# Patient Record
Sex: FEMALE | Race: WHITE | Employment: OTHER | URBAN - METROPOLITAN AREA
[De-identification: names, ages, dates, MRNs, and addresses within clinical notes are randomized per-mention and may not be internally consistent; named-entity substitution may affect disease eponyms.]

---

## 2017-01-11 ENCOUNTER — HOSPITAL ENCOUNTER (OUTPATIENT)
Dept: LAB | Age: 72
Discharge: HOME | End: 2017-01-11
Payer: MEDICARE

## 2017-01-11 ENCOUNTER — OFFICE VISIT (OUTPATIENT)
Dept: FAMILY MEDICINE CLINIC | Age: 72
End: 2017-01-11

## 2017-01-11 VITALS
HEIGHT: 62 IN | DIASTOLIC BLOOD PRESSURE: 75 MMHG | RESPIRATION RATE: 16 BRPM | BODY MASS INDEX: 37.36 KG/M2 | SYSTOLIC BLOOD PRESSURE: 131 MMHG | HEART RATE: 58 BPM | WEIGHT: 203 LBS | OXYGEN SATURATION: 95 % | TEMPERATURE: 97.8 F

## 2017-01-11 DIAGNOSIS — E78.5 HYPERLIPIDEMIA, UNSPECIFIED HYPERLIPIDEMIA TYPE: ICD-10-CM

## 2017-01-11 DIAGNOSIS — Z12.11 SCREEN FOR COLON CANCER: ICD-10-CM

## 2017-01-11 DIAGNOSIS — I10 ESSENTIAL HYPERTENSION: Primary | ICD-10-CM

## 2017-01-11 DIAGNOSIS — Z13.39 SCREENING FOR ALCOHOLISM: ICD-10-CM

## 2017-01-11 DIAGNOSIS — Z13.31 SCREENING FOR DEPRESSION: ICD-10-CM

## 2017-01-11 DIAGNOSIS — Z11.59 NEED FOR HEPATITIS C SCREENING TEST: ICD-10-CM

## 2017-01-11 DIAGNOSIS — R73.03 PRE-DIABETES: ICD-10-CM

## 2017-01-11 DIAGNOSIS — Z00.00 ROUTINE GENERAL MEDICAL EXAMINATION AT A HEALTH CARE FACILITY: ICD-10-CM

## 2017-01-11 PROCEDURE — 80053 COMPREHEN METABOLIC PANEL: CPT

## 2017-01-11 PROCEDURE — 80061 LIPID PANEL: CPT

## 2017-01-11 PROCEDURE — 36415 COLL VENOUS BLD VENIPUNCTURE: CPT

## 2017-01-11 PROCEDURE — 86803 HEPATITIS C AB TEST: CPT

## 2017-01-11 RX ORDER — METOPROLOL SUCCINATE 50 MG/1
TABLET, EXTENDED RELEASE ORAL DAILY
COMMUNITY
End: 2017-02-06 | Stop reason: SDUPTHER

## 2017-01-11 RX ORDER — DICLOFENAC SODIUM 10 MG/G
2 GEL TOPICAL
COMMUNITY
Start: 2016-10-26 | End: 2021-06-23 | Stop reason: ALTCHOICE

## 2017-01-11 RX ORDER — NABUMETONE 750 MG/1
750 TABLET, FILM COATED ORAL 2 TIMES DAILY
COMMUNITY
End: 2017-02-06 | Stop reason: SDUPTHER

## 2017-01-11 RX ORDER — GABAPENTIN 300 MG/1
300 CAPSULE ORAL 3 TIMES DAILY
COMMUNITY
End: 2017-02-06 | Stop reason: SDUPTHER

## 2017-01-11 RX ORDER — HYDROCODONE BITARTRATE AND ACETAMINOPHEN 7.5; 325 MG/1; MG/1
TABLET ORAL
COMMUNITY
End: 2017-03-20 | Stop reason: SDUPTHER

## 2017-01-11 RX ORDER — VENLAFAXINE HYDROCHLORIDE 75 MG/1
CAPSULE, EXTENDED RELEASE ORAL
COMMUNITY
Start: 2016-10-20 | End: 2017-03-20 | Stop reason: ALTCHOICE

## 2017-01-11 NOTE — PROGRESS NOTES
This is a Subsequent Medicare Annual Wellness Visit providing Personalized Prevention Plan Services (PPPS) (Performed 12 months after initial AWV and PPPS )    I have reviewed the patient's medical history in detail and updated the computerized patient record. Immunizations:    Per pt report, up to date  Flu, TDAP, Zoster at [address] @ [facility]    Screening:  Up to date on Pap (2015), Mammogram (2015), Dexa (2015)  Declines colonoscopy; will do FIT testing    Eye Doctor: Dr. Raquel Child in [facility]. Had eye exam in the last year. Diet/Exercise: Patient unable to exercise regularly due to knee and back pain        History     Past Medical History   Diagnosis Date    Anemia     Arthritis     Chronic pain     Depression     Hypercholesterolemia     Hypertension       Past Surgical History   Procedure Laterality Date    Hx hip replacement Bilateral     Hx knee replacement Bilateral     Hx carpal tunnel release Bilateral     Hx appendectomy      Hx hysteroscopy       Current Outpatient Prescriptions   Medication Sig Dispense Refill    VOLTAREN 1 % gel       venlafaxine-SR (EFFEXOR-XR) 75 mg capsule       metoprolol succinate (TOPROL-XL) 50 mg XL tablet Take  by mouth daily.  HYDROcodone-acetaminophen (NORCO) 7.5-325 mg per tablet Take  by mouth.  gabapentin (NEURONTIN) 300 mg capsule Take 300 mg by mouth three (3) times daily.  nabumetone (RELAFEN) 750 mg tablet Take 750 mg by mouth two (2) times a day. Allergies   Allergen Reactions    Sulfa (Sulfonamide Antibiotics) Hives    Zoloft [Sertraline] Hives     History reviewed. No pertinent family history. Social History   Substance Use Topics    Smoking status: Never Smoker    Smokeless tobacco: Never Used    Alcohol use No     There is no problem list on file for this patient.       Depression Risk Factor Screening:     PHQ 2 / 9, over the last two weeks 1/11/2017   Haley interest or pleasure in doing things Several days   Feeling down, depressed or hopeless Several days   Total Score PHQ 2 2     Alcohol Risk Factor Screening: On any occasion during the past 3 months, have you had more than 3 drinks containing alcohol? No    Do you average more than 7 drinks per week? No      Functional Ability and Level of Safety:     Hearing Loss   none    Activities of Daily Living   Self-care. Requires assistance with: no ADLs    Fall Risk     Fall Risk Assessment, last 12 mths 1/11/2017   Able to walk? Yes   Fall in past 12 months? No     Abuse Screen   Patient is not abused    Review of Systems   ROS  Gen: denies fever, chills, fatigue, weight loss, weight gain  HEENT:denies blurry vision, nasal congestion, sore throat  Resp: denies dypsnea, cough, wheezing  CV: denies chest pain, palpitations, lower extremity edema  Abd: denies nausea, vomiting, diarrhea, constipation  Neuro: denies numbness/tingling  Endo: denies polyuria, polydipsia, heat/cold intolerance  Heme: no lymphadenopathy      Physical Examination     Evaluation of Cognitive Function:  Mood/affect:  neutral  Appearance: age appropriate  Family member/caregiver input: not present    Gen: alert, oriented, no acute distress  Head: NCAT  Eyes: PERRLA, sclera clear, conjunctiva clear  Oral: moist mucus membranes,  Neck: supple, midline trachea, no retractions. Thyroid WNL. Resp: Lungs CTAB, no wheezing, rales or rhonchi  CV: Normal rhythm, normal S1/S2, no m/r/g. No LE edema  MSK: normal strength and movement in all extremities, reflexes age appropriate  Skin: no lesion or rash      Patient Care Team:  Sarahi Cuba MD as PCP - General (Family Practice)    Advice/Referrals/Counseling   Education and counseling provided:  Colorectal cancer screening tests  hepatitis C screening    Assessment/Plan   Noel Denise was seen today for annual wellness visit.     Diagnoses and all orders for this visit:    Essential hypertension  - METABOLIC PANEL, COMPREHENSIVE    Routine general medical examination at a health care facility    Screening for alcoholism    Screening for depression  -     Depression Screen Annual    Screen for colon cancer  -     OCCULT BLOOD, IMMUNOASSAY (FIT) (87676)    Need for hepatitis C screening test  -     HEPATITIS C AB    Hyperlipidemia, unspecified hyperlipidemia type  -     LIPID PANEL  -     METABOLIC PANEL, COMPREHENSIVE    Pre-diabetes  -check glucose with CMP; pt is fasting. If elevated, will check A1c. Records reviewed, will scan to chart. Health Maintenance reviewed - will get records. Verbal and written instructions (see AVS) provided.  Patient expresses understanding of diagnosis and treatment plan.     Alvarado Trejo MD

## 2017-01-11 NOTE — MR AVS SNAPSHOT
Visit Information Date & Time Provider Department Dept. Phone Encounter #  
 1/11/2017  1:00 PM Saloni Stein MD [address] [facility] [phone] [id number] Upcoming Health Maintenance Date Due Hepatitis C Screening 1945 DTaP/Tdap/Td series (1 - Tdap) 11/11/1968 BREAST CANCER SCRN MAMMOGRAM 11/11/1997 FOBT Q 1 YEAR AGE 50-75 11/11/1997 ZOSTER VACCINE AGE 60> 11/11/2007 GLAUCOMA SCREENING Q2Y 11/11/2012 OSTEOPOROSIS SCREENING (DEXA) 11/11/2012 MEDICARE YEARLY EXAM 11/11/2012 Pneumococcal 65+ Low/Medium Risk (2 of 2 - PPSV23) 9/14/2016 Allergies as of 1/11/2017  Review Complete On: 1/11/2017 By: Saloni Stein MD  
  
 Severity Noted Reaction Type Reactions Sulfa (Sulfonamide Antibiotics)  01/11/2017    Hives Zoloft [Sertraline]  01/11/2017    Hives Current Immunizations  Never Reviewed No immunizations on file. Not reviewed this visit You Were Diagnosed With   
  
 Codes Comments Essential hypertension    -  Primary ICD-10-CM: I10 
ICD-9-CM: 401.9 Routine general medical examination at a health care facility     ICD-10-CM: Z00.00 ICD-9-CM: V70.0 Screening for alcoholism     ICD-10-CM: Z13.89 ICD-9-CM: V79.1 Screening for depression     ICD-10-CM: Z13.89 ICD-9-CM: V79.0 Screen for colon cancer     ICD-10-CM: Z12.11 ICD-9-CM: V76.51 Need for hepatitis C screening test     ICD-10-CM: Z11.59 
ICD-9-CM: V73.89 Hyperlipidemia, unspecified hyperlipidemia type     ICD-10-CM: E78.5 ICD-9-CM: 272.4 Pre-diabetes     ICD-10-CM: R73.03 
ICD-9-CM: 790.29 Vitals BP Pulse Temp Resp Height(growth percentile) Weight(growth percentile) 131/75 (BP 1 Location: Left arm, BP Patient Position: Sitting) (!) 58 97.8 °F (36.6 °C) (Oral) 16 5' 2\" (1.575 m) 203 lb (92.1 kg) SpO2 BMI OB Status Smoking Status 95% 37.13 kg/m2 Postmenopausal Never Smoker BMI and BSA Data Body Mass Index Body Surface Area  
 37.13 kg/m 2 2.01 m 2 Your Updated Medication List  
  
   
This list is accurate as of: 1/11/17  1:52 PM.  Always use your most recent med list.  
  
  
  
  
 gabapentin 300 mg capsule Commonly known as:  NEURONTIN Take 300 mg by mouth three (3) times daily. HYDROcodone-acetaminophen 7.5-325 mg per tablet Commonly known as:  Lucille Lynn Take  by mouth.  
  
 metoprolol succinate 50 mg XL tablet Commonly known as:  TOPROL-XL Take  by mouth daily. nabumetone 750 mg tablet Commonly known as:  RELAFEN Take 750 mg by mouth two (2) times a day. venlafaxine-SR 75 mg capsule Commonly known as:  EFFEXOR-XR  
  
 VOLTAREN 1 % Gel Generic drug:  diclofenac We Performed the Following [facility] 70 [[id number] HCPCS] HEPATITIS C AB [65956 CPT(R)] LIPID PANEL [05399 CPT(R)] METABOLIC PANEL, COMPREHENSIVE [61610 CPT(R)] OCCULT BLOOD, IMMUNOASSAY (FIT) [id number] CPT(R)] Patient Instructions Wellbutrin Well Visit, Over 72: Care Instructions Your Care Instructions Physical exams can help you stay healthy. Your doctor has checked your overall health and may have suggested ways to take good care of yourself. He or she also may have recommended tests. At home, you can help prevent illness with healthy eating, regular exercise, and other steps. Follow-up care is a key part of your treatment and safety. Be sure to make and go to all appointments, and call your doctor if you are having problems. It's also a good idea to know your test results and keep a list of the medicines you take. How can you care for yourself at home? · Reach and stay at a healthy weight. This will lower your risk for many problems, such as obesity, diabetes, heart disease, and high blood pressure. · Get at least 30 minutes of exercise on most days of the week.  Walking is a good choice. You also may want to do other activities, such as running, swimming, cycling, or playing tennis or team sports. · Do not smoke. Smoking can make health problems worse. If you need help quitting, talk to your doctor about stop-smoking programs and medicines. These can increase your chances of quitting for good. · Protect your skin from too much sun. When you're outdoors from 10 a.m. to 4 p.m., stay in the shade or cover up with clothing and a hat with a wide brim. Wear sunglasses that block UV rays. Even when it's cloudy, put broad-spectrum sunscreen (SPF 30 or higher) on any exposed skin. · See a dentist one or two times a year for checkups and to have your teeth cleaned. · Wear a seat belt in the car. · Limit alcohol to 2 drinks a day for men and 1 drink a day for women. Too much alcohol can cause health problems. Follow your doctor's advice about when to have certain tests. These tests can spot problems early. For men and women · Cholesterol. Your doctor will tell you how often to have this done based on your overall health and other things that can increase your risk for heart attack and stroke. · Blood pressure. Have your blood pressure checked during a routine doctor visit. Your doctor will tell you how often to check your blood pressure based on your age, your blood pressure results, and other factors. · Diabetes. Ask your doctor whether you should have tests for diabetes. · Vision. Experts recommend that you have yearly exams for glaucoma and other age-related eye problems. · Hearing. Tell your doctor if you notice any change in your hearing. You can have tests to find out how well you hear. · Colon cancer tests. Keep having colon cancer tests as your doctor recommends. You can have one of several types of tests. · Heart attack and stroke risk. At least every 4 to 6 years, you should have your risk for heart attack and stroke assessed.  Your doctor uses factors such as your age, blood pressure, cholesterol, and whether you smoke or have diabetes to show what your risk for a heart attack or stroke is over the next 10 years. · Osteoporosis. Talk to your doctor about whether you should have a bone density test to find out whether you have thinning bones. Also ask your doctor about whether you should take calcium and vitamin D supplements. For women · Pap test and pelvic exam. You may no longer need a Pap test. Talk with your doctor about whether to stop or continue to have Pap tests. · Breast exam and mammogram. Ask how often you should have a mammogram, which is an X-ray of your breasts. A mammogram can spot breast cancer before it can be felt and when it is easiest to treat. · Thyroid disease. Talk to your doctor about whether to have your thyroid checked as part of a regular physical exam. Women have an increased chance of a thyroid problem. For men · Prostate exam. Talk to your doctor about whether you should have a blood test (called a PSA test) for prostate cancer. Experts disagree on whether men should have this test. Some experts recommend that you discuss the benefits and risks of the test with your doctor. · Abdominal aortic aneurysm. Ask your doctor whether you should have a test to check for an aneurysm. You may need a test if you ever smoked or if your parent, brother, sister, or child has had an aneurysm. When should you call for help? Watch closely for changes in your health, and be sure to contact your doctor if you have any problems or symptoms that concern you. Where can you learn more? Go to http://breann-johnnie.info/. Enter J771 in the search box to learn more about \"Well Visit, Over 65: Care Instructions. \" Current as of: July 19, 2016 Content Version: 11.1 © 0219-8712 [employer].  Care instructions adapted under license by [employer] (which disclaims liability or warranty for this information). If you have questions about a medical condition or this instruction, always ask your healthcare professional. [address] any warranty or liability for your use of this information. Introducing [facility] SERVICES! Elvi العراقي introduces Trendmeon patient portal. Now you can access parts of your medical record, email your doctor's office, and request medication refills online. 1. In your internet browser, go to [email or web address] 2. Click on the First Time User? Click Here link in the Sign In box. You will see the New Member Sign Up page. 3. Enter your Trendmeon Access Code exactly as it appears below. You will not need to use this code after youve completed the sign-up process. If you do not sign up before the expiration date, you must request a new code. · Trendmeon Access Code: [email or web address] Expires: 4/11/2017  1:15 PM 
 
4. Enter the last four digits of your Social Security Number (xxxx) and Date of Birth (mm/dd/yyyy) as indicated and click Submit. You will be taken to the next sign-up page. 5. Create a Trendmeon ID. This will be your Trendmeon login ID and cannot be changed, so think of one that is secure and easy to remember. 6. Create a Trendmeon password. You can change your password at any time. 7. Enter your Password Reset Question and Answer. This can be used at a later time if you forget your password. 8. Enter your e-mail address. You will receive e-mail notification when new information is available in [address] 9. Click Sign Up. You can now view and download portions of your medical record. 10. Click the Download Summary menu link to download a portable copy of your medical information. If you have questions, please visit the Frequently Asked Questions section of the Trendmeon website. Remember, Trendmeon is NOT to be used for urgent needs. For medical emergencies, dial 911. Now available from your iPhone and Android! Please provide this summary of care documentation to your next provider. Your primary care clinician is listed as Audrey Cordon. If you have any questions after today's visit, please call [phone].

## 2017-01-11 NOTE — PATIENT INSTRUCTIONS
Wellbutrin     Well Visit, Over 72: Care Instructions  Your Care Instructions  Physical exams can help you stay healthy. Your doctor has checked your overall health and may have suggested ways to take good care of yourself. He or she also may have recommended tests. At home, you can help prevent illness with healthy eating, regular exercise, and other steps. Follow-up care is a key part of your treatment and safety. Be sure to make and go to all appointments, and call your doctor if you are having problems. It's also a good idea to know your test results and keep a list of the medicines you take. How can you care for yourself at home? · Reach and stay at a healthy weight. This will lower your risk for many problems, such as obesity, diabetes, heart disease, and high blood pressure. · Get at least 30 minutes of exercise on most days of the week. Walking is a good choice. You also may want to do other activities, such as running, swimming, cycling, or playing tennis or team sports. · Do not smoke. Smoking can make health problems worse. If you need help quitting, talk to your doctor about stop-smoking programs and medicines. These can increase your chances of quitting for good. · Protect your skin from too much sun. When you're outdoors from 10 a.m. to 4 p.m., stay in the shade or cover up with clothing and a hat with a wide brim. Wear sunglasses that block UV rays. Even when it's cloudy, put broad-spectrum sunscreen (SPF 30 or higher) on any exposed skin. · See a dentist one or two times a year for checkups and to have your teeth cleaned. · Wear a seat belt in the car. · Limit alcohol to 2 drinks a day for men and 1 drink a day for women. Too much alcohol can cause health problems. Follow your doctor's advice about when to have certain tests. These tests can spot problems early. For men and women  · Cholesterol.  Your doctor will tell you how often to have this done based on your overall health and other things that can increase your risk for heart attack and stroke. · Blood pressure. Have your blood pressure checked during a routine doctor visit. Your doctor will tell you how often to check your blood pressure based on your age, your blood pressure results, and other factors. · Diabetes. Ask your doctor whether you should have tests for diabetes. · Vision. Experts recommend that you have yearly exams for glaucoma and other age-related eye problems. · Hearing. Tell your doctor if you notice any change in your hearing. You can have tests to find out how well you hear. · Colon cancer tests. Keep having colon cancer tests as your doctor recommends. You can have one of several types of tests. · Heart attack and stroke risk. At least every 4 to 6 years, you should have your risk for heart attack and stroke assessed. Your doctor uses factors such as your age, blood pressure, cholesterol, and whether you smoke or have diabetes to show what your risk for a heart attack or stroke is over the next 10 years. · Osteoporosis. Talk to your doctor about whether you should have a bone density test to find out whether you have thinning bones. Also ask your doctor about whether you should take calcium and vitamin D supplements. For women  · Pap test and pelvic exam. You may no longer need a Pap test. Talk with your doctor about whether to stop or continue to have Pap tests. · Breast exam and mammogram. Ask how often you should have a mammogram, which is an X-ray of your breasts. A mammogram can spot breast cancer before it can be felt and when it is easiest to treat. · Thyroid disease. Talk to your doctor about whether to have your thyroid checked as part of a regular physical exam. Women have an increased chance of a thyroid problem. For men  · Prostate exam. Talk to your doctor about whether you should have a blood test (called a PSA test) for prostate cancer.  Experts disagree on whether men should have this test. Some experts recommend that you discuss the benefits and risks of the test with your doctor. · Abdominal aortic aneurysm. Ask your doctor whether you should have a test to check for an aneurysm. You may need a test if you ever smoked or if your parent, brother, sister, or child has had an aneurysm. When should you call for help? Watch closely for changes in your health, and be sure to contact your doctor if you have any problems or symptoms that concern you. Where can you learn more? Go to http://breann-johnnie.info/. Enter M683 in the search box to learn more about \"Well Visit, Over 65: Care Instructions. \"  Current as of: July 19, 2016  Content Version: 11.1  © 0135-6304 [employer]. Care instructions adapted under license by [employer] (which disclaims liability or warranty for this information). If you have questions about a medical condition or this instruction, always ask your healthcare professional. [address] any warranty or liability for your use of this information.

## 2017-01-11 NOTE — Clinical Note
Can we get immunization records from [facility] INPAT[facility]?   Also, Dr. Issac Nur is eye doctor, can we get latest eye exam.  Thanks

## 2017-01-11 NOTE — PROGRESS NOTES
Chief Complaint   Patient presents with   [facility] Annual Wellness Visit     Patient is here for a medicare wellness visit.

## 2017-01-12 LAB
ALBUMIN SERPL-MCNC: 4.5 G/DL (ref 3.6–4.8)
ALBUMIN/GLOB SERPL: 2.1 {RATIO} (ref 1.1–2.5)
ALP SERPL-CCNC: 89 IU/L (ref 39–117)
ALT SERPL-CCNC: 17 IU/L (ref 0–32)
AST SERPL-CCNC: 18 IU/L (ref 0–40)
BILIRUB SERPL-MCNC: 0.3 MG/DL (ref 0–1.2)
BUN SERPL-MCNC: 19 MG/DL (ref 8–27)
BUN/CREAT SERPL: 28 (ref 11–26)
CALCIUM SERPL-MCNC: 9.1 MG/DL (ref 8.7–10.3)
CHLORIDE SERPL-SCNC: 98 MMOL/L (ref 96–106)
CHOLEST SERPL-MCNC: 245 MG/DL (ref 100–199)
CO2 SERPL-SCNC: 23 MMOL/L (ref 18–29)
CREAT SERPL-MCNC: 0.67 MG/DL (ref 0.57–1)
GLOBULIN SER CALC-MCNC: 2.1 G/DL (ref 1.5–4.5)
GLUCOSE SERPL-MCNC: 102 MG/DL (ref 65–99)
HCV AB S/CO SERPL IA: <0.1 S/CO RATIO (ref 0–0.9)
HDLC SERPL-MCNC: 51 MG/DL
INTERPRETATION, 910389: NORMAL
LDLC SERPL CALC-MCNC: 162 MG/DL (ref 0–99)
POTASSIUM SERPL-SCNC: 4.8 MMOL/L (ref 3.5–5.2)
PROT SERPL-MCNC: 6.6 G/DL (ref 6–8.5)
SODIUM SERPL-SCNC: 141 MMOL/L (ref 134–144)
TRIGL SERPL-MCNC: 159 MG/DL (ref 0–149)
VLDLC SERPL CALC-MCNC: 32 MG/DL (ref 5–40)

## 2017-01-13 NOTE — PROGRESS NOTES
Patient verified her name and . Patient notified of her lab results per Dr. Kimber Milton. Patient verbalized understanding. Appt. Scheduled for 17 with Dr. Kimber Milton.

## 2017-01-13 NOTE — PROGRESS NOTES
Please call pt-cholesterol is high and sugar is borderline. Please schedule office visit to discuss possible medications, check A1c.

## 2017-01-18 ENCOUNTER — HOSPITAL ENCOUNTER (OUTPATIENT)
Dept: LAB | Age: 72
Discharge: HOME | End: 2017-01-18
Payer: MEDICARE

## 2017-01-18 ENCOUNTER — OFFICE VISIT (OUTPATIENT)
Dept: FAMILY MEDICINE CLINIC | Age: 72
End: 2017-01-18

## 2017-01-18 VITALS
DIASTOLIC BLOOD PRESSURE: 82 MMHG | HEART RATE: 69 BPM | BODY MASS INDEX: 37.36 KG/M2 | HEIGHT: 62 IN | WEIGHT: 203 LBS | RESPIRATION RATE: 16 BRPM | OXYGEN SATURATION: 96 % | SYSTOLIC BLOOD PRESSURE: 139 MMHG | TEMPERATURE: 98.4 F

## 2017-01-18 DIAGNOSIS — E78.5 HYPERLIPIDEMIA, UNSPECIFIED HYPERLIPIDEMIA TYPE: ICD-10-CM

## 2017-01-18 DIAGNOSIS — R73.03 PRE-DIABETES: Primary | ICD-10-CM

## 2017-01-18 DIAGNOSIS — R53.83 FATIGUE, UNSPECIFIED TYPE: ICD-10-CM

## 2017-01-18 LAB — HBA1C MFR BLD HPLC: 6.2 %

## 2017-01-18 PROCEDURE — 85025 COMPLETE CBC W/AUTO DIFF WBC: CPT

## 2017-01-18 PROCEDURE — 36415 COLL VENOUS BLD VENIPUNCTURE: CPT

## 2017-01-18 RX ORDER — SIMVASTATIN 20 MG/1
20 TABLET, FILM COATED ORAL
Qty: 90 TAB | Refills: 3 | Status: SHIPPED | OUTPATIENT
Start: 2017-01-18 | End: 2017-02-07 | Stop reason: SDUPTHER

## 2017-01-18 NOTE — PROGRESS NOTES
Chief Complaint   Patient presents with    Abnormal Lab Results     Patient is here to discuss lab results.

## 2017-01-18 NOTE — MR AVS SNAPSHOT
Visit Information Date & Time Provider Department Dept. Phone Encounter #  
 1/18/2017  1:45 PM Cassius Fernandez MD [address] [facility] [phone] [id number] Follow-up Instructions Return in about 6 weeks (around 3/1/2017) for cholesterol check. Upcoming Health Maintenance Date Due FOBT Q 1 YEAR, 18+ 11/11/1965 DTaP/Tdap/Td series (1 - Tdap) 11/11/1968 BREAST CANCER SCRN MAMMOGRAM 11/11/1997 ZOSTER VACCINE AGE 60> 11/11/2007 GLAUCOMA SCREENING Q2Y 11/11/2012 OSTEOPOROSIS SCREENING (DEXA) 11/11/2012 Pneumococcal 65+ Low/Medium Risk (2 of 2 - PPSV23) 9/14/2016 MEDICARE YEARLY EXAM 1/12/2018 COLONOSCOPY 1/18/2027 Allergies as of 1/18/2017  Review Complete On: 1/18/2017 By: Stepan Fuelling Severity Noted Reaction Type Reactions Sulfa (Sulfonamide Antibiotics)  01/11/2017    Hives Zoloft [Sertraline]  01/11/2017    Hives Current Immunizations  Never Reviewed No immunizations on file. Not reviewed this visit You Were Diagnosed With   
  
 Codes Comments Elevated random blood glucose level    -  Primary ICD-10-CM: R73.09 
ICD-9-CM: 790.29 Vitals BP Pulse Temp Resp Height(growth percentile) Weight(growth percentile) 139/82 (BP 1 Location: Left arm, BP Patient Position: Sitting) 69 98.4 °F (36.9 °C) (Oral) 16 5' 2\" (1.575 m) 203 lb (92.1 kg) SpO2 BMI OB Status Smoking Status 96% 37.13 kg/m2 Postmenopausal Never Smoker Vitals History BMI and BSA Data Body Mass Index Body Surface Area  
 37.13 kg/m 2 2.01 m 2 Preferred Pharmacy Pharmacy Name Phone [address] [facility] [phone] Your Updated Medication List  
  
   
This list is accurate as of: 1/18/17  2:29 PM.  Always use your most recent med list.  
  
  
  
  
 gabapentin 300 mg capsule Commonly known as:  NEURONTIN  
 Take 300 mg by mouth three (3) times daily. HYDROcodone-acetaminophen 7.5-325 mg per tablet Commonly known as:  Davidson Minor Take  by mouth.  
  
 metoprolol succinate 50 mg XL tablet Commonly known as:  TOPROL-XL Take  by mouth daily. nabumetone 750 mg tablet Commonly known as:  RELAFEN Take 750 mg by mouth two (2) times a day. venlafaxine-SR 75 mg capsule Commonly known as:  EFFEXOR-XR  
  
 VOLTAREN 1 % Gel Generic drug:  diclofenac We Performed the Following AMB POC HEMOGLOBIN A1C [19015 CPT(R)] Follow-up Instructions Return in about 6 weeks (around 3/1/2017) for cholesterol check. Patient Instructions Simvastatin (By mouth) Simvastatin ([facility]-va-STAT-in) Treats high cholesterol and triglyceride levels. May reduce the risk of heart attack, stroke, and related health conditions. This medicine is a statin. Brand Name(s):Zocor There may be other brand names for this medicine. When This Medicine Should Not Be Used: This medicine is not right for everyone. Do not use it if you had an allergic reaction to simvastatin, you are pregnant or breastfeeding, or you have active liver disease. How to Use This Medicine:  
Tablet · Take your medicine as directed. Your dose may need to be changed several times to find what works best for you. · Take the medicine in the evening, unless your doctor tells you otherwise. · Missed dose: Take a dose as soon as you remember. If it is almost time for your next dose, wait until then and take a regular dose. Do not take extra medicine to make up for a missed dose. · Store the medicine in a closed container at room temperature, away from heat, moisture, and direct light. Drugs and Foods to Avoid: Ask your doctor or pharmacist before using any other medicine, including over-the-counter medicines, vitamins, and herbal products.  
· Do not use this medicine together with boceprevir, clarithromycin, cobicistat, cyclosporine, danazol, erythromycin, gemfibrozil, itraconazole, ketoconazole, nefazodone, posaconazole, telaprevir, telithromycin, voriconazole, or medicines to treat HIV/AIDS. · Some foods and medicines can affect how simvastatin works. Tell your doctor if you are using amiodarone, colchicine, dronedarone, lomitapide, niacin (vitamin B3), ranolazine, a blood thinner (such as warfarin), or blood pressure medicine (such as amlodipine, diltiazem, verapamil). · Do not eat grapefruit or drink grapefruit juice while you are using this medicine. Warnings While Using This Medicine: · It is not safe to take this medicine during pregnancy. It could harm an unborn baby. Tell your doctor right away if you become pregnant. · Tell your doctor if you have kidney disease, diabetes, thyroid disease, or a history of stroke or heart disease. Tell your doctor if you drink alcohol regularly or have a history of liver disease. · This medicine may cause liver problems, or myopathy or rhabdomyolysis (muscle damage). · Tell any doctor or dentist who treats you that you are using this medicine. You may need to stop using this medicine several days before you have surgery or medical tests. · Your doctor will do lab tests at regular visits to check on the effects of this medicine. Keep all appointments. · Keep all medicine out of the reach of children. Never share your medicine with anyone. Possible Side Effects While Using This Medicine:  
Call your doctor right away if you notice any of these side effects: · Allergic reaction: Itching or hives, swelling in your face or hands, swelling or tingling in your mouth or throat, chest tightness, trouble breathing · Blistering, peeling, red skin rash · Fast or uneven heartbeat · Dark urine or pale stools, nausea, vomiting, loss of appetite, stomach pain, yellow skin or eyes · Muscle pain, tenderness, or weakness · Unusual tiredness If you notice other side effects that you think are caused by this medicine, tell your doctor. Call your doctor for medical advice about side effects. You may report side effects to FDA at 9-714-GFB-7078 © 2016 [address] is for End User's use only and may not be sold, redistributed or otherwise used for commercial purposes. The above information is an  only. It is not intended as medical advice for individual conditions or treatments. Talk to your doctor, nurse or pharmacist before following any medical regimen to see if it is safe and effective for you. Introducing [facility]! [facility] introduces Smile patient portal. Now you can access parts of your medical record, email your doctor's office, and request medication refills online. 1. In your internet browser, go to [email or web address] 2. Click on the First Time User? Click Here link in the Sign In box. You will see the New Member Sign Up page. 3. Enter your Smile Access Code exactly as it appears below. You will not need to use this code after youve completed the sign-up process. If you do not sign up before the expiration date, you must request a new code. · Smile Access Code: [email or web address] Expires: 4/11/2017  1:15 PM 
 
4. Enter the last four digits of your Social Security Number (xxxx) and Date of Birth (mm/dd/yyyy) as indicated and click Submit. You will be taken to the next sign-up page. 5. Create a Traditional Medicinalst ID. This will be your Smile login ID and cannot be changed, so think of one that is secure and easy to remember. 6. Create a Smile password. You can change your password at any time. 7. Enter your Password Reset Question and Answer. This can be used at a later time if you forget your password. 8. Enter your e-mail address. You will receive e-mail notification when new information is available in [address]. 9. Click Sign Up. You can now view and download portions of your medical record. 10. Click the Download Summary menu link to download a portable copy of your medical information. If you have questions, please visit the Frequently Asked Questions section of the [employer] website. Remember, [employer] is NOT to be used for urgent needs. For medical emergencies, dial 911. Now available from your iPhone and Android! Please provide this summary of care documentation to your next provider. Your primary care clinician is listed as Zuleyka Anderson. If you have any questions after today's visit, please call [phone].

## 2017-01-18 NOTE — PATIENT INSTRUCTIONS
Simvastatin (By mouth)   Simvastatin (sim-va-STAT-in)  Treats high cholesterol and triglyceride levels. May reduce the risk of heart attack, stroke, and related health conditions. This medicine is a statin. Brand Name(s):Zocor   There may be other brand names for this medicine. When This Medicine Should Not Be Used: This medicine is not right for everyone. Do not use it if you had an allergic reaction to simvastatin, you are pregnant or breastfeeding, or you have active liver disease. How to Use This Medicine:   Tablet  · Take your medicine as directed. Your dose may need to be changed several times to find what works best for you. · Take the medicine in the evening, unless your doctor tells you otherwise. · Missed dose: Take a dose as soon as you remember. If it is almost time for your next dose, wait until then and take a regular dose. Do not take extra medicine to make up for a missed dose. · Store the medicine in a closed container at room temperature, away from heat, moisture, and direct light. Drugs and Foods to Avoid:   Ask your doctor or pharmacist before using any other medicine, including over-the-counter medicines, vitamins, and herbal products. · Do not use this medicine together with boceprevir, clarithromycin, cobicistat, cyclosporine, danazol, erythromycin, gemfibrozil, itraconazole, ketoconazole, nefazodone, posaconazole, telaprevir, telithromycin, voriconazole, or medicines to treat HIV/AIDS. · Some foods and medicines can affect how simvastatin works. Tell your doctor if you are using amiodarone, colchicine, dronedarone, lomitapide, niacin (vitamin B3), ranolazine, a blood thinner (such as warfarin), or blood pressure medicine (such as amlodipine, diltiazem, verapamil). · Do not eat grapefruit or drink grapefruit juice while you are using this medicine. Warnings While Using This Medicine:   · It is not safe to take this medicine during pregnancy. It could harm an unborn baby.  Tell your doctor right away if you become pregnant. · Tell your doctor if you have kidney disease, diabetes, thyroid disease, or a history of stroke or heart disease. Tell your doctor if you drink alcohol regularly or have a history of liver disease. · This medicine may cause liver problems, or myopathy or rhabdomyolysis (muscle damage). · Tell any doctor or dentist who treats you that you are using this medicine. You may need to stop using this medicine several days before you have surgery or medical tests. · Your doctor will do lab tests at regular visits to check on the effects of this medicine. Keep all appointments. · Keep all medicine out of the reach of children. Never share your medicine with anyone. Possible Side Effects While Using This Medicine:   Call your doctor right away if you notice any of these side effects:  · Allergic reaction: Itching or hives, swelling in your face or hands, swelling or tingling in your mouth or throat, chest tightness, trouble breathing  · Blistering, peeling, red skin rash  · Fast or uneven heartbeat  · Dark urine or pale stools, nausea, vomiting, loss of appetite, stomach pain, yellow skin or eyes  · Muscle pain, tenderness, or weakness  · Unusual tiredness  If you notice other side effects that you think are caused by this medicine, tell your doctor. Call your doctor for medical advice about side effects. You may report side effects to FDA at 0-787-FDA-2383  © 2016 [address] is for End User's use only and may not be sold, redistributed or otherwise used for commercial purposes. The above information is an  only. It is not intended as medical advice for individual conditions or treatments. Talk to your doctor, nurse or pharmacist before following any medical regimen to see if it is safe and effective for you.

## 2017-01-18 NOTE — PROGRESS NOTES
Subjective:     Pati Moreno is a 73 y.o. female who presents today with the following:  Chief Complaint   Patient presents with    Abnormal Lab Results     Pre-diabetes  Patient here to follow up on lab results. Blood sugar was mildly elevated on labs. Patient does have a history of pre-diabetes, but has not been on medication. Patient does note some polydipsia and fatigue. She is also concerned she may be anemic d/t fatigue. Hyperlipidemia  Patient with elevated cholesterol on last set of labs: total chol was 245, Trig 159, and LDL was 162. Patient does not take medication for cholesterol currently. Does not exercise regularly d/t arthritis. Pt states cholesterol was elevated a year ago on last set of labs, but she didn't start medicine because she thought she could bring it down with exercise/diet. Is ready to start a cholesterol pill. ROS:  Gen: denies fever, chills,  Endorses fatigue, denies weight loss, weight gain  HEENT:denies blurry vision, nasal congestion, sore throat  Resp: denies dypsnea, cough, wheezing  CV: denies chest pain, palpitations, lower extremity edema  Abd: denies nausea, vomiting, diarrhea, constipation  Neuro: denies numbness/tingling  Endo: denies polyuria, polydipsia, heat/cold intolerance    Allergies   Allergen Reactions    Sulfa (Sulfonamide Antibiotics) Hives    Zoloft [Sertraline] Hives         Current Outpatient Prescriptions:     simvastatin (ZOCOR) 20 mg tablet, Take 1 Tab by mouth nightly., Disp: 90 Tab, Rfl: 3    VOLTAREN 1 % gel, , Disp: , Rfl:     venlafaxine-SR (EFFEXOR-XR) 75 mg capsule, , Disp: , Rfl:     metoprolol succinate (TOPROL-XL) 50 mg XL tablet, Take  by mouth daily. , Disp: , Rfl:     HYDROcodone-acetaminophen (NORCO) 7.5-325 mg per tablet, Take  by mouth., Disp: , Rfl:     gabapentin (NEURONTIN) 300 mg capsule, Take 300 mg by mouth three (3) times daily. , Disp: , Rfl:     nabumetone (RELAFEN) 750 mg tablet, Take 750 mg by mouth two (2) times a day., Disp: , Rfl:     Past Medical History   Diagnosis Date    Anemia     Arthritis     Chronic pain     Depression     Hypercholesterolemia     Hypertension        Past Surgical History   Procedure Laterality Date    Hx hip replacement Bilateral     Hx knee replacement Bilateral     Hx carpal tunnel release Bilateral     Hx appendectomy      Hx hysteroscopy         History   Smoking Status    Never Smoker   Smokeless Tobacco    Never Used       Social History     Social History    Marital status:      Spouse name: N/A    Number of children: N/A    Years of education: N/A     Social History Main Topics    Smoking status: Never Smoker    Smokeless tobacco: Never Used    Alcohol use No    Drug use: No    Sexual activity: Not Asked     Other Topics Concern    None     Social History Narrative       History reviewed. No pertinent family history. Objective:     Visit Vitals    /82 (BP 1 Location: Left arm, BP Patient Position: Sitting)    Pulse 69    Temp 98.4 °F (36.9 °C) (Oral)    Resp 16    Ht 5' 2\" (1.575 m)    Wt 203 lb (92.1 kg)    SpO2 96%    BMI 37.13 kg/m2     Gen: alert, oriented, no acute distress  Head: normocephalic, atraumatic  Eyes:sclera clear, conjunctiva clear  Oral: moist mucus membranes  Neck: symmetric normal sized thyroid, no carotid bruits, no JVD  Resp: Normal work of breathing, lungs CTAB, no w/r/r  CV: S1, S2 normal.  No murmurs, rubs, or gallops. Abd:  Normal bowel sounds. Soft, not tender, not distended. Skin: no rash             Extremities: no edema    Results for orders placed or performed in visit on 01/18/17   AMB POC HEMOGLOBIN A1C   Result Value Ref Range    Hemoglobin A1c (POC) 6.2 %       Assessment/ Plan:   Lindsey Rosenberg was seen today for abnormal lab results.     Diagnoses and all orders for this visit:    Pre-diabetes  -     AMB POC HEMOGLOBIN A1C  -advised patient that she should work on diet/exercise to help with pre-diabetes and cholesterol. Will recheck A1c in 6 months.  -literature on diabetic diet given to patient    Hyperlipidemia, unspecified hyperlipidemia type  -     simvastatin (ZOCOR) 20 mg tablet; Take 1 Tab by mouth nightly.  -discussed side effects of medication in details    Fatigue, unspecified type  -     CBC WITH AUTOMATED DIFF     Follow up 6 weeks for cholesterol recheck. Verbal and written instructions (see AVS) provided.  Patient expresses understanding of diagnosis and treatment plan. Nadine Parsons.  Gale Ruiz MD

## 2017-01-19 LAB
BASOPHILS # BLD AUTO: 0 X10E3/UL (ref 0–0.2)
BASOPHILS NFR BLD AUTO: 0 %
EOSINOPHIL # BLD AUTO: 0.1 X10E3/UL (ref 0–0.4)
EOSINOPHIL NFR BLD AUTO: 2 %
ERYTHROCYTE [DISTWIDTH] IN BLOOD BY AUTOMATED COUNT: 13.8 % (ref 12.3–15.4)
HCT VFR BLD AUTO: 39.6 % (ref 34–46.6)
HGB BLD-MCNC: 13.3 G/DL (ref 11.1–15.9)
IMM GRANULOCYTES # BLD: 0 X10E3/UL (ref 0–0.1)
IMM GRANULOCYTES NFR BLD: 0 %
LYMPHOCYTES # BLD AUTO: 2.8 X10E3/UL (ref 0.7–3.1)
LYMPHOCYTES NFR BLD AUTO: 40 %
MCH RBC QN AUTO: 29 PG (ref 26.6–33)
MCHC RBC AUTO-ENTMCNC: 33.6 G/DL (ref 31.5–35.7)
MCV RBC AUTO: 87 FL (ref 79–97)
MONOCYTES # BLD AUTO: 0.9 X10E3/UL (ref 0.1–0.9)
MONOCYTES NFR BLD AUTO: 12 %
NEUTROPHILS # BLD AUTO: 3.3 X10E3/UL (ref 1.4–7)
NEUTROPHILS NFR BLD AUTO: 46 %
PLATELET # BLD AUTO: 272 X10E3/UL (ref 150–379)
RBC # BLD AUTO: 4.58 X10E6/UL (ref 3.77–5.28)
WBC # BLD AUTO: 7.1 X10E3/UL (ref 3.4–10.8)

## 2017-01-19 NOTE — PROGRESS NOTES
Patient verified her name and . Patient notified of lab results per Dr. Mikki Campoverde. Patient verbalized understanding.

## 2017-02-06 RX ORDER — HYDROCODONE BITARTRATE AND ACETAMINOPHEN 7.5; 325 MG/1; MG/1
TABLET ORAL
OUTPATIENT
Start: 2017-02-06

## 2017-02-06 RX ORDER — NABUMETONE 750 MG/1
750 TABLET, FILM COATED ORAL 2 TIMES DAILY
Qty: 180 TAB | Refills: 4 | Status: SHIPPED | OUTPATIENT
Start: 2017-02-06 | End: 2018-05-08 | Stop reason: SDUPTHER

## 2017-02-06 RX ORDER — METOPROLOL SUCCINATE 50 MG/1
50 TABLET, EXTENDED RELEASE ORAL DAILY
Qty: 90 TAB | Refills: 4 | Status: SHIPPED | OUTPATIENT
Start: 2017-02-06 | End: 2018-05-03 | Stop reason: SDUPTHER

## 2017-02-06 RX ORDER — GABAPENTIN 300 MG/1
300 CAPSULE ORAL 3 TIMES DAILY
Qty: 270 CAP | Refills: 4 | Status: SHIPPED | OUTPATIENT
Start: 2017-02-06 | End: 2017-02-16 | Stop reason: SDUPTHER

## 2017-02-06 NOTE — TELEPHONE ENCOUNTER
At our last appt, we had discussed that her arthritis physician would be prescribing her controlled substances, not our office. If she wants us to take over, I will need most recent imaging she has for back and knee to review and determine if we can prescribe narcotics. Last imaging we have on file for her is 2009.

## 2017-02-06 NOTE — TELEPHONE ENCOUNTER
All are historical Med's not ordered here.  for Hydrocodone didn't show any usage she would like the [facility] but that one will need to be printed and signed. She would [facility].

## 2017-02-07 DIAGNOSIS — E78.5 HYPERLIPIDEMIA, UNSPECIFIED HYPERLIPIDEMIA TYPE: ICD-10-CM

## 2017-02-08 RX ORDER — SIMVASTATIN 20 MG/1
20 TABLET, FILM COATED ORAL
Qty: 90 TAB | Refills: 3 | Status: SHIPPED | OUTPATIENT
Start: 2017-02-08 | End: 2018-03-14 | Stop reason: SDUPTHER

## 2017-02-16 RX ORDER — GABAPENTIN 300 MG/1
CAPSULE ORAL
Qty: 450 CAP | Refills: 1 | Status: SHIPPED | COMMUNITY
Start: 2017-02-16 | End: 2018-02-20 | Stop reason: SDUPTHER

## 2017-02-16 NOTE — TELEPHONE ENCOUNTER
Please resend gabapentin. Rx was supposes to be for take one five times daily. Patient is also wanting a referral for gastric sleeve consultation.

## 2017-03-20 ENCOUNTER — OFFICE VISIT (OUTPATIENT)
Dept: FAMILY MEDICINE CLINIC | Age: 72
End: 2017-03-20

## 2017-03-20 VITALS
HEIGHT: 60 IN | WEIGHT: 206 LBS | TEMPERATURE: 98.1 F | HEART RATE: 60 BPM | RESPIRATION RATE: 17 BRPM | OXYGEN SATURATION: 95 % | SYSTOLIC BLOOD PRESSURE: 115 MMHG | BODY MASS INDEX: 40.44 KG/M2 | DIASTOLIC BLOOD PRESSURE: 73 MMHG

## 2017-03-20 DIAGNOSIS — M51.36 DEGENERATIVE DISC DISEASE, LUMBAR: Primary | ICD-10-CM

## 2017-03-20 DIAGNOSIS — R73.03 PRE-DIABETES: ICD-10-CM

## 2017-03-20 RX ORDER — DULOXETIN HYDROCHLORIDE 60 MG/1
60 CAPSULE, DELAYED RELEASE ORAL DAILY
COMMUNITY
Start: 2017-02-01 | End: 2018-06-25 | Stop reason: SDUPTHER

## 2017-03-20 RX ORDER — HYDROCODONE BITARTRATE AND ACETAMINOPHEN 7.5; 325 MG/1; MG/1
1 TABLET ORAL
Qty: 80 TAB | Refills: 0 | Status: SHIPPED | OUTPATIENT
Start: 2017-03-20 | End: 2018-06-01

## 2017-03-20 NOTE — MR AVS SNAPSHOT
Visit Information Date & Time Provider Department Dept. Phone Encounter #  
 3/20/2017 10:15 AM Karl Sanchez MD [facility] [address] [address] [phone] [id number] Upcoming Health Maintenance Date Due FOBT Q 1 YEAR, 18+ 11/11/1963 BREAST CANCER SCRN MAMMOGRAM 8/31/2017 MEDICARE YEARLY EXAM 1/12/2018 GLAUCOMA SCREENING Q2Y 3/20/2018 DTaP/Tdap/Td series (2 - Td) 3/20/2026 COLONOSCOPY 1/18/2027 Allergies as of 3/20/2017  Review Complete On: 3/20/2017 By: Karl Sanchez MD  
  
 Severity Noted Reaction Type Reactions Sulfa (Sulfonamide Antibiotics)  01/11/2017    Hives Zoloft [Sertraline]  01/11/2017    Hives Current Immunizations  Never Reviewed Name Date Pneumococcal Conjugate (PCV-13) 3/20/2015 Pneumococcal Polysaccharide (PPSV-23) 3/20/2013 Tdap 3/20/2016 Zoster Vaccine, Live 3/20/2016 Not reviewed this visit You Were Diagnosed With   
  
 Codes Comments Degenerative disc disease, lumbar    -  Primary ICD-10-CM: M51.36 
ICD-9-CM: 722.52 Pre-diabetes     ICD-10-CM: R73.03 
ICD-9-CM: 790.29 Vitals BP Pulse Temp Resp Height(growth percentile) Weight(growth percentile) 115/73 (BP 1 Location: Left arm, BP Patient Position: Sitting) 60 98.1 °F (36.7 °C) (Oral) 17 5' 0.38\" (1.534 m) 206 lb (93.4 kg) SpO2 BMI OB Status Smoking Status 95% 39.73 kg/m2 Postmenopausal Never Smoker Vitals History BMI and BSA Data Body Mass Index Body Surface Area  
 39.73 kg/m 2 1.99 m 2 Preferred Pharmacy Pharmacy Name Phone [address] [facility] [phone] Your Updated Medication List  
  
   
This list is accurate as of: 3/20/17 11:33 AM.  Always use your most recent med list.  
  
  
  
  
 DULoxetine 60 mg capsule Commonly known as:  CYMBALTA Take 60 mg by mouth daily. gabapentin 300 mg capsule Commonly known as:  NEURONTIN Take 1 capsule five times daily. HYDROcodone-acetaminophen 7.5-325 mg per tablet Commonly known as:  Miquel Ill Take 1 Tab by mouth every six (6) hours as needed for Pain. Max Daily Amount: 4 Tabs. metoprolol succinate 50 mg XL tablet Commonly known as:  TOPROL-XL Take 1 Tab by mouth daily. nabumetone 750 mg tablet Commonly known as:  RELAFEN Take 1 Tab by mouth two (2) times a day. simvastatin 20 mg tablet Commonly known as:  ZOCOR Take 1 Tab by mouth nightly. VOLTAREN 1 % Gel Generic drug:  diclofenac Prescriptions Printed Refills HYDROcodone-acetaminophen (NORCO) 7.5-325 mg per tablet 0 Sig: Take 1 Tab by mouth every six (6) hours as needed for Pain. Max Daily Amount: 4 Tabs. Class: Print Route: Oral  
  
Patient Instructions Prediabetes: Care Instructions Your Care Instructions Prediabetes is a warning sign that you are at risk for getting type 2 diabetes. It means that your blood sugar is higher than it should be. The food you eat turns into sugar, which your body uses for energy. Normally, an organ called the pancreas makes insulin, which allows the sugar in your blood to get into your body's cells. But when your body can't use insulin the right way, the sugar doesn't move into cells. It stays in your blood instead. This is called insulin resistance. The buildup of sugar in the blood causes prediabetes. The good news is that lifestyle changes may help you get your blood sugar back to normal and help you avoid or delay diabetes. Follow-up care is a key part of your treatment and safety. Be sure to make and go to all appointments, and call your doctor if you are having problems. It's also a good idea to know your test results and keep a list of the medicines you take. How can you care for yourself at home? · Watch your weight. A healthy weight helps your body use insulin properly. · Limit the amount of calories, sweets, and unhealthy fat you eat. Ask your doctor if you should see a dietitian. A registered dietitian can help you create meal plans that fit your lifestyle. · Get at least 30 minutes of exercise on most days of the week. Exercise helps control your blood sugar. It also helps you maintain a healthy weight. Walking is a good choice. You also may want to do other activities, such as running, swimming, cycling, or playing tennis or team sports. · Do not smoke. Smoking can make prediabetes worse. If you need help quitting, talk to your doctor about stop-smoking programs and medicines. These can increase your chances of quitting for good. · If your doctor prescribed medicines, take them exactly as prescribed. Call your doctor if you think you are having a problem with your medicine. You will get more details on the specific medicines your doctor prescribes. When should you call for help? Watch closely for changes in your health, and be sure to contact your doctor if: 
· You have any symptoms of diabetes. These may include: ¨ Being thirsty more often. ¨ Urinating more. ¨ Being hungrier. ¨ Losing weight. ¨ Being very tired. ¨ Having blurry vision. · You have a wound that will not heal. 
· You have an infection that will not go away. · You have problems with your blood pressure. · You want more information about diabetes and how you can keep from getting it. Where can you learn more? Go to http://breann-johnnie.info/. Enter I222 in the search box to learn more about \"Prediabetes: Care Instructions. \" Current as of: May 23, 2016 Content Version: 11.1 © 9722-0586 Healthwise, Incorporated. Care instructions adapted under license by [employer] (which disclaims liability or warranty for this information).  If you have questions about a medical condition or this instruction, always ask your healthcare professional. [employer] disclaims any warranty or liability for your use of this information. Introducing [facility]! [address] introduces [employer] patient portal. Now you can access parts of your medical record, email your doctor's office, and request medication refills online. 1. In your internet browser, go to [email or web address] 2. Click on the First Time User? Click Here link in the Sign In box. You will see the New Member Sign Up page. 3. Enter your [employer] Access Code exactly as it appears below. You will not need to use this code after youve completed the sign-up process. If you do not sign up before the expiration date, you must request a new code. · [employer] Access Code: [email or web address] Expires: 4/11/2017  2:15 PM 
 
4. Enter the last four digits of your Social Security Number (xxxx) and Date of Birth (mm/dd/yyyy) as indicated and click Submit. You will be taken to the next sign-up page. 5. Create a [employer] ID. This will be your [employer] login ID and cannot be changed, so think of one that is secure and easy to remember. 6. Create a [employer] password. You can change your password at any time. 7. Enter your Password Reset Question and Answer. This can be used at a later time if you forget your password. 8. Enter your e-mail address. You will receive e-mail notification when new information is available in [address] 9. Click Sign Up. You can now view and download portions of your medical record. 10. Click the Download Summary menu link to download a portable copy of your medical information. If you have questions, please visit the Frequently Asked Questions section of the [employer] website. Remember, [employer] is NOT to be used for urgent needs. For medical emergencies, dial 911. Now available from your iPhone and Android! Please provide this summary of care documentation to your next provider. Your primary care clinician is listed as Petey Baker. If you have any questions after today's visit, please call [phone].

## 2017-03-20 NOTE — PATIENT INSTRUCTIONS
Prediabetes: Care Instructions  Your Care Instructions  Prediabetes is a warning sign that you are at risk for getting type 2 diabetes. It means that your blood sugar is higher than it should be. The food you eat turns into sugar, which your body uses for energy. Normally, an organ called the pancreas makes insulin, which allows the sugar in your blood to get into your body's cells. But when your body can't use insulin the right way, the sugar doesn't move into cells. It stays in your blood instead. This is called insulin resistance. The buildup of sugar in the blood causes prediabetes. The good news is that lifestyle changes may help you get your blood sugar back to normal and help you avoid or delay diabetes. Follow-up care is a key part of your treatment and safety. Be sure to make and go to all appointments, and call your doctor if you are having problems. It's also a good idea to know your test results and keep a list of the medicines you take. How can you care for yourself at home? · Watch your weight. A healthy weight helps your body use insulin properly. · Limit the amount of calories, sweets, and unhealthy fat you eat. Ask your doctor if you should see a dietitian. A registered dietitian can help you create meal plans that fit your lifestyle. · Get at least 30 minutes of exercise on most days of the week. Exercise helps control your blood sugar. It also helps you maintain a healthy weight. Walking is a good choice. You also may want to do other activities, such as running, swimming, cycling, or playing tennis or team sports. · Do not smoke. Smoking can make prediabetes worse. If you need help quitting, talk to your doctor about stop-smoking programs and medicines. These can increase your chances of quitting for good. · If your doctor prescribed medicines, take them exactly as prescribed. Call your doctor if you think you are having a problem with your medicine.  You will get more details on the specific medicines your doctor prescribes. When should you call for help? Watch closely for changes in your health, and be sure to contact your doctor if:  · You have any symptoms of diabetes. These may include:  ¨ Being thirsty more often. ¨ Urinating more. ¨ Being hungrier. ¨ Losing weight. ¨ Being very tired. ¨ Having blurry vision. · You have a wound that will not heal.  · You have an infection that will not go away. · You have problems with your blood pressure. · You want more information about diabetes and how you can keep from getting it. Where can you learn more? Go to http://breann-johnnie.info/. Enter I222 in the search box to learn more about \"Prediabetes: Care Instructions. \"  Current as of: May 23, 2016  Content Version: 11.1  © 4612-9367 [employer]. Care instructions adapted under license by [employer] (which disclaims liability or warranty for this information). If you have questions about a medical condition or this instruction, always ask your healthcare professional. [address] any warranty or liability for your use of this information.

## 2017-03-20 NOTE — PROGRESS NOTES
73yo struggling with weight - has tried several different weight loss plans - weight watchers, nutrisystem without relief. States her arthritis is limiting her exercise - s/p both TKR and THR. Has arthritis in both shoulders, but hasn't had them replaced. Also has arthritis in back. Taking nabumetone, gabapentin, cymbalta. Brings hydrocodone bottle #80 from Dr. Ingrid Alvarenga 2014, states she'd like a refill. Glucose intolerance - not watching carb intake at this time. Review of external records reveals A1C was 6.1 last year, but patient states she was unaware. Past Medical History, Surgical History, and Social History reviewed. Current Outpatient Prescriptions on File Prior to Visit   Medication Sig Dispense Refill    gabapentin (NEURONTIN) 300 mg capsule Take 1 capsule five times daily. 450 Cap 1    simvastatin (ZOCOR) 20 mg tablet Take 1 Tab by mouth nightly. 90 Tab 3    metoprolol succinate (TOPROL-XL) 50 mg XL tablet Take 1 Tab by mouth daily. 90 Tab 4    nabumetone (RELAFEN) 750 mg tablet Take 1 Tab by mouth two (2) times a day. 180 Tab 4    VOLTAREN 1 % gel        No current facility-administered medications on file prior to visit. Allergies   Allergen Reactions    Sulfa (Sulfonamide Antibiotics) Hives    Zoloft [Sertraline] Hives     ROS negative except as per HPI. Visit Vitals    /73 (BP 1 Location: Left arm, BP Patient Position: Sitting)    Pulse 60    Temp 98.1 °F (36.7 °C) (Oral)    Resp 17    Ht 5' 0.38\" (1.534 m)    Wt 206 lb (93.4 kg)    SpO2 95%    BMI 39.73 kg/m2     Gen: alert, oriented, no acute distress  Lungs: no increased respiratory effort, clear to ausculation bilaterally  Heart: regular rate and rhythm, no murmurs, rubs or gallops   Extremities:no edema or cyanosis    Assessment/Plan:    1. Degenerative disc disease, lumbar  Refilled hydrocodone for infrequent PRN use - brings bottle from 2014.  confirms this fill history.     2. Pre-diabetes  Discussed dietary modifications and ways to try exercise that are low impact. Discussed pros and cons of bariatric procedures. Medications Discontinued During This Encounter   Medication Reason    venlafaxine-SR [facility] 75 mg capsule Alternate Therapy    HYDROcodone-acetaminophen (NORCO) 7.5-325 mg per tablet Reorder       Current Outpatient Prescriptions   Medication Sig Dispense Refill    DULoxetine (CYMBALTA) 60 mg capsule Take 60 mg by mouth daily.  HYDROcodone-acetaminophen (NORCO) 7.5-325 mg per tablet Take 1 Tab by mouth every six (6) hours as needed for Pain. Max Daily Amount: 4 Tabs. 80 Tab 0    gabapentin (NEURONTIN) 300 mg capsule Take 1 capsule five times daily. 450 Cap 1    simvastatin (ZOCOR) 20 mg tablet Take 1 Tab by mouth nightly. 90 Tab 3    metoprolol succinate (TOPROL-XL) 50 mg XL tablet Take 1 Tab by mouth daily. 90 Tab 4    nabumetone (RELAFEN) 750 mg tablet Take 1 Tab by mouth two (2) times a day. 180 Tab 4    VOLTAREN 1 % gel            Recommended healthy diet low in carbohydrates, fats, sodium and cholesterol. Recommended regular cardiovascular exercise 3-6 times per week for 30-60 minutes daily. Verbal and written instructions (see AVS) provided. Patient expresses understanding of diagnosis and treatment plan.

## 2017-03-20 NOTE — PROGRESS NOTES
Chief Complaint   Patient presents with    Weight Management    Arthritis     Health Maintenance reviewed

## 2017-05-10 ENCOUNTER — OFFICE VISIT (OUTPATIENT)
Dept: FAMILY MEDICINE CLINIC | Age: 72
End: 2017-05-10

## 2017-05-10 VITALS
WEIGHT: 205.2 LBS | OXYGEN SATURATION: 93 % | HEIGHT: 60 IN | RESPIRATION RATE: 20 BRPM | SYSTOLIC BLOOD PRESSURE: 117 MMHG | DIASTOLIC BLOOD PRESSURE: 74 MMHG | TEMPERATURE: 98.3 F | HEART RATE: 63 BPM | BODY MASS INDEX: 40.29 KG/M2

## 2017-05-10 DIAGNOSIS — R73.01 ELEVATED FASTING GLUCOSE: Primary | ICD-10-CM

## 2017-05-10 DIAGNOSIS — B37.2 CANDIDAL INTERTRIGO: ICD-10-CM
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
## 2017-05-10 DIAGNOSIS — R06.00 DYSPNEA, UNSPECIFIED TYPE: ICD-10-CM

## 2017-05-10 LAB — HBA1C MFR BLD HPLC: 6 %

## 2017-05-10 RX ORDER — NYSTATIN 100000 [USP'U]/G
POWDER TOPICAL
Qty: 15 G | Refills: 1 | Status: SHIPPED | OUTPATIENT
Start: 2017-05-10 | End: 2018-06-01

## 2017-05-10 RX ORDER — DOXYCYCLINE 100 MG/1
TABLET ORAL
Qty: 2 TAB | Refills: 0 | Status: SHIPPED | OUTPATIENT
Start: 2017-05-10 | End: 2018-02-28 | Stop reason: ALTCHOICE

## 2017-05-10 NOTE — PROGRESS NOTES
Subjective:     Rene Griffin is a 73 y.o. female who presents today with the following:  Chief Complaint   Patient presents with    Rash     below pt left shoulder there is an inflammed bump, pt states she believes she had a tick on her last night, has bug in zip lock bag    Other     pt would like to have a1c checked    Rash     pt states she believes she has a yeast infection under her abdominal fold     Tick Bite  Patient had recent bug bite on L shoulder that was itchy. Had her  pull off bug which looked like a tick and brought it in today. Site of bite remains itchy. Denies fever, chills. Bug bite/symptoms occurred yesterday. Rash  Patient with rash under stomach fold on L side. This has been a chronic issue, but patient feels it is spreading. Has been using nystatin/triamcinolone cream, but rash still spreading. Dyspnea  Patient states that she has been getting subsequently more short of breath as she is walking short distances. Feels this is getting worse in the last few months. Sometimes associated with pressure like chest pain which does not radiate  Has not experienced this in a while. Has a cardiologist in [place] that she saw about a year ago. ROS:  Gen: denies fever, chills, fatigue, weight loss, weight gain  HEENT:denies blurry vision, nasal congestion, sore throat  Resp: denies dypsnea, cough, wheezing  CV: denies chest pain, palpitations, lower extremity edema  Abd: denies nausea, vomiting, diarrhea, constipation      Allergies   Allergen Reactions    Sulfa (Sulfonamide Antibiotics) Hives    Zoloft [Sertraline] Hives         Current Outpatient Prescriptions:     doxycycline (ADOXA) 100 mg tablet, Take 2 tabs PO once, Disp: 2 Tab, Rfl: 0    nystatin (MYCOSTATIN) powder, Apply to affected area up to four times daily, Disp: 15 g, Rfl: 1    DULoxetine (CYMBALTA) 60 mg capsule, Take 60 mg by mouth daily. , Disp: , Rfl:     gabapentin (NEURONTIN) 300 mg capsule, Take 1 capsule five times daily. , Disp: 450 Cap, Rfl: 1    simvastatin (ZOCOR) 20 mg tablet, Take 1 Tab by mouth nightly., Disp: 90 Tab, Rfl: 3    metoprolol succinate (TOPROL-XL) 50 mg XL tablet, Take 1 Tab by mouth daily. , Disp: 90 Tab, Rfl: 4    nabumetone (RELAFEN) 750 mg tablet, Take 1 Tab by mouth two (2) times a day., Disp: 180 Tab, Rfl: 4    VOLTAREN 1 % gel, , Disp: , Rfl:     HYDROcodone-acetaminophen (NORCO) 7.5-325 mg per tablet, Take 1 Tab by mouth every six (6) hours as needed for Pain. Max Daily Amount: 4 Tabs., Disp: 80 Tab, Rfl: 0    Past Medical History:   Diagnosis Date    Anemia     Arthritis     bilateral shoulders, back. Sees rheumatology in [place] Chronic pain     [facility]     Hypercholesterolemia     Hypertension        Past Surgical History:   Procedure Laterality Date    HX APPENDECTOMY      HX CARPAL TUNNEL RELEASE Bilateral     HX HIP REPLACEMENT Bilateral     HX HYSTEROSCOPY      HX KNEE REPLACEMENT Bilateral        History   Smoking Status    Never Smoker   Smokeless Tobacco    Never Used       Social History     Social History    Marital status:      Spouse name: N/A    Number of children: N/A    Years of education: N/A     Social History Main Topics    Smoking status: Never Smoker    Smokeless tobacco: Never Used    Alcohol use No    Drug use: No    Sexual activity: Not on file     Other Topics Concern    Not on file     Social History Narrative       No family history on file.       Objective:     Visit Vitals    /74 (BP 1 Location: Left arm, BP Patient Position: Sitting)    Pulse 63    Temp 98.3 °F (36.8 °C) (Oral)    Resp 20    Ht 5' 0.38\" (1.534 m)    Wt 205 lb 3.2 oz (93.1 kg)    SpO2 93%    BMI 39.57 kg/m2     Gen: alert, oriented, no acute distress  Head: normocephalic, atraumatic  Eyes:sclera clear, conjunctiva clear  Oral: moist mucus membranes, no oral lesions, no pharyngeal exudate, no pharyngeal erythema  Resp: Normal work of breathing, lungs CTAB, no w/r/r  CV: S1, S2 normal.  No murmurs, rubs, or gallops. Abd:  Normal bowel sounds. Soft, not tender, not distended. Skin: small erythematous indurated lesion just below L shoulder where tick was pulled off. L lower abdomen just above groin with erythematous patchy rash. Extremities: no edema    EKG: NSR. No ST elevation/depression. Assessment/ Plan:   Awanda Duane was seen today for rash, other and rash. Diagnoses and all orders for this visit:    Pre-diabetes    Elevated fasting glucose  -     AMB POC HEMOGLOBIN A1C    Dyspnea, unspecified type  -     AMB POC EKG ROUTINE W/ 12 LEADS, INTER & REP  -advised pt that even though EKG looked normal, she needs to follow up with cardiologist for possible stress testing given her progressive dyspnea. She voiced understanding. Tick bite, initial encounter  -     doxycycline (ADOXA) 100 mg tablet; Take 2 tabs PO once  -tick examined, thought to be dog tick and not ioxides  -nonetheless, given appearance of possible EM at bite site, go ahead and give prophylactic dose of doxy for Lyme    Candidal intertrigo  -     nystatin (MYCOSTATIN) powder; Apply to affected area up to four times daily         Verbal and written instructions (see AVS) provided.  Patient expresses understanding of diagnosis and treatment plan. Pieter Carrera.  Robbin Luong MD

## 2017-06-06 ENCOUNTER — HOSPITAL ENCOUNTER (OUTPATIENT)
Dept: LAB | Age: 72
Discharge: HOME | End: 2017-06-06
Payer: MEDICARE

## 2017-06-06 PROCEDURE — 82270 OCCULT BLOOD FECES: CPT

## 2017-06-15 LAB — HEMOCCULT STL QL IA: NEGATIVE

## 2017-06-22 ENCOUNTER — TELEPHONE (OUTPATIENT)
Dept: FAMILY MEDICINE CLINIC | Age: 72
End: 2017-06-22

## 2017-06-22 NOTE — TELEPHONE ENCOUNTER
Called patient. Patient verified her name and . Patient given stool culture results: negative. Patient verbalized understanding.

## 2017-09-28 ENCOUNTER — OFFICE VISIT (OUTPATIENT)
Dept: FAMILY MEDICINE CLINIC | Age: 72
End: 2017-09-28

## 2017-09-28 VITALS
HEIGHT: 60 IN | TEMPERATURE: 98.1 F | WEIGHT: 205 LBS | SYSTOLIC BLOOD PRESSURE: 135 MMHG | HEART RATE: 70 BPM | RESPIRATION RATE: 16 BRPM | DIASTOLIC BLOOD PRESSURE: 80 MMHG | BODY MASS INDEX: 40.25 KG/M2 | OXYGEN SATURATION: 95 %

## 2017-09-28 DIAGNOSIS — Z12.39 BREAST CANCER SCREENING: Primary | ICD-10-CM

## 2017-09-28 DIAGNOSIS — Z23 ENCOUNTER FOR IMMUNIZATION: ICD-10-CM

## 2017-09-28 DIAGNOSIS — Z12.31 ENCOUNTER FOR SCREENING MAMMOGRAM FOR BREAST CANCER: ICD-10-CM

## 2017-09-28 DIAGNOSIS — R10.31 RIGHT LOWER QUADRANT ABDOMINAL PAIN: ICD-10-CM

## 2017-09-28 NOTE — PATIENT INSTRUCTIONS
Vaccine Information Statement    Influenza (Flu) Vaccine (Inactivated or Recombinant): What you need to know    Many Vaccine Information Statements are available in Frisian and other languages. See www.immunize.org/vis  Hojas de Información Sobre Vacunas están disponibles en Español y en muchos otros idiomas. Visite www.immunize.org/vis    1. Why get vaccinated? Influenza (flu) is a contagious disease that spreads around the [place] every year, usually between October and May. Flu is caused by influenza viruses, and is spread mainly by coughing, sneezing, and close contact. Anyone can get flu. Flu strikes suddenly and can last several days. Symptoms vary by age, but can include:   fever/chills   sore throat   muscle aches   fatigue   cough   headache    runny or stuffy nose    Flu can also lead to pneumonia and blood infections, and cause diarrhea and seizures in children. If you have a medical condition, such as heart or lung disease, flu can make it worse. Flu is more dangerous for some people. Infants and young children, people 72years of age and older, pregnant women, and people with certain health conditions or a weakened immune system are at greatest risk. Each year thousands of people in the [facility] die from flu, and many more are hospitalized. Flu vaccine can:   keep you from getting flu,   make flu less severe if you do get it, and   keep you from spreading flu to your family and other people. 2. Inactivated and recombinant flu vaccines    A dose of flu vaccine is recommended every flu season. Children 6 months through 6years of age may need two doses during the same flu season. Everyone else needs only one dose each flu season.        Some inactivated flu vaccines contain a very small amount of a mercury-based preservative called thimerosal. Studies have not shown thimerosal in vaccines to be harmful, but flu vaccines that do not contain thimerosal are available. There is no live flu virus in flu shots. They cannot cause the flu. There are many flu viruses, and they are always changing. Each year a new flu vaccine is made to protect against three or four viruses that are likely to cause disease in the upcoming flu season. But even when the vaccine doesnt exactly match these viruses, it may still provide some protection    Flu vaccine cannot prevent:   flu that is caused by a virus not covered by the vaccine, or   illnesses that look like flu but are not. It takes about 2 weeks for protection to develop after vaccination, and protection lasts through the flu season. 3. Some people should not get this vaccine    Tell the person who is giving you the vaccine:     If you have any severe, life-threatening allergies. If you ever had a life-threatening allergic reaction after a dose of flu vaccine, or have a severe allergy to any part of this vaccine, you may be advised not to get vaccinated. Most, but not all, types of flu vaccine contain a small amount of egg protein.  If you ever had Guillain-Barré Syndrome (also called GBS). Some people with a history of GBS should not get this vaccine. This should be discussed with your doctor.  If you are not feeling well. It is usually okay to get flu vaccine when you have a mild illness, but you might be asked to come back when you feel better. 4. Risks of a vaccine reaction    With any medicine, including vaccines, there is a chance of reactions. These are usually mild and go away on their own, but serious reactions are also possible. Most people who get a flu shot do not have any problems with it.      Minor problems following a flu shot include:    soreness, redness, or swelling where the shot was given     hoarseness   sore, red or itchy eyes   cough   fever   aches   headache   itching   fatigue  If these problems occur, they usually begin soon after the shot and last 1 or 2 days. More serious problems following a flu shot can include the following:     There may be a small increased risk of Guillain-Barré Syndrome (GBS) after inactivated flu vaccine. This risk has been estimated at 1 or 2 additional cases per million people vaccinated. This is much lower than the risk of severe complications from flu, which can be prevented by flu vaccine.  Young children who get the flu shot along with pneumococcal vaccine (PCV13) and/or DTaP vaccine at the same time might be slightly more likely to have a seizure caused by fever. Ask your doctor for more information. Tell your doctor if a child who is getting flu vaccine has ever had a seizure. Problems that could happen after any injected vaccine:      People sometimes faint after a medical procedure, including vaccination. Sitting or lying down for about 15 minutes can help prevent fainting, and injuries caused by a fall. Tell your doctor if you feel dizzy, or have vision changes or ringing in the ears.  Some people get severe pain in the shoulder and have difficulty moving the arm where a shot was given. This happens very rarely.  Any medication can cause a severe allergic reaction. Such reactions from a vaccine are very rare, estimated at about 1 in a million doses, and would happen within a few minutes to a few hours after the vaccination. As with any medicine, there is a very remote chance of a vaccine causing a serious injury or death. The safety of vaccines is always being monitored. For more information, visit: www.cdc.gov/vaccinesafety/    5. What if there is a serious reaction? What should I look for?  Look for anything that concerns you, such as signs of a severe allergic reaction, very high fever, or unusual behavior.     Signs of a severe allergic reaction can include hives, swelling of the face and throat, difficulty breathing, a fast heartbeat, dizziness, and weakness - usually within a few minutes to a few hours after the vaccination. What should I do?  If you think it is a severe allergic reaction or other emergency that cant wait, call 9-1-1 and get the person to the nearest hospital. Otherwise, call your doctor.  Reactions should be reported to the Vaccine Adverse Event Reporting System (VAERS). Your doctor should file this report, or you can do it yourself through  the VAERS web site at www.vaers. [facility].gov, or by calling 9-154.114.1514. VAERS does not give medical advice. 6. The National Vaccine Injury Compensation Program    The [facility] Vaccine Injury Compensation Program (VICP) is a federal program that was created to compensate people who may have been injured by certain vaccines. Persons who believe they may have been injured by a vaccine can learn about the program and about filing a claim by calling 5-340.179.8174 or visiting the [address] website at www.[facility].gov/vaccinecompensation. There is a time limit to file a claim for compensation. 7. How can I learn more?  Ask your healthcare provider. He or she can give you the vaccine package insert or suggest other sources of information.  Call your local or state health department.  Contact the Centers for Disease Control and Prevention (CDC):  - Call 2-505.440.3299 (1-800-CDC-INFO) or  - Visit CDCs website at www.cdc.gov/flu    Vaccine Information Statement   Inactivated Influenza Vaccine   8/7/2015  [address] 499ST-57    Department of Health and Human Services  Centers for Disease Control and Prevention    Office Use Only

## 2017-09-28 NOTE — PROGRESS NOTES
Azam Hagan is a 73 y.o. female who presents for Influenza immunization. She denies any symptoms , reactions or allergies that would exclude them from being immunized today. Risks and adverse reactions were discussed and the VIS was given to them. All questions were addressed. She was observed for 10 min post injection. There were no reactions observed. Diana Wick      Chief Complaint   Patient presents with    Pelvic Pain     Patient is here to be seen for pain on the right side of her groin area. Patient states she also has blisters at the corner of her mouth on the left side.

## 2017-09-28 NOTE — PROGRESS NOTES
Subjective:     Azam Hagan is a 73 y.o. female who presents today with the following:  Chief Complaint   Patient presents with    Pelvic Pain     Patient here to discuss RLQ pain. Pain has been present for a long time, improved when she had her ovaries and uterus removed after noticing some spotting. Pt concerned because per pt the results came back as \"one stage away from cancer\". This was 10 years ago. Patient is worried that the pain is back and that it might represent cancer. Patient notes some nausea, but no vomiting. Appetite is ok. Has not noted blood in stool or per vagina. ROS:  Gen: denies fever, chills, fatigue, weight loss, weight gain  HEENT:denies blurry vision, nasal congestion, sore throat  Resp: denies dypsnea, cough, wheezing  CV: denies chest pain, palpitations, lower extremity edema  Abd: denies nausea, vomiting, diarrhea, constipation  Neuro: denies numbness/tingling    Allergies   Allergen Reactions    Sulfa (Sulfonamide Antibiotics) Hives    Zoloft [Sertraline] Hives         Current Outpatient Prescriptions:     doxycycline (ADOXA) 100 mg tablet, Take 2 tabs PO once, Disp: 2 Tab, Rfl: 0    nystatin (MYCOSTATIN) powder, Apply to affected area up to four times daily, Disp: 15 g, Rfl: 1    DULoxetine (CYMBALTA) 60 mg capsule, Take 60 mg by mouth daily. , Disp: , Rfl:     HYDROcodone-acetaminophen (NORCO) 7.5-325 mg per tablet, Take 1 Tab by mouth every six (6) hours as needed for Pain. Max Daily Amount: 4 Tabs., Disp: 80 Tab, Rfl: 0    gabapentin (NEURONTIN) 300 mg capsule, Take 1 capsule five times daily. , Disp: 450 Cap, Rfl: 1    simvastatin (ZOCOR) 20 mg tablet, Take 1 Tab by mouth nightly., Disp: 90 Tab, Rfl: 3    metoprolol succinate (TOPROL-XL) 50 mg XL tablet, Take 1 Tab by mouth daily. , Disp: 90 Tab, Rfl: 4    nabumetone (RELAFEN) 750 mg tablet, Take 1 Tab by mouth two (2) times a day., Disp: 180 Tab, Rfl: 4    VOLTAREN 1 % gel, , Disp: , Rfl:     Past Medical History:   Diagnosis Date    Anemia     Arthritis     bilateral shoulders, back. Sees rheumatology in [place] Chronic pain     [facility]     Hypercholesterolemia     Hypertension        Past Surgical History:   Procedure Laterality Date    HX APPENDECTOMY      HX CARPAL TUNNEL RELEASE Bilateral     HX HIP REPLACEMENT Bilateral     HX HYSTEROSCOPY      HX KNEE REPLACEMENT Bilateral        History   Smoking Status    Never Smoker   Smokeless Tobacco    Never Used       Social History     Social History    Marital status:      Spouse name: N/A    Number of children: N/A    Years of education: N/A     Social History Main Topics    Smoking status: Never Smoker    Smokeless tobacco: Never Used    Alcohol use No    Drug use: No    Sexual activity: Not Asked     Other Topics Concern    None     Social History Narrative       History reviewed. No pertinent family history. Objective:     Visit Vitals    /80 (BP 1 Location: Left arm, BP Patient Position: Sitting)    Pulse 70    Temp 98.1 °F (36.7 °C) (Oral)    Resp 16    Ht 5' 0.38\" (1.534 m)    Wt 205 lb (93 kg)    SpO2 95%    BMI 39.53 kg/m2     Gen: alert, oriented, no acute distress  Head: normocephalic, atraumatic  Eyes:sclera clear, conjunctiva clear  Oral: moist mucus membranes, no oral lesions, no pharyngeal exudate, no pharyngeal erythema  Neck: symmetric normal sized thyroid, no carotid bruits, no JVD  Resp: Normal work of breathing, lungs CTAB, no w/r/r  CV: S1, S2 normal.  No murmurs, rubs, or gallops. Abd:  Normal bowel sounds. Soft, tender to deep palpation in RLQ    No results found for this visit on 09/28/17. Assessment/ Plan:   Diagnoses and all orders for this visit:    1. Breast cancer screening    2.  Encounter for immunization  -     Influenza virus vaccine ([address]) 72 years and older ([address]45)  -     Administration fee () for Medicare insured patients    3. Encounter for screening mammogram for breast cancer  -     [facility] MAMMO BI SCREENING INCL CAD; Future    4. Right lower quadrant abdominal pain  -     US ABD LTD; Future     Check ultrasound, follow depending on results. Discussed that pain may be secondary to scar tissue from previous surgeries. Mammogram ordered as well. Verbal and written instructions (see AVS) provided.  Patient expresses understanding of diagnosis and treatment plan. Lauren Harris.  Mathew Murillo MD

## 2017-10-07 ENCOUNTER — HOSPITAL ENCOUNTER (OUTPATIENT)
Dept: ULTRASOUND IMAGING | Age: 72
Discharge: HOME | End: 2017-10-07
Attending: FAMILY MEDICINE
Payer: MEDICARE

## 2017-10-07 DIAGNOSIS — R10.31 RIGHT LOWER QUADRANT ABDOMINAL PAIN: ICD-10-CM

## 2017-10-07 PROCEDURE — 76857 US EXAM PELVIC LIMITED: CPT

## 2018-02-20 ENCOUNTER — TELEPHONE (OUTPATIENT)
Dept: FAMILY MEDICINE CLINIC | Age: 73
End: 2018-02-20

## 2018-02-20 DIAGNOSIS — M54.9 ACUTE BACK PAIN, UNSPECIFIED BACK LOCATION, UNSPECIFIED BACK PAIN LATERALITY: ICD-10-CM

## 2018-02-20 DIAGNOSIS — G62.9 NEUROPATHY: Primary | ICD-10-CM

## 2018-02-21 RX ORDER — GABAPENTIN 300 MG/1
CAPSULE ORAL
Qty: 420 CAP | Refills: 0 | Status: SHIPPED | OUTPATIENT
Start: 2018-02-21 | End: 2018-05-08 | Stop reason: SDUPTHER

## 2018-02-28 ENCOUNTER — HOSPITAL ENCOUNTER (OUTPATIENT)
Dept: LAB | Age: 73
Discharge: HOME | End: 2018-02-28
Payer: MEDICARE

## 2018-02-28 ENCOUNTER — OFFICE VISIT (OUTPATIENT)
Dept: FAMILY MEDICINE CLINIC | Age: 73
End: 2018-02-28

## 2018-02-28 VITALS
HEART RATE: 61 BPM | OXYGEN SATURATION: 95 % | HEIGHT: 60 IN | BODY MASS INDEX: 41.43 KG/M2 | DIASTOLIC BLOOD PRESSURE: 80 MMHG | SYSTOLIC BLOOD PRESSURE: 138 MMHG | WEIGHT: 211 LBS | TEMPERATURE: 97.9 F | RESPIRATION RATE: 14 BRPM

## 2018-02-28 DIAGNOSIS — Z00.00 MEDICARE ANNUAL WELLNESS VISIT, SUBSEQUENT: Primary | ICD-10-CM

## 2018-02-28 DIAGNOSIS — Z13.39 SCREENING FOR ALCOHOLISM: ICD-10-CM

## 2018-02-28 DIAGNOSIS — E78.5 HYPERLIPIDEMIA, UNSPECIFIED HYPERLIPIDEMIA TYPE: ICD-10-CM

## 2018-02-28 DIAGNOSIS — R73.03 PRE-DIABETES: ICD-10-CM

## 2018-02-28 DIAGNOSIS — Z71.89 ADVANCED DIRECTIVES, COUNSELING/DISCUSSION: ICD-10-CM

## 2018-02-28 DIAGNOSIS — I10 ESSENTIAL HYPERTENSION: ICD-10-CM

## 2018-02-28 DIAGNOSIS — R73.01 ELEVATED FASTING GLUCOSE: ICD-10-CM

## 2018-02-28 PROBLEM — E66.01 OBESITY, MORBID (HCC): Status: ACTIVE | Noted: 2018-02-28

## 2018-02-28 PROCEDURE — 80053 COMPREHEN METABOLIC PANEL: CPT

## 2018-02-28 PROCEDURE — 80061 LIPID PANEL: CPT

## 2018-02-28 PROCEDURE — 83036 HEMOGLOBIN GLYCOSYLATED A1C: CPT

## 2018-02-28 PROCEDURE — 85025 COMPLETE CBC W/AUTO DIFF WBC: CPT

## 2018-02-28 PROCEDURE — 36415 COLL VENOUS BLD VENIPUNCTURE: CPT

## 2018-02-28 NOTE — PROGRESS NOTES
Chief Complaint   Patient presents with   [facility] Annual Wellness Visit     Patient is here for annual medicare wellness visit.

## 2018-02-28 NOTE — PROGRESS NOTES
This is a Subsequent Medicare Annual Wellness Exam (AWV) (Performed 12 months after IPPE or effective date of Medicare Part B enrollment)    I have reviewed the patient's medical history in detail and updated the computerized patient record. Screening:  Colon Cancer Screening:  FOBT completed in June 2018. Mammogram: scheduled for 3/2/18  Hep C: complete    Immunizations:  Flu: completed on 9/28/17  PNA: complete  Zoster: 2016  TdaP: 2016    Diet/Exercise  Diet: eating poorly due to stress/hospitalizations for   Exercise: see below      Chronic Medical Conditions    HTN   Ken Carlos is a 81 y.o. female with hypertension. Hypertension ROS: taking medications as instructed, no medication side effects noted, no TIA's, no chest pain on exertion, no dyspnea on exertion, no swelling of ankles. New concerns: none, taking meds as directed. Hyperlipidemia:  Patient with PMH hyperlipidemia. Currently taking simvastatin. Medication side effects: none. Exercise:  Was walking previously, was participating  PT (strength training) until December but stopped once  got ill. Tobacco use:  none  Denies chest pain, shortness of breath, dsypnea. Labs last checked      Pre-diabetes  Patient with A1c of 6.0. Exercising as above. Not following particular diet. Has gained weight since last visit. Denies chest pain, dyspnea, blurry vision. Endorses some dry mouth. No frequent urination. Wt Readings from Last 3 Encounters:   02/28/18 211 lb (95.7 kg)   09/28/17 205 lb (93 kg)   05/10/17 205 lb 3.2 oz (93.1 kg)         History     Past Medical History:   Diagnosis Date    Anemia     Arthritis     bilateral shoulders, back.   Sees rheumatology in [place] Chronic pain     [facility]     Hypercholesterolemia     Hypertension       Past Surgical History:   Procedure Laterality Date    HX APPENDECTOMY      HX CARPAL TUNNEL RELEASE Bilateral     HX HIP REPLACEMENT Bilateral     HX HYSTEROSCOPY      HX KNEE REPLACEMENT Bilateral      Current Outpatient Prescriptions   Medication Sig Dispense Refill    gabapentin (NEURONTIN) 300 mg capsule Take 1 capsule five times daily. 420 Cap 0    nystatin (MYCOSTATIN) powder Apply to affected area up to four times daily 15 g 1    DULoxetine (CYMBALTA) 60 mg capsule Take 60 mg by mouth daily.  simvastatin (ZOCOR) 20 mg tablet Take 1 Tab by mouth nightly. 90 Tab 3    metoprolol succinate (TOPROL-XL) 50 mg XL tablet Take 1 Tab by mouth daily. 90 Tab 4    nabumetone (RELAFEN) 750 mg tablet Take 1 Tab by mouth two (2) times a day. 180 Tab 4    HYDROcodone-acetaminophen (NORCO) 7.5-325 mg per tablet Take 1 Tab by mouth every six (6) hours as needed for Pain. Max Daily Amount: 4 Tabs. 80 Tab 0    VOLTAREN 1 % gel        Allergies   Allergen Reactions    Sulfa (Sulfonamide Antibiotics) Hives    Zoloft [Sertraline] Hives     No family history on file. Social History   Substance Use Topics    Smoking status: Never Smoker    Smokeless tobacco: Never Used    Alcohol use No     Patient Active Problem List   Diagnosis Code    Hyperlipidemia E78.5    Pre-diabetes R73.03    Hypertension I10    Depression F32.9    Arthritis M19.90    Degenerative disc disease, lumbar M51.36       Alcohol Risk Factor Screening: You do not drink alcohol or very rarely. Functional Ability and Level of Safety:   Hearing Loss  Hearing is good. Activities of Daily Living  The home contains: no safety equipment. Patient does total self care    Fall Risk  Fall Risk Assessment, last 12 mths 2/28/2018   Able to walk? Yes   Fall in past 12 months?  No   Fall with injury? -   Number of falls in past 12 months -   Fall Risk Score -       Abuse Screen  Patient is not abused    Cognitive Screening   Evaluation of Cognitive Function:  Has your family/caregiver stated any concerns about your memory: no  Normal    Visit Vitals    /82 (BP 1 Location: Left arm, BP Patient Position: Sitting)    Pulse 61    Temp 97.9 °F (36.6 °C) (Oral)    Resp 14    Ht 5' 0.38\" (1.534 m)    Wt 211 lb (95.7 kg)    SpO2 95%    BMI 40.69 kg/m2     Gen: alert, oriented, no acute distress  Head: NCAT  Eyes: PERRLA, sclera clear, conjunctiva clear  Nose: normal turbinates, no rhinorrhee  Oral: moist mucus membranes, no oral lesions, no pharyngeal inflammation or exudate  Neck: no carotid bruits  Resp: Lungs CTAB, no wheezing, rales or rhonchi  CV: Normal rhythm, normal S1/S2, no m/r/g. No LE edema      Patient Care Team   Patient Care Team:  Mainor Lee MD as PCP - General (Family Practice)    Assessment/Plan   Education and counseling provided:  Are appropriate based on today's review and evaluation  End-of-Life planning (with patient's consent)    Diagnoses and all orders for this visit:    1. Elevated fasting glucose  -     HEMOGLOBIN A1C WITH EAG  -     METABOLIC PANEL, COMPREHENSIVE  -     LIPID PANEL    2. Medicare annual wellness visit, subsequent    3. Advanced directives, counseling/discussion  -referred to honoring choices    4. Screening for alcoholism  -     Annual  Alcohol Screen 15 min ()    5. Hyperlipidemia, unspecified hyperlipidemia type  -     CBC WITH AUTOMATED DIFF  -     METABOLIC PANEL, COMPREHENSIVE  -     LIPID PANEL    6. Pre-diabetes  -     HEMOGLOBIN A1C WITH EAG  -     METABOLIC PANEL, COMPREHENSIVE  -     LIPID PANEL    7. Essential hypertension  -     HEMOGLOBIN A1C WITH EAG  -     CBC WITH AUTOMATED DIFF  -     METABOLIC PANEL, COMPREHENSIVE  -     LIPID PANEL    Follow up based on labs. May need to start metformin.     Health Maintenance Due   Topic Date Due    BREAST CANCER SCRN MAMMOGRAM  08/31/2017    MEDICARE YEARLY EXAM  01/12/2018    GLAUCOMA SCREENING Q2Y  03/20/2018     BMI 40  Healthy balanced diet low in carbohydrates, saturated fats and red meats and rich in fiber, protein, fruits and vegetables recommended. Recommended 30 minutes cardiovascular exercise such as brisk walking/running at least 3-5x a week.     Sharon Graham MD

## 2018-02-28 NOTE — PATIENT INSTRUCTIONS

## 2018-02-28 NOTE — ACP (ADVANCE CARE PLANNING)
Advance Care Planning    Advance Care Planning (ACP) Provider Conversation Snapshot    Date of ACP Conversation: 02/28/18  Persons included in Conversation:  patient  Length of ACP Conversation in minutes:  <16 minutes (Non-Billable)    Conversation Outcomes / Follow-Up Plan:   referral made for assistance

## 2018-03-01 LAB
ALBUMIN SERPL-MCNC: 4.4 G/DL (ref 3.5–4.8)
ALBUMIN/GLOB SERPL: 2 {RATIO} (ref 1.2–2.2)
ALP SERPL-CCNC: 82 IU/L (ref 39–117)
ALT SERPL-CCNC: 19 IU/L (ref 0–32)
AST SERPL-CCNC: 16 IU/L (ref 0–40)
BASOPHILS # BLD AUTO: 0 X10E3/UL (ref 0–0.2)
BASOPHILS NFR BLD AUTO: 0 %
BILIRUB SERPL-MCNC: 0.3 MG/DL (ref 0–1.2)
BUN SERPL-MCNC: 16 MG/DL (ref 8–27)
BUN/CREAT SERPL: 22 (ref 12–28)
CALCIUM SERPL-MCNC: 9.3 MG/DL (ref 8.7–10.3)
CHLORIDE SERPL-SCNC: 97 MMOL/L (ref 96–106)
CHOLEST SERPL-MCNC: 159 MG/DL (ref 100–199)
CO2 SERPL-SCNC: 28 MMOL/L (ref 18–29)
CREAT SERPL-MCNC: 0.74 MG/DL (ref 0.57–1)
EOSINOPHIL # BLD AUTO: 0.1 X10E3/UL (ref 0–0.4)
EOSINOPHIL NFR BLD AUTO: 1 %
ERYTHROCYTE [DISTWIDTH] IN BLOOD BY AUTOMATED COUNT: 13.8 % (ref 12.3–15.4)
EST. AVERAGE GLUCOSE BLD GHB EST-MCNC: 137 MG/DL
GFR SERPLBLD CREATININE-BSD FMLA CKD-EPI: 82 ML/MIN/1.73
GFR SERPLBLD CREATININE-BSD FMLA CKD-EPI: 95 ML/MIN/1.73
GLOBULIN SER CALC-MCNC: 2.2 G/DL (ref 1.5–4.5)
GLUCOSE SERPL-MCNC: 122 MG/DL (ref 65–99)
HBA1C MFR BLD: 6.4 % (ref 4.8–5.6)
HCT VFR BLD AUTO: 39.7 % (ref 34–46.6)
HDLC SERPL-MCNC: 50 MG/DL
HGB BLD-MCNC: 13.2 G/DL (ref 11.1–15.9)
IMM GRANULOCYTES # BLD: 0 X10E3/UL (ref 0–0.1)
IMM GRANULOCYTES NFR BLD: 0 %
INTERPRETATION, 910389: NORMAL
LDLC SERPL CALC-MCNC: 87 MG/DL (ref 0–99)
LYMPHOCYTES # BLD AUTO: 2.3 X10E3/UL (ref 0.7–3.1)
LYMPHOCYTES NFR BLD AUTO: 33 %
MCH RBC QN AUTO: 28.7 PG (ref 26.6–33)
MCHC RBC AUTO-ENTMCNC: 33.2 G/DL (ref 31.5–35.7)
MCV RBC AUTO: 86 FL (ref 79–97)
MONOCYTES # BLD AUTO: 0.6 X10E3/UL (ref 0.1–0.9)
MONOCYTES NFR BLD AUTO: 9 %
NEUTROPHILS # BLD AUTO: 3.8 X10E3/UL (ref 1.4–7)
NEUTROPHILS NFR BLD AUTO: 57 %
PLATELET # BLD AUTO: 262 X10E3/UL (ref 150–379)
POTASSIUM SERPL-SCNC: 4.5 MMOL/L (ref 3.5–5.2)
PROT SERPL-MCNC: 6.6 G/DL (ref 6–8.5)
RBC # BLD AUTO: 4.6 X10E6/UL (ref 3.77–5.28)
SODIUM SERPL-SCNC: 139 MMOL/L (ref 134–144)
TRIGL SERPL-MCNC: 108 MG/DL (ref 0–149)
VLDLC SERPL CALC-MCNC: 22 MG/DL (ref 5–40)
WBC # BLD AUTO: 6.8 X10E3/UL (ref 3.4–10.8)

## 2018-03-02 ENCOUNTER — HOSPITAL ENCOUNTER (OUTPATIENT)
Dept: MAMMOGRAPHY | Age: 73
Discharge: HOME | End: 2018-03-02
Attending: FAMILY MEDICINE
Payer: MEDICARE

## 2018-03-02 DIAGNOSIS — Z12.39 SCREENING BREAST EXAMINATION: ICD-10-CM

## 2018-03-02 PROCEDURE — 77067 SCR MAMMO BI INCL CAD: CPT

## 2018-03-14 DIAGNOSIS — E78.5 HYPERLIPIDEMIA, UNSPECIFIED HYPERLIPIDEMIA TYPE: ICD-10-CM

## 2018-03-14 RX ORDER — SIMVASTATIN 20 MG/1
TABLET, FILM COATED ORAL
Qty: 90 TAB | Refills: 3 | Status: SHIPPED | OUTPATIENT
Start: 2018-03-14 | End: 2019-04-01 | Stop reason: SDUPTHER

## 2018-03-14 NOTE — PROGRESS NOTES
Patient verified her name and . Patient notified of lab results. She would like to be referred to Dr. Vania Pace for DM at [phone].

## 2018-03-14 NOTE — PROGRESS NOTES
Please call patient-her labs (cholesterol, kidneys, liver, blood count) are all normal.  Diabetes lab is worse than previously-she would benefit from starting Metformin. Office visit to discuss. Thanks.

## 2018-03-15 DIAGNOSIS — R73.03 PREDIABETES: Primary | ICD-10-CM

## 2018-03-15 RX ORDER — METFORMIN HYDROCHLORIDE 500 MG/1
500 TABLET ORAL 2 TIMES DAILY WITH MEALS
Qty: 60 TAB | Refills: 0 | Status: SHIPPED | OUTPATIENT
Start: 2018-03-15 | End: 2018-04-17 | Stop reason: SDUPTHER

## 2018-04-16 ENCOUNTER — TELEPHONE (OUTPATIENT)
Dept: FAMILY MEDICINE CLINIC | Age: 73
End: 2018-04-16

## 2018-04-17 ENCOUNTER — OFFICE VISIT (OUTPATIENT)
Dept: FAMILY MEDICINE CLINIC | Age: 73
End: 2018-04-17

## 2018-04-17 VITALS
DIASTOLIC BLOOD PRESSURE: 80 MMHG | BODY MASS INDEX: 41.23 KG/M2 | TEMPERATURE: 98 F | RESPIRATION RATE: 12 BRPM | HEART RATE: 68 BPM | HEIGHT: 60 IN | WEIGHT: 210 LBS | SYSTOLIC BLOOD PRESSURE: 130 MMHG

## 2018-04-17 DIAGNOSIS — R73.03 PREDIABETES: ICD-10-CM

## 2018-04-17 DIAGNOSIS — I10 ESSENTIAL HYPERTENSION: ICD-10-CM

## 2018-04-17 DIAGNOSIS — R73.03 PRE-DIABETES: Primary | ICD-10-CM

## 2018-04-17 LAB — GLUCOSE POC: 119 MG/DL

## 2018-04-17 RX ORDER — METFORMIN HYDROCHLORIDE 500 MG/1
500 TABLET ORAL 2 TIMES DAILY WITH MEALS
Qty: 180 TAB | Refills: 3 | Status: SHIPPED | OUTPATIENT
Start: 2018-04-17 | End: 2019-03-25 | Stop reason: SDUPTHER

## 2018-04-17 NOTE — PROGRESS NOTES
Subjective:     Essence Bay is a 81 y.o. female who presents today with the following:  Chief Complaint   Patient presents with    Diabetes     Pre-diabetes  Patient with history of pre-diabetes. Last A1c was 6.4 in Feb.  Patient was started on Metformin about a month ago to help with worsening glucose control. Tolerating medication ok. HTN  Essence Bay is a 81 y.o. female with hypertension. Hypertension ROS: taking medications as instructed, no medication side effects noted, no TIA's, no chest pain on exertion, no dyspnea on exertion, no swelling of ankles. New concerns: BP high today in office      ROS:  Gen: denies fever, chills, fatigue, weight loss, weight gain  HEENT:denies blurry vision, nasal congestion, sore throat  Resp: denies dypsnea, cough, wheezing  CV: denies chest pain, palpitations, lower extremity edema  Abd: denies nausea, vomiting, diarrhea, constipation  Neuro: denies numbness/tingling      Allergies   Allergen Reactions    Sulfa (Sulfonamide Antibiotics) Hives    Zoloft [Sertraline] Hives         Current Outpatient Prescriptions:     metFORMIN (GLUCOPHAGE) 500 mg tablet, Take 1 Tab by mouth two (2) times daily (with meals). Indications: PREVENTION OF TYPE 2 DIABETES MELLITUS, Disp: 180 Tab, Rfl: 3    simvastatin (ZOCOR) 20 mg tablet, TAKE 1 TABLET NIGHTLY, Disp: 90 Tab, Rfl: 3    gabapentin (NEURONTIN) 300 mg capsule, Take 1 capsule five times daily. , Disp: 420 Cap, Rfl: 0    nystatin (MYCOSTATIN) powder, Apply to affected area up to four times daily, Disp: 15 g, Rfl: 1    DULoxetine (CYMBALTA) 60 mg capsule, Take 60 mg by mouth daily. , Disp: , Rfl:     HYDROcodone-acetaminophen (NORCO) 7.5-325 mg per tablet, Take 1 Tab by mouth every six (6) hours as needed for Pain. Max Daily Amount: 4 Tabs., Disp: 80 Tab, Rfl: 0    metoprolol succinate (TOPROL-XL) 50 mg XL tablet, Take 1 Tab by mouth daily. , Disp: 90 Tab, Rfl: 4    nabumetone (RELAFEN) 750 mg tablet, Take 1 Tab by mouth two (2) times a day., Disp: 180 Tab, Rfl: 4    VOLTAREN 1 % gel, , Disp: , Rfl:     Past Medical History:   Diagnosis Date    Anemia     Arthritis     bilateral shoulders, back. Sees rheumatology in [place] (Dr. Kaila Whitaker)   Bari Dixon Chronic pain     [place]    Depression     Hypercholesterolemia     Hypertension        Past Surgical History:   Procedure Laterality Date    HX APPENDECTOMY      HX CARPAL TUNNEL RELEASE Bilateral     HX HIP REPLACEMENT Bilateral     HX HYSTEROSCOPY      HX KNEE REPLACEMENT Bilateral        History   Smoking Status    Never Smoker   Smokeless Tobacco    Never Used       Social History     Social History    Marital status:      Spouse name: N/A    Number of children: N/A    Years of education: N/A     Social History Main Topics    Smoking status: Never Smoker    Smokeless tobacco: Never Used    Alcohol use No    Drug use: No    Sexual activity: Not Asked     Other Topics Concern    None     Social History Narrative       History reviewed. No pertinent family history. Objective:     Visit Vitals    /80    Pulse 68    Temp 98 °F (36.7 °C) (Oral)    Resp 12    Ht 5' 0.38\" (1.534 m)    Wt 210 lb (95.3 kg)    BMI 40.5 kg/m2     Gen: alert, oriented, no acute distress  Head: normocephalic, atraumatic  Eyes:sclera clear, conjunctiva clear  Oral: moist mucus membranes, no oral lesions, no pharyngeal exudate, no pharyngeal erythema  Neck: symmetric normal sized thyroid, no carotid bruits, no JVD  Resp: Normal work of breathing, lungs CTAB, no w/r/r  CV: S1, S2 normal.  No murmurs, rubs, or gallops. Abd:  Normal bowel sounds. Soft, not tender, not distended.     Skin: no rash             Extremities: no edema    Results for orders placed or performed in visit on 04/17/18   AMB POC GLUCOSE BLOOD, BY GLUCOSE MONITORING DEVICE   Result Value Ref Range    Glucose  mg/dL Assessment/ Plan:   Diagnoses and all orders for this visit:    1. Pre-diabetes  -     AMB POC GLUCOSE BLOOD, BY GLUCOSE MONITORING DEVICE  -refilled metformin  -discussed diabetic diet  -discussed blood sugar goals: fasting around 100, all blood sugars less than 140.    2. Essential hypertension  -bp improved on recheck  -continue current meds    3. BMI 40.0-44.9, adult (HCC)  Healthy balanced diet low in carbohydrates, saturated fats and red meats and rich in fiber, protein, fruits and vegetables recommended. Recommended 30 minutes cardiovascular exercise such as brisk walking/running at least 3-5x a week.    -pt having trouble exercising due to arthritis, but will try to at least start walking in the home     Follow up 1 month for fasting labs. Verbal and written instructions (see AVS) provided.  Patient expresses understanding of diagnosis and treatment plan. Leny Dennison.  Sheila Pineda MD

## 2018-04-27 ENCOUNTER — HOSPITAL ENCOUNTER (OUTPATIENT)
Dept: CT IMAGING | Age: 73
Discharge: HOME | End: 2018-04-27
Attending: ORTHOPAEDIC SURGERY
Payer: MEDICARE

## 2018-04-27 DIAGNOSIS — M19.011 OSTEOARTHRITIS OF RIGHT SHOULDER REGION: ICD-10-CM

## 2018-04-27 PROCEDURE — 73200 CT UPPER EXTREMITY W/O DYE: CPT

## 2018-05-03 RX ORDER — METOPROLOL SUCCINATE 50 MG/1
TABLET, EXTENDED RELEASE ORAL
Qty: 90 TAB | Refills: 4 | Status: SHIPPED | OUTPATIENT
Start: 2018-05-03 | End: 2019-07-10 | Stop reason: SDUPTHER

## 2018-05-08 ENCOUNTER — TELEPHONE (OUTPATIENT)
Dept: FAMILY MEDICINE CLINIC | Age: 73
End: 2018-05-08

## 2018-05-08 DIAGNOSIS — M54.9 ACUTE BACK PAIN, UNSPECIFIED BACK LOCATION, UNSPECIFIED BACK PAIN LATERALITY: ICD-10-CM

## 2018-05-08 RX ORDER — GABAPENTIN 300 MG/1
CAPSULE ORAL
Qty: 420 CAP | Refills: 0 | Status: SHIPPED | OUTPATIENT
Start: 2018-05-08

## 2018-05-09 RX ORDER — NABUMETONE 750 MG/1
TABLET, FILM COATED ORAL
Qty: 180 TAB | Refills: 1 | Status: SHIPPED | OUTPATIENT
Start: 2018-05-09 | End: 2018-08-27

## 2018-05-09 RX ORDER — NABUMETONE 750 MG/1
750 TABLET, FILM COATED ORAL 2 TIMES DAILY
Qty: 60 TAB | Refills: 0 | Status: SHIPPED | OUTPATIENT
Start: 2018-05-09 | End: 2018-06-04

## 2018-05-09 NOTE — TELEPHONE ENCOUNTER
Patient calling to get status on med. She needs some sent to [facility] and the rest sent to mail order.

## 2018-06-04 ENCOUNTER — HOSPITAL ENCOUNTER (OUTPATIENT)
Dept: SURGERY | Age: 73
Discharge: HOME | End: 2018-06-04
Payer: MEDICARE

## 2018-06-04 VITALS
OXYGEN SATURATION: 97 % | TEMPERATURE: 98.4 F | RESPIRATION RATE: 18 BRPM | DIASTOLIC BLOOD PRESSURE: 76 MMHG | HEART RATE: 60 BPM | SYSTOLIC BLOOD PRESSURE: 147 MMHG

## 2018-06-04 LAB
ABO + RH BLD: NORMAL
ALBUMIN SERPL-MCNC: 4 G/DL (ref 3.5–5)
ALBUMIN/GLOB SERPL: 1.3 {RATIO} (ref 1.1–2.2)
ALP SERPL-CCNC: 77 U/L (ref 45–117)
ALT SERPL-CCNC: 31 U/L (ref 12–78)
ANION GAP SERPL CALC-SCNC: 6 MMOL/L (ref 5–15)
APPEARANCE UR: CLEAR
AST SERPL-CCNC: 19 U/L (ref 15–37)
ATRIAL RATE: 60 BPM
BACTERIA URNS QL MICRO: NEGATIVE /HPF
BILIRUB SERPL-MCNC: 0.5 MG/DL (ref 0.2–1)
BILIRUB UR QL: NEGATIVE
BLOOD GROUP ANTIBODIES SERPL: NORMAL
BUN SERPL-MCNC: 17 MG/DL (ref 6–20)
BUN/CREAT SERPL: 24 (ref 12–20)
CALCIUM SERPL-MCNC: 9.3 MG/DL (ref 8.5–10.1)
CALCULATED P AXIS, ECG09: 19 DEGREES
CALCULATED R AXIS, ECG10: 26 DEGREES
CALCULATED T AXIS, ECG11: 52 DEGREES
CHLORIDE SERPL-SCNC: 104 MMOL/L (ref 97–108)
CO2 SERPL-SCNC: 30 MMOL/L (ref 21–32)
COLOR UR: NORMAL
CREAT SERPL-MCNC: 0.72 MG/DL (ref 0.55–1.02)
DIAGNOSIS, 93000: NORMAL
EPITH CASTS URNS QL MICRO: NORMAL /LPF
ERYTHROCYTE [DISTWIDTH] IN BLOOD BY AUTOMATED COUNT: 13.5 % (ref 11.5–14.5)
EST. AVERAGE GLUCOSE BLD GHB EST-MCNC: 148 MG/DL
GLOBULIN SER CALC-MCNC: 3.2 G/DL (ref 2–4)
GLUCOSE SERPL-MCNC: 97 MG/DL (ref 65–100)
GLUCOSE UR STRIP.AUTO-MCNC: NEGATIVE MG/DL
HBA1C MFR BLD: 6.8 % (ref 4.2–6.3)
HCT VFR BLD AUTO: 40 % (ref 35–47)
HGB BLD-MCNC: 13.2 G/DL (ref 11.5–16)
HGB UR QL STRIP: NEGATIVE
HYALINE CASTS URNS QL MICRO: NORMAL /LPF (ref 0–5)
INR PPP: 1.1 (ref 0.9–1.1)
KETONES UR QL STRIP.AUTO: NEGATIVE MG/DL
LEUKOCYTE ESTERASE UR QL STRIP.AUTO: NEGATIVE
MCH RBC QN AUTO: 29.9 PG (ref 26–34)
MCHC RBC AUTO-ENTMCNC: 33 G/DL (ref 30–36.5)
MCV RBC AUTO: 90.5 FL (ref 80–99)
NITRITE UR QL STRIP.AUTO: NEGATIVE
NRBC # BLD: 0 K/UL (ref 0–0.01)
NRBC BLD-RTO: 0 PER 100 WBC
P-R INTERVAL, ECG05: 174 MS
PH UR STRIP: 5.5 [PH] (ref 5–8)
PLATELET # BLD AUTO: 248 K/UL (ref 150–400)
PMV BLD AUTO: 10.5 FL (ref 8.9–12.9)
POTASSIUM SERPL-SCNC: 4.2 MMOL/L (ref 3.5–5.1)
PROT SERPL-MCNC: 7.2 G/DL (ref 6.4–8.2)
PROT UR STRIP-MCNC: NEGATIVE MG/DL
PROTHROMBIN TIME: 10.6 SEC (ref 9–11.1)
Q-T INTERVAL, ECG07: 408 MS
QRS DURATION, ECG06: 94 MS
QTC CALCULATION (BEZET), ECG08: 408 MS
RBC # BLD AUTO: 4.42 M/UL (ref 3.8–5.2)
RBC #/AREA URNS HPF: NORMAL /HPF (ref 0–5)
SODIUM SERPL-SCNC: 140 MMOL/L (ref 136–145)
SP GR UR REFRACTOMETRY: 1.01 (ref 1–1.03)
SPECIMEN EXP DATE BLD: NORMAL
UA: UC IF INDICATED,UAUC: NORMAL
UROBILINOGEN UR QL STRIP.AUTO: 0.2 EU/DL (ref 0.2–1)
VENTRICULAR RATE, ECG03: 60 BPM
WBC # BLD AUTO: 7.6 K/UL (ref 3.6–11)
WBC URNS QL MICRO: NORMAL /HPF (ref 0–4)

## 2018-06-04 PROCEDURE — 83036 HEMOGLOBIN GLYCOSYLATED A1C: CPT | Performed by: ORTHOPAEDIC SURGERY

## 2018-06-04 PROCEDURE — 36415 COLL VENOUS BLD VENIPUNCTURE: CPT | Performed by: ORTHOPAEDIC SURGERY

## 2018-06-04 PROCEDURE — 86900 BLOOD TYPING SEROLOGIC ABO: CPT | Performed by: ORTHOPAEDIC SURGERY

## 2018-06-04 PROCEDURE — 81001 URINALYSIS AUTO W/SCOPE: CPT | Performed by: ORTHOPAEDIC SURGERY

## 2018-06-04 PROCEDURE — 80053 COMPREHEN METABOLIC PANEL: CPT | Performed by: ORTHOPAEDIC SURGERY

## 2018-06-04 PROCEDURE — 93005 ELECTROCARDIOGRAM TRACING: CPT

## 2018-06-04 PROCEDURE — 85027 COMPLETE CBC AUTOMATED: CPT | Performed by: ORTHOPAEDIC SURGERY

## 2018-06-04 PROCEDURE — 85610 PROTHROMBIN TIME: CPT | Performed by: ORTHOPAEDIC SURGERY

## 2018-06-04 NOTE — PERIOP NOTES
PAT appointment. Pt reports that  has memory issues. Pt will have friends in the waiting room with her  DOS.

## 2018-06-05 LAB
BACTERIA SPEC CULT: ABNORMAL
BACTERIA SPEC CULT: ABNORMAL
SERVICE CMNT-IMP: ABNORMAL

## 2018-06-06 RX ORDER — MUPIROCIN 20 MG/G
OINTMENT TOPICAL 2 TIMES DAILY
COMMUNITY
Start: 2018-06-06 | End: 2018-06-11

## 2018-06-06 RX ORDER — MUPIROCIN 20 MG/G
OINTMENT TOPICAL 2 TIMES DAILY
Qty: 22 G | Refills: 0 | Status: SHIPPED | OUTPATIENT
Start: 2018-06-06 | End: 2018-06-11

## 2018-06-06 NOTE — ADVANCED PRACTICE NURSE
PC to pt regarding positive nasal cx (MSSA) and need for tx with Mupirocin ointment BID x 5 days to B nares. Allergies reviewed. Rx escribed to pt's pharmacy of choice. PTA medlist updated. Taylor Martinez, assistant to Dr. Lester Cruz notified.   Msg sent to PCP via [address] [place] [address] staff message regarding results of PAT assessment including A1C of 6.8

## 2018-06-13 PROBLEM — M19.011 PRIMARY OSTEOARTHRITIS OF RIGHT SHOULDER: Status: ACTIVE | Noted: 2018-06-13

## 2018-06-20 ENCOUNTER — TELEPHONE (OUTPATIENT)
Dept: FAMILY MEDICINE CLINIC | Age: 73
End: 2018-06-20

## 2018-06-20 NOTE — TELEPHONE ENCOUNTER
Message from [facility]    Pt is requesting a 10 day supply for Duloxetine 30mg, and Duloxetine 60mg, to be sent to [facility]. Best contact number for pharmacy: [phone]. Pt states she's completely out of her medicine.  Pt also states [facility] with need a new Rx for the medicine.  Best contact number for pt: [phone]

## 2018-06-25 RX ORDER — DULOXETIN HYDROCHLORIDE 60 MG/1
CAPSULE, DELAYED RELEASE ORAL
Qty: 90 CAP | Refills: 1 | Status: SHIPPED | OUTPATIENT
Start: 2018-06-25

## 2018-06-25 RX ORDER — DULOXETIN HYDROCHLORIDE 30 MG/1
CAPSULE, DELAYED RELEASE ORAL
Qty: 10 CAP | Refills: 0 | Status: SHIPPED | OUTPATIENT
Start: 2018-06-25 | End: 2018-06-25 | Stop reason: SDUPTHER

## 2018-06-25 RX ORDER — DULOXETIN HYDROCHLORIDE 60 MG/1
CAPSULE, DELAYED RELEASE ORAL
Qty: 10 CAP | Refills: 0 | Status: SHIPPED | OUTPATIENT
Start: 2018-06-25 | End: 2018-06-25 | Stop reason: SDUPTHER

## 2018-06-25 RX ORDER — DULOXETIN HYDROCHLORIDE 30 MG/1
CAPSULE, DELAYED RELEASE ORAL
Qty: 90 CAP | Refills: 1 | Status: SHIPPED | OUTPATIENT
Start: 2018-06-25

## 2018-07-30 DIAGNOSIS — M54.9 ACUTE BACK PAIN, UNSPECIFIED BACK LOCATION, UNSPECIFIED BACK PAIN LATERALITY: ICD-10-CM

## 2018-08-13 ENCOUNTER — HOSPITAL ENCOUNTER (OUTPATIENT)
Dept: CT IMAGING | Age: 73
Discharge: HOME | End: 2018-08-13
Attending: ORTHOPAEDIC SURGERY
Payer: MEDICARE

## 2018-08-13 DIAGNOSIS — M19.012 ARTHRITIS OF LEFT SHOULDER REGION: ICD-10-CM

## 2018-08-13 PROCEDURE — 73200 CT UPPER EXTREMITY W/O DYE: CPT

## 2018-08-21 RX ORDER — GABAPENTIN 300 MG/1
CAPSULE ORAL
Qty: 420 CAP | Refills: 0 | OUTPATIENT
Start: 2018-08-21

## 2018-08-21 NOTE — TELEPHONE ENCOUNTER
Please call patient and see how many times a day she is taking this medication and how many pills she takes at one time. quantitiy requested does not make sense and normally we do not prescribe for 5x/day. Thanks.

## 2018-08-27 ENCOUNTER — HOSPITAL ENCOUNTER (OUTPATIENT)
Dept: SURGERY | Age: 73
Setting detail: OUTPATIENT SURGERY
Discharge: HOME | End: 2018-08-27
Payer: MEDICARE

## 2018-08-27 VITALS
OXYGEN SATURATION: 95 % | SYSTOLIC BLOOD PRESSURE: 131 MMHG | RESPIRATION RATE: 16 BRPM | TEMPERATURE: 98.2 F | DIASTOLIC BLOOD PRESSURE: 64 MMHG | HEART RATE: 63 BPM

## 2018-08-27 LAB
ABO + RH BLD: NORMAL
APPEARANCE UR: CLEAR
BACTERIA URNS QL MICRO: NEGATIVE /HPF
BILIRUB UR QL: NEGATIVE
BLOOD GROUP ANTIBODIES SERPL: NORMAL
COLOR UR: NORMAL
EPITH CASTS URNS QL MICRO: NORMAL /LPF
EST. AVERAGE GLUCOSE BLD GHB EST-MCNC: 143 MG/DL
GLUCOSE UR STRIP.AUTO-MCNC: NEGATIVE MG/DL
HBA1C MFR BLD: 6.6 % (ref 4.2–6.3)
HGB UR QL STRIP: NEGATIVE
HYALINE CASTS URNS QL MICRO: NORMAL /LPF (ref 0–5)
KETONES UR QL STRIP.AUTO: NEGATIVE MG/DL
LEUKOCYTE ESTERASE UR QL STRIP.AUTO: NEGATIVE
NITRITE UR QL STRIP.AUTO: NEGATIVE
PH UR STRIP: 7.5 [PH] (ref 5–8)
PROT UR STRIP-MCNC: NEGATIVE MG/DL
RBC #/AREA URNS HPF: NORMAL /HPF (ref 0–5)
SP GR UR REFRACTOMETRY: 1.01 (ref 1–1.03)
SPECIMEN EXP DATE BLD: NORMAL
UA: UC IF INDICATED,UAUC: NORMAL
UROBILINOGEN UR QL STRIP.AUTO: 0.2 EU/DL (ref 0.2–1)
WBC URNS QL MICRO: NORMAL /HPF (ref 0–4)

## 2018-08-27 PROCEDURE — 83036 HEMOGLOBIN GLYCOSYLATED A1C: CPT | Performed by: ORTHOPAEDIC SURGERY

## 2018-08-27 PROCEDURE — 86900 BLOOD TYPING SEROLOGIC ABO: CPT | Performed by: ORTHOPAEDIC SURGERY

## 2018-08-27 PROCEDURE — 81001 URINALYSIS AUTO W/SCOPE: CPT | Performed by: ORTHOPAEDIC SURGERY

## 2018-08-27 PROCEDURE — 36415 COLL VENOUS BLD VENIPUNCTURE: CPT | Performed by: ORTHOPAEDIC SURGERY

## 2018-08-28 LAB
BACTERIA SPEC CULT: NORMAL
BACTERIA SPEC CULT: NORMAL
SERVICE CMNT-IMP: NORMAL

## 2018-09-11 ENCOUNTER — CLINICAL SUPPORT (OUTPATIENT)
Dept: FAMILY MEDICINE CLINIC | Age: 73
End: 2018-09-11

## 2018-09-11 DIAGNOSIS — Z23 ENCOUNTER FOR IMMUNIZATION: ICD-10-CM

## 2018-09-11 NOTE — PROGRESS NOTES
Campos Vargas is a 81 y.o. female who presents for Influenza immunizations. She denies any symptoms , reactions or allergies that would exclude them from being immunized today. Risks and adverse reactions were discussed and the VIS was given to them. All questions were addressed. She was observed for 10 min post injection. There were no reactions observed.     Jaden Maria

## 2018-09-11 NOTE — PATIENT INSTRUCTIONS
Vaccine Information Statement    Influenza (Flu) Vaccine (Inactivated or Recombinant): What you need to know    Many Vaccine Information Statements are available in Portuguese and other languages. See www.immunize.org/vis  Hojas de Información Sobre Vacunas están disponibles en Español y en muchos otros idiomas. Visite www.immunize.org/vis    1. Why get vaccinated? Influenza (flu) is a contagious disease that spreads around the [place] every year, usually between October and May. Flu is caused by influenza viruses, and is spread mainly by coughing, sneezing, and close contact. Anyone can get flu. Flu strikes suddenly and can last several days. Symptoms vary by age, but can include:   fever/chills   sore throat   muscle aches   fatigue   cough   headache    runny or stuffy nose    Flu can also lead to pneumonia and blood infections, and cause diarrhea and seizures in children. If you have a medical condition, such as heart or lung disease, flu can make it worse. Flu is more dangerous for some people. Infants and young children, people 72years of age and older, pregnant women, and people with certain health conditions or a weakened immune system are at greatest risk. Each year thousands of people in the [facility] die from flu, and many more are hospitalized. Flu vaccine can:   keep you from getting flu,   make flu less severe if you do get it, and   keep you from spreading flu to your family and other people. 2. Inactivated and recombinant flu vaccines    A dose of flu vaccine is recommended every flu season. Children 6 months through 6years of age may need two doses during the same flu season. Everyone else needs only one dose each flu season.        Some inactivated flu vaccines contain a very small amount of a mercury-based preservative called thimerosal. Studies have not shown thimerosal in vaccines to be harmful, but flu vaccines that do not contain thimerosal are available. There is no live flu virus in flu shots. They cannot cause the flu. There are many flu viruses, and they are always changing. Each year a new flu vaccine is made to protect against three or four viruses that are likely to cause disease in the upcoming flu season. But even when the vaccine doesnt exactly match these viruses, it may still provide some protection    Flu vaccine cannot prevent:   flu that is caused by a virus not covered by the vaccine, or   illnesses that look like flu but are not. It takes about 2 weeks for protection to develop after vaccination, and protection lasts through the flu season. 3. Some people should not get this vaccine    Tell the person who is giving you the vaccine:     If you have any severe, life-threatening allergies. If you ever had a life-threatening allergic reaction after a dose of flu vaccine, or have a severe allergy to any part of this vaccine, you may be advised not to get vaccinated. Most, but not all, types of flu vaccine contain a small amount of egg protein.  If you ever had Guillain-Barré Syndrome (also called GBS). Some people with a history of GBS should not get this vaccine. This should be discussed with your doctor.  If you are not feeling well. It is usually okay to get flu vaccine when you have a mild illness, but you might be asked to come back when you feel better. 4. Risks of a vaccine reaction    With any medicine, including vaccines, there is a chance of reactions. These are usually mild and go away on their own, but serious reactions are also possible. Most people who get a flu shot do not have any problems with it.      Minor problems following a flu shot include:    soreness, redness, or swelling where the shot was given     hoarseness   sore, red or itchy eyes   cough   fever   aches   headache   itching   fatigue  If these problems occur, they usually begin soon after the shot and last 1 or 2 days. More serious problems following a flu shot can include the following:     There may be a small increased risk of Guillain-Barré Syndrome (GBS) after inactivated flu vaccine. This risk has been estimated at 1 or 2 additional cases per million people vaccinated. This is much lower than the risk of severe complications from flu, which can be prevented by flu vaccine.  Young children who get the flu shot along with pneumococcal vaccine (PCV13) and/or DTaP vaccine at the same time might be slightly more likely to have a seizure caused by fever. Ask your doctor for more information. Tell your doctor if a child who is getting flu vaccine has ever had a seizure. Problems that could happen after any injected vaccine:      People sometimes faint after a medical procedure, including vaccination. Sitting or lying down for about 15 minutes can help prevent fainting, and injuries caused by a fall. Tell your doctor if you feel dizzy, or have vision changes or ringing in the ears.  Some people get severe pain in the shoulder and have difficulty moving the arm where a shot was given. This happens very rarely.  Any medication can cause a severe allergic reaction. Such reactions from a vaccine are very rare, estimated at about 1 in a million doses, and would happen within a few minutes to a few hours after the vaccination. As with any medicine, there is a very remote chance of a vaccine causing a serious injury or death. The safety of vaccines is always being monitored. For more information, visit: www.cdc.gov/vaccinesafety/    5. What if there is a serious reaction? What should I look for?  Look for anything that concerns you, such as signs of a severe allergic reaction, very high fever, or unusual behavior.     Signs of a severe allergic reaction can include hives, swelling of the face and throat, difficulty breathing, a fast heartbeat, dizziness, and weakness - usually within a few minutes to a few hours after the vaccination. What should I do?  If you think it is a severe allergic reaction or other emergency that cant wait, call 9-1-1 and get the person to the nearest hospital. Otherwise, call your doctor.  Reactions should be reported to the Vaccine Adverse Event Reporting System (VAERS). Your doctor should file this report, or you can do it yourself through  the VAERS web site at www.vaers. [facility].gov, or by calling 2-722.901.4582. VAERS does not give medical advice. 6. The National Vaccine Injury Compensation Program    The [facility] Vaccine Injury Compensation Program (VICP) is a federal program that was created to compensate people who may have been injured by certain vaccines. Persons who believe they may have been injured by a vaccine can learn about the program and about filing a claim by calling 5-648.732.4548 or visiting the [employer] website at www.[facility].gov/vaccinecompensation. There is a time limit to file a claim for compensation. 7. How can I learn more?  Ask your healthcare provider. He or she can give you the vaccine package insert or suggest other sources of information.  Call your local or state health department.  Contact the Centers for Disease Control and Prevention (CDC):  - Call 2-651.745.1174 (1-800-CDC-INFO) or  - Visit CDCs website at www.cdc.gov/flu    Vaccine Information Statement   Inactivated Influenza Vaccine   8/7/2015  [address]illo [id number]-[id number]    Department of Health and Human Services  Centers for Disease Control and Prevention    Office Use Only

## 2018-09-11 NOTE — PROGRESS NOTES
Chief Complaint   Patient presents with    Immunization/Injection     Flu vaccine given right deltoid, IM. Pt tolerated well.

## 2018-09-19 ENCOUNTER — TELEPHONE (OUTPATIENT)
Dept: FAMILY MEDICINE CLINIC | Age: 73
End: 2018-09-19

## 2018-09-21 ENCOUNTER — OFFICE VISIT (OUTPATIENT)
Dept: FAMILY MEDICINE CLINIC | Age: 73
End: 2018-09-21

## 2018-09-21 VITALS
WEIGHT: 213 LBS | DIASTOLIC BLOOD PRESSURE: 76 MMHG | BODY MASS INDEX: 39.2 KG/M2 | HEART RATE: 68 BPM | HEIGHT: 62 IN | OXYGEN SATURATION: 97 % | RESPIRATION RATE: 12 BRPM | SYSTOLIC BLOOD PRESSURE: 138 MMHG | TEMPERATURE: 98.6 F

## 2018-09-21 DIAGNOSIS — E78.5 HYPERLIPIDEMIA, UNSPECIFIED HYPERLIPIDEMIA TYPE: ICD-10-CM

## 2018-09-21 DIAGNOSIS — M54.5 CHRONIC BILATERAL LOW BACK PAIN, WITH SCIATICA PRESENCE UNSPECIFIED: ICD-10-CM

## 2018-09-21 DIAGNOSIS — G89.29 CHRONIC BILATERAL LOW BACK PAIN, WITH SCIATICA PRESENCE UNSPECIFIED: ICD-10-CM

## 2018-09-21 DIAGNOSIS — R73.03 PREDIABETES: ICD-10-CM

## 2018-09-21 DIAGNOSIS — I10 ESSENTIAL HYPERTENSION: ICD-10-CM

## 2018-09-21 DIAGNOSIS — B37.83 ANGULAR CHEILITIS WITH CANDIDIASIS: Primary | ICD-10-CM

## 2018-09-21 RX ORDER — NYSTATIN AND TRIAMCINOLONE ACETONIDE 100000; 1 [USP'U]/G; MG/G
OINTMENT TOPICAL DAILY
Qty: 15 G | Refills: 0 | Status: SHIPPED | OUTPATIENT
Start: 2018-09-21 | End: 2020-09-16 | Stop reason: SDUPTHER

## 2018-09-21 RX ORDER — MELOXICAM 15 MG/1
15 TABLET ORAL DAILY
COMMUNITY
End: 2019-01-17

## 2018-09-21 NOTE — PROGRESS NOTES
Chief Complaint Patient presents with  Diabetes  Hypertension Patient is here for a routine follow up. 1. Have you been to the ER, urgent care clinic since your last visit? Hospitalized since your last visit? No 
 
2. Have you seen or consulted any other health care providers outside of the [facility] since your last visit? Include any pap smears or colon screening.

## 2018-09-21 NOTE — PROGRESS NOTES
Subjective:  
 
Prisca Us is a 81 y.o. female who presents today with the following: Chief Complaint Patient presents with  Diabetes  Hypertension Pre-diabetes Pt here for follow up of diabetes mellitus type 2. He also has hypertension and hyperlipidemia. Diabetic Review of Systems - medication compliance: compliant all of the time, diabetic diet compliance: noncompliant some of the time. Other symptoms and concerns: none. 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 A1c was checked as a part of pre-op labs in August, was 6.6 at that time. Wt Readings from Last 3 Encounters:  
09/21/18 213 lb (96.6 kg) 08/27/18 210 lb (95.3 kg) 06/01/18 210 lb (95.3 kg) HTN Pirsca Us is a 81 y.o. female with hypertension. Hypertension ROS: taking medications as instructed, no medication side effects noted, no TIA's, no chest pain on exertion, no dyspnea on exertion, no swelling of ankles. New concerns: denies chest pain, denies shortness of breath. Back Pain Patient with PMH DJD back; states she has a history of scoliosis and slipped disks as well. Was seeing orthopedics (Dr. Kris Simpson) but they retired. Patient has not seen orthopedics since that time. Patient notes that her back pain is located in the lower part of her back bilaterally. Mobic and Flexeril help with her pain. ROS: 
Gen: denies fever, chills, fatigue, weight loss, weight gain HEENT:denies blurry vision, nasal congestion, sore throat Resp: denies dypsnea, cough, wheezing CV: denies chest pain, palpitations, lower extremity edema Abd: denies nausea, vomiting, diarrhea, constipation Neuro: denies numbness/tingling, heat/cold intolerance Allergies Allergen Reactions  Sulfa (Sulfonamide Antibiotics) Hives  Zoloft [Sertraline] Hives Current Outpatient Prescriptions:   meloxicam (MOBIC) 15 mg tablet, Take 15 mg by mouth daily. , Disp: , Rfl:  
  nystatin-triamcinolone (MYCOLOG) 100,000-0.1 unit/gram-% ointment, Apply  to affected area daily. , Disp: 15 g, Rfl: 0 
  meloxicam (MOBIC) 15 mg tablet, Take 15 mg by mouth., Disp: , Rfl:  
  cyclobenzaprine (FLEXERIL) 10 mg tablet, Take  by mouth three (3) times daily as needed for Muscle Spasm(s). , Disp: , Rfl:  
  TURMERIC, BULK,, by Does Not Apply route. 1000mg daily, Disp: , Rfl:  
  cholecalciferol (VITAMIN D3) 1,000 unit cap, Take 1,000 Units by mouth daily. , Disp: , Rfl:  
  OTHER,NON-FORMULARY,, Omega XL fatty acid 1200mg daily, Disp: , Rfl:  
  DULoxetine (CYMBALTA) 30 mg capsule, Take 1 cap by mouth every evening. (Patient taking differently: Take 30 mg by mouth every evening. Take 1 cap by mouth every evening.), Disp: 90 Cap, Rfl: 1   DULoxetine (CYMBALTA) 60 mg capsule, Take 1 cap by mouth every evening. (Patient taking differently: Take 60 mg by mouth daily. Indications: CHRONIC MUSCULOSKELETAL PAIN), Disp: 90 Cap, Rfl: 1 
  gabapentin (NEURONTIN) 300 mg capsule, TAKE 1 CAPSULE BY MOUTH 5 TIMES DAILY (Patient taking differently: Take 2 capsules int he morning, 3 capsules in the evening, and 1-2 capsules at HS), Disp: 420 Cap, Rfl: 0 
  TOPROL XL 50 mg XL tablet, TAKE 1 TABLET DAILY, Disp: 90 Tab, Rfl: 4 
  metFORMIN (GLUCOPHAGE) 500 mg tablet, Take 1 Tab by mouth two (2) times daily (with meals). Indications: PREVENTION OF TYPE 2 DIABETES MELLITUS, Disp: 180 Tab, Rfl: 3 
  simvastatin (ZOCOR) 20 mg tablet, TAKE 1 TABLET NIGHTLY, Disp: 90 Tab, Rfl: 3 
  VOLTAREN 1 % gel, Apply 2 g to affected area nightly. Bilateral shoulders, Disp: , Rfl:  
 
Past Medical History:  
Diagnosis Date  Adverse effect of anesthesia   
 hard to put to sleep says patient  Anemia  Arthritis   
 bilateral shoulders, back. Sees rheumatology in [place] (Dr. Griselda Balk)  Chronic pain   
 shoulders, back  Degenerative disc disease, lumbar [facility] [place], Dr. Sandrine Ortiz ([place] physician)  Depression  Diabetes ([facility] [address] Pre diabetic: oral medication  Hypercholesterolemia  Hypertension  MRSA (methicillin resistant staph aureus) culture positive 06/04/2018 Treated nasally wit Mupiriocin  Sleep apnea   
 uses CPAP Past Surgical History:  
Procedure Laterality Date [address]  HX CARPAL TUNNEL RELEASE Bilateral   
 HX GYN  2008  
 hysterectomy  HX HIP REPLACEMENT Bilateral   
 HX HYSTEROSCOPY    
 HX KNEE REPLACEMENT Bilateral 2002  HX ORTHOPAEDIC  2000  
 right Hip  HX ORTHOPAEDIC  2004  
 left hip  HX ORTHOPAEDIC  2014  
 right hip revision  HX ORTHOPAEDIC  2003, 2004  
 bilateral CTR History Smoking Status  Never Smoker Smokeless Tobacco  
 Never Used Social History Social History  Marital status:  Spouse name: N/A  
 Number of children: N/A  
 Years of education: N/A Social History Main Topics  Smoking status: Never Smoker  Smokeless tobacco: Never Used  Alcohol use No  
 Drug use: No  
 Sexual activity: Not Asked Other Topics Concern  None Social History Narrative History reviewed. No pertinent family history. Objective:  
 
Visit Vitals  /76  Pulse 68  Temp 98.6 °F (37 °C) (Oral)  Resp 12  Ht 5' 1.5\" (1.562 m)  Wt 213 lb (96.6 kg)  SpO2 97%  BMI 39.59 kg/m2 Gen: alert, oriented, no acute distress Head: normocephalic, atraumatic Eyes:sclera clear, conjunctiva clear Oral: moist mucus membranes, no oral lesions, no pharyngeal exudate, no pharyngeal erythema. Slight erythema at edges/angles of lips bilaterally Resp: Normal work of breathing, lungs CTAB, no w/r/r 
CV: S1, S2 normal.  No murmurs, rubs, or gallops. MSK: Strength 5/5 LE. Some lower lumbar spinal pain. Normal back ROM. Normal gait. No results found for this visit on 18. Assessment/ Plan:  
Diagnoses and all orders for this visit: 
 
1. Angular cheilitis with candidiasis 
-     nystatin-triamcinolone (MYCOLOG) 100,000-0.1 unit/gram-% ointment; Apply  to affected area daily. -pt has been using this for years and would like to continue/get new Rx. Her current ointment  in 2013. Advised her to throw it out. 2. Chronic bilateral low back pain, with sciatica presence unspecified 
-     REFERRAL TO ORTHOPEDICS 3. Hyperlipidemia, unspecified hyperlipidemia type 
-continue current meds 
-pt will return fasting for labs 4. Essential hypertension -bp improved on recheck 
-continue current meds 5. Prediabetes 
-discussed importance of making dietary changes and adding in exercise as able 
-follow up in 2 months for repeat A1c 
-continue metformin Verbal and written instructions (see AVS) provided.  Patient expresses understanding of diagnosis and treatment plan. Alondra Cedillo.  June Peguero MD

## 2018-10-12 ENCOUNTER — OFFICE VISIT (OUTPATIENT)
Dept: NEUROLOGY | Age: 73
End: 2018-10-12

## 2018-10-12 DIAGNOSIS — G31.84 MILD COGNITIVE IMPAIRMENT: Primary | ICD-10-CM

## 2018-10-12 DIAGNOSIS — F43.21 ADJUSTMENT DISORDER WITH DEPRESSED MOOD: ICD-10-CM

## 2018-10-12 DIAGNOSIS — F90.0 ATTENTION DEFICIT HYPERACTIVITY DISORDER (ADHD), INATTENTIVE TYPE, MILD: Chronic | ICD-10-CM

## 2018-10-12 DIAGNOSIS — R41.3 SHORT-TERM MEMORY LOSS: ICD-10-CM

## 2018-10-12 DIAGNOSIS — Z63.79 STRESS DUE TO SPOUSE WITH DEMENTIA: ICD-10-CM

## 2018-10-12 NOTE — PROGRESS NOTES
[address]  [address]   [place] [address] [facility] [address] [facility], [facility] [address]   [phone] Office   [phone] Fax      Neuropsychology    Initial Diagnostic Interview Note      Referral:  Kenya Mckeon MD    Alcides White is a 81 y.o. right handed   female who was unaccompanied to the initial clinical interview on 10/12/18. Please refer to her medical records for details pertaining to her history. Briefly, the patient reported that she completed a Master's Degree in Counseling. She has no history of previously diagnosed LD and/or receipt of special education services. Works part time for [employer] (formerly [employer]). She has progressive memory decline. Forgets things she shouldn't forget. Starts tasks and does not complete. Misplaces things. Forgets the content of conversations. For example, was called to let someone's dog out and later on she realized she had forgotten to do that. Had an appointment with two reps from [employer]formerly [employer]) three days prior and on the day of got a call from restaurant and she had completely forgotten. These are mild and progressive decline going on. No background stroke, meningitis/encephalitis, HYUN Fever, Lupus, Lyme, TBI, sz, etc.  Recently it has gotten worse over the past six months. She was late to this appointment but I was able to see her, but this note is short in that regard. Benadryl to help with sleep at times. Appetite is generally good. She can't walk very far, she has scoliosis and three slipped discs and other orthopedic issues and sees a back doctor soon and also needs both shoulders replaced. Has arthritis. Knees and hips were replaced. She has a history of depression, and finds her mood these days to be off again and on again. Tries to stay active but some days will sit on couch all day. 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 No psychiatrist or counseling.   Caregiver for spouse, and he is frequently \"sick\" and wouldn't come today. She has sleep apnea and uses a CPAP. No known family history of dementia. No previous neuropsych        Neuropsychological Mental Status Exam (NMSE):  Historian: Good  Praxis: No UE apraxia  R/L Orientation: Intact to self and to other  Dress: within normal limits   Weight: within normal limits   Appearance/Hygiene: within normal limits   Gait: within normal limits   Assistive Devices: None  Mood: within normal limits   Affect: within normal limits   Comprehension: within normal limits   Thought Process: within normal limits   Expressive Language: within normal limits   Receptive Language: within normal limits   Motor:  No cognitive or motor perseveration  ETOH: Denied  Tobacco: Denied  Illicit: Denied  SI/HI: Denied  Psychosis: Denied  Insight: Within normal limits  Judgment: Within normal limits  Other Psych:      Past Medical History:   Diagnosis Date    Adverse effect of anesthesia     hard to put to sleep says patient    Anemia     Arthritis     bilateral shoulders, back.   Sees rheumatology in [place] (Dr. Rony Ramos)    Chronic pain     shoulders, back    Degenerative disc disease, lumbar     [place], Dr. Rony Ramos (RA physician)    Depression     Diabetes [facility])     Pre diabetic: oral medication    Hypercholesterolemia     Hypertension     MRSA (methicillin resistant staph aureus) culture positive 06/04/2018    Treated nasally wit Mupiriocin    Sleep apnea     uses CPAP       Past Surgical History:   Procedure Laterality Date    HX APPENDECTOMY  1999    HX CARPAL TUNNEL RELEASE Bilateral     HX GYN  2008    hysterectomy    HX HIP REPLACEMENT Bilateral     HX HYSTEROSCOPY      HX KNEE REPLACEMENT Bilateral 2002    HX ORTHOPAEDIC  2000    right Hip    HX ORTHOPAEDIC  2004    left hip    HX ORTHOPAEDIC  2014    right hip revision    HX ORTHOPAEDIC  2003, 2004    bilateral CTR       Allergies   Allergen Reactions    Sulfa (Sulfonamide Antibiotics) Hives    Zoloft [Sertraline] Hives       History reviewed. No pertinent family history. Social History   Substance Use Topics    Smoking status: Never Smoker    Smokeless tobacco: Never Used    Alcohol use No       Current Outpatient Prescriptions   Medication Sig Dispense Refill    meloxicam (MOBIC) 15 mg tablet Take 15 mg by mouth daily.  nystatin-triamcinolone (MYCOLOG) 100,000-0.1 unit/gram-% ointment Apply  to affected area daily. 15 g 0    cyclobenzaprine (FLEXERIL) 10 mg tablet Take  by mouth three (3) times daily as needed for Muscle Spasm(s).  TURMERIC, BULK, by Does Not Apply route. 1000mg daily      cholecalciferol (VITAMIN D3) 1,000 unit cap Take 1,000 Units by mouth daily.  OTHER,NON-FORMULARY, Omega XL fatty acid 1200mg daily      DULoxetine (CYMBALTA) 30 mg capsule Take 1 cap by mouth every evening. (Patient taking differently: Take 30 mg by mouth every evening. Take 1 cap by mouth every evening.) 90 Cap 1    DULoxetine (CYMBALTA) 60 mg capsule Take 1 cap by mouth every evening. (Patient taking differently: Take 60 mg by mouth daily. Indications: CHRONIC MUSCULOSKELETAL PAIN) 90 Cap 1    gabapentin (NEURONTIN) 300 mg capsule TAKE 1 CAPSULE BY MOUTH 5 TIMES DAILY (Patient taking differently: Take 2 capsules int he morning, 3 capsules in the evening, and 1-2 capsules at HS) 420 Cap 0    TOPROL XL 50 mg XL tablet TAKE 1 TABLET DAILY 90 Tab 4    metFORMIN (GLUCOPHAGE) 500 mg tablet Take 1 Tab by mouth two (2) times daily (with meals). Indications: PREVENTION OF TYPE 2 DIABETES MELLITUS 180 Tab 3    simvastatin (ZOCOR) 20 mg tablet TAKE 1 TABLET NIGHTLY 90 Tab 3    VOLTAREN 1 % gel Apply 2 g to affected area nightly. Bilateral shoulders           Plan:  Obtain authorization for testing from insurance company. Report to follow once testing, scoring, and interpretation completed. ? Organic based neurocognitive issues versus mood disorder or combination of same. ? Problems organic, functional, or both? This note will not be viewable in [address] 81year old with spouse who likely has dementia presents with cognitive decline in the context of a history of depression/anxiety/stress issues. Eval for organic based cognitive decline versus mood (organic versus caregiver stress) or combination of same.

## 2018-10-15 NOTE — PROGRESS NOTES
[address] [facility],[address]  [address]   [place] [address] [place] [address] [place] [address] [address]   [phone] Office   [phone] Fax      Neuropsychological Evaluation Report      Referral:  Rosa Montez MD    Juanita Vargas is a 81 y.o. right handed   female who was unaccompanied to the initial clinical interview on 10/12/18. Please refer to her medical records for details pertaining to her history. Briefly, the patient reported that she completed a Master's Degree in Counseling. She has no history of previously diagnosed LD and/or receipt of special education services. Works part time for [employer]. She has progressive memory decline. Forgets things she shouldn't forget. Starts tasks and does not complete. Misplaces things. Forgets the content of conversations. For example, was called to let someone's dog out and later on she realized she had forgotten to do that. Had an appointment with two reps from [employer] three days prior and on the day of got a call from restaurant and she had completely forgotten. These are mild and progressive decline going on. No background stroke, meningitis/encephalitis, HYUN Fever, Lupus, Lyme, TBI, sz, etc.  Recently it has gotten worse over the past six months. She was late to this appointment but I was able to see her, but this note is short in that regard. Benadryl to help with sleep at times. Appetite is generally good. She can't walk very far, she has scoliosis and three slipped discs and other orthopedic issues and sees a back doctor soon and also needs both shoulders replaced. Has arthritis. Knees and hips were replaced. She has a history of depression, and finds her mood these days to be off again and on again. Tries to stay active but some days will sit on couch all day. 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 No psychiatrist or counseling.   Caregiver for spouse, and he is frequently \"sick\" and wouldn't come today. She has sleep apnea and uses a CPAP. No known family history of dementia. No previous neuropsych        Neuropsychological Mental Status Exam (NMSE):  Historian: Good  Praxis: No UE apraxia  R/L Orientation: Intact to self and to other  Dress: within normal limits   Weight: within normal limits   Appearance/Hygiene: within normal limits   Gait: within normal limits   Assistive Devices: None  Mood: within normal limits   Affect: within normal limits   Comprehension: within normal limits   Thought Process: within normal limits   Expressive Language: within normal limits   Receptive Language: within normal limits   Motor:  No cognitive or motor perseveration  ETOH: Denied  Tobacco: Denied  Illicit: Denied  SI/HI: Denied  Psychosis: Denied  Insight: Within normal limits  Judgment: Within normal limits  Other Psych:      Past Medical History:   Diagnosis Date    Adverse effect of anesthesia     hard to put to sleep says patient    Anemia     Arthritis     bilateral shoulders, back.   Sees [facility]ology in [facility] (Dr. Frances Cadet)    Chronic pain     shoulders, back    Degenerative disc disease, lumbar     [place], Dr. Frances Cadet (RA physician)    Depression     Diabetes [facility])     Pre diabetic: oral medication    Hypercholesterolemia     Hypertension     MRSA (methicillin resistant staph aureus) culture positive 06/04/2018    Treated nasally wit Mupiriocin    Sleep apnea     uses CPAP       Past Surgical History:   Procedure Laterality Date    HX APPENDECTOMY  1999    HX CARPAL TUNNEL RELEASE Bilateral     HX GYN  2008    hysterectomy    HX HIP REPLACEMENT Bilateral     HX HYSTEROSCOPY      HX KNEE REPLACEMENT Bilateral 2002    HX ORTHOPAEDIC  2000    right Hip    HX ORTHOPAEDIC  2004    left hip    HX ORTHOPAEDIC  2014    right hip revision    HX ORTHOPAEDIC  2003, 2004    bilateral CTR       Allergies   Allergen Reactions    Sulfa (Sulfonamide Antibiotics) Hives  Zoloft [Sertraline] Hives       History reviewed. No pertinent family history. Social History   Substance Use Topics    Smoking status: Never Smoker    Smokeless tobacco: Never Used    Alcohol use No       Current Outpatient Prescriptions   Medication Sig Dispense Refill    meloxicam (MOBIC) 15 mg tablet Take 15 mg by mouth daily.  nystatin-triamcinolone (MYCOLOG) 100,000-0.1 unit/gram-% ointment Apply  to affected area daily. 15 g 0    cyclobenzaprine (FLEXERIL) 10 mg tablet Take  by mouth three (3) times daily as needed for Muscle Spasm(s).  TURMERIC, BULK, by Does Not Apply route. 1000mg daily      cholecalciferol (VITAMIN D3) 1,000 unit cap Take 1,000 Units by mouth daily.  OTHER,NON-FORMULARY, Omega XL fatty acid 1200mg daily      DULoxetine (CYMBALTA) 30 mg capsule Take 1 cap by mouth every evening. (Patient taking differently: Take 30 mg by mouth every evening. Take 1 cap by mouth every evening.) 90 Cap 1    DULoxetine (CYMBALTA) 60 mg capsule Take 1 cap by mouth every evening. (Patient taking differently: Take 60 mg by mouth daily. Indications: CHRONIC MUSCULOSKELETAL PAIN) 90 Cap 1    gabapentin (NEURONTIN) 300 mg capsule TAKE 1 CAPSULE BY MOUTH 5 TIMES DAILY (Patient taking differently: Take 2 capsules int he morning, 3 capsules in the evening, and 1-2 capsules at HS) 420 Cap 0    TOPROL XL 50 mg XL tablet TAKE 1 TABLET DAILY 90 Tab 4    metFORMIN (GLUCOPHAGE) 500 mg tablet Take 1 Tab by mouth two (2) times daily (with meals). Indications: PREVENTION OF TYPE 2 DIABETES MELLITUS 180 Tab 3    simvastatin (ZOCOR) 20 mg tablet TAKE 1 TABLET NIGHTLY 90 Tab 3    VOLTAREN 1 % gel Apply 2 g to affected area nightly. Bilateral shoulders           Plan:  Obtain authorization for testing from insurance company. Report to follow once testing, scoring, and interpretation completed. ? Organic based neurocognitive issues versus mood disorder or combination of same.   ? Problems organic, functional, or both? This note will not be viewable in [address] 81year old with spouse who likely has dementia presents with cognitive decline in the context of a history of depression/anxiety/stress issues. Eval for organic based cognitive decline versus mood (organic versus caregiver stress) or combination of same. Neuropsychological Test Results  Patient Testing 10/12/18 Report Completed 10/15/18  A Psychometrist Assisted w/ portions of this evaluation while under my direct  supervision    The following evaluation procedures/tests were administered:      Neuropsychologist Performed, Interpreted, & Reported:  Neuropsychological Mental Status Exam, Revised Memory & Behavior Checklist,  Mini Mental Status Exam, Clock Drawing Test, Jeremy-Melzack Pain Questionnaire, Test Of Premorbid Functioning, History Taking  & Clinical Interview With The Patient, , Review Of Available Records. Psychometrist Administered under Neuropsychologist Supervision & Neuropsychologist Interpreted & Neuropsychologist Reported:  Verbal Fluency Tests, Demetrio & Demetrio - Revised, Trailmaking Test Parts A & B, Wechsler Adult Intelligence Scale - IV, Yousif Continuous Performance Test - III, [employer] - 3, Grooved Pegboard, Ren Depression Inventory - II, Ren Anxiety Inventory, Personality Assessment Inventory. Test Findings:  Test Findings:  Note:  The patients raw data have been compared with currently available norms which include demographic corrections for age, gender, and/or education. Sometimes, the patients scores are compared to demographically similar individuals as close to the patients age, education level, etc., as possible. \"Average\" is viewed as being +/- 1 standard deviation (SD) from the stated mean for a particular test score. \"Low average\" is viewed as being between 1 and 2 SD below the mean, and above average is viewed as being 1 and 2 SD above the mean.   Scores falling in the borderline range (between 1-1/2 and 2 SD below the mean) are viewed with particular attention as to whether they are normal or abnormal neurocognitive test scores. Other methods of inference in analyzing the test data are also utilized, including the pattern and range of scores in the profile, bilateral motor functions, and the presence, if any, of pathognomonic signs. Behaviorally, the patient was friendly and cooperative and appeared motivated to perform well during this examination. Within this context, the results of this evaluation are viewed as a valid reflection of the patients actual neurocognitive and emotional status. The patient's score of 26/30 on the Mini-Mental Status Exam was borderline  In this regard, serial 7s were 3/5 correct. She was not oriented to county. Repetition was impaired. Clock drawing was within normal limits. Her structured word list fluency, as assessed by the FAS Test, was within the below average range with a T score of 43. Category fluency was within the below average range with a T score of 42. Confrontation naming ability, as assessed by the Demetrio & Demetrio - Revised, was within the above average  range at 59/60 correct (T = 63). This pattern of performance is not indicative of a patient who is at increased risk for day-to-day problems with verbal fluency or confrontation naming. The patient was administered the [facility] Continuous Performance Test - III and review of the subscales within this instrument revealed mild to moderate concerns for inattentiveness without  impulsivity. This pattern of performance is indicative of a patient who is at increased risk for day-to-day problems with sustained visual attention/concentration. The patient is not showing problems with working memory capacity (3rd %ile) and especially processing speed (<0.1st %ile) on the WAIS-IV. Her Verbal Comprehension Index score of .   Her Perceptual Reasoning Index score of . These scores reflect a decline in functioning based on an assessment of premorbid functioning. The patient was administered the [address] [place] [address] Test  - 3 and generated a normal to high average range (and positive) learning curve over five repeated auditory word list learning trials. An interference trial was normal.   Free and cued, short and long delayed recall were all well within or above the normal range. Recognition recall was normal.   This pattern of performance is not indicative of a patient who is at increased risk for day-to-day problems with auditory learning and/or memory. Simple timed visual motor sequencing (Trailmaking Test Part A) was within the above average range with a T score of 55. Her performance on a similar, but more complex task of timed visual motor sequencing (Trailmaking Test Part B) was within the below average range with a T score of 43. This pattern of performance is not indicative of a patient who is at increased risk for day-to-day problems with executive functioning. Fine motor dexterity was within the mildly impaired range for her dominant hand (T = 37) and below average for her nondominant hand (T = 42). She reports pain in her right wrist (See Jeremy). The patient rated her current level of pain as \"3/5 Distressing/Annoying\" on the Jeremy-Melzack Pain Questionnaire. She reported pain in her right wrist and shoulders       Her Ren Depression Inventory -II score of 9 was within the minimally depressed range. Her Ren Anxiety Inventory score of 3 reflected minimal anxiety. The patient's responses on the Personality Assessment Inventory were deemed valid for interpretation. Within this context, she is concerned about physical functioning and health matters in general.  The personality profile is generally normal.      Impressions & Recommendations:   This is a predominantly normal range Neuropsychological Evaluation with respect to neurocognitive functioning. In this regard, she is showing impairments with sustained visual attention and dominant handed motor dexterity. She reports pain in her right wrist which may be contributing to this latter issue. Her performance across all other neurocognitive domains assessed, including verbal fluency, confrontation naming, verbal comprehension, perceptual reasoning, auditory learning, auditory memory, processing speed, working memory, and executive functioning remain well within or above the normal range. From an emotional standpoint, there is stress associated with being the caregiver to a spouse who likely has dementia without evidence of additional psychopathology. Thankfully, this profile is inconsistent with even early signs of a dementia issue. A chronic ADHD- Inattentive concern is likely. Anxiety is functional,. I suggest consideration for an appropriate medication for attention if not medically contraindicated along with counseling and respite care for caregiver stress. I am not concerned about competency, driving, day-to-day supervision, etc., from a neurocognitive standpoint and hope that she is relieved by the very positive nature of these test results. She should be encouraged to remain as mentally, socially, and physically active as possible. Baseline now established. Follow up prn. Clinical correlation is, of course, indicated. I will discuss these findings with the patient when she follows up with me in the near future. A follow up Neuropsychological Evaluation is indicated on a prn basis, especially if there are any cognitive and/or emotional changes. 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   If there is any additional information of which I am currently unaware, I would be more than happy to review it upon having it made available to me. Thank you for the opportunity to see this interesting individual.     Sincerely,       Elo Gibson. Suyapa Trevino, EdS    CC:  Dario Luna MD    2 units -62170-  1.75 hours Record review. Review of history provided by patient. Review of collaborative information. Testing by Clinician. Review of raw data. Scoring. Report writing of individual tests administered by Clinician. Integration of individual tests administered by psychometrist (that were previously reported and billed under psychometry code below) with testing by clinician and review of records/history/collaborative information. Case Conceptualization, Report writing. Coordination Of Care. 4 units  -64851 - 3.75 hours Psychometrist test prep, administration, and scoring under clinician's direct supervision. Clinical interpretation of individual tests administered by psychometrist .  Clinician report of individual tests administered by psychometrist.    \"Unit\" is defined by CPT/National Guidelines (31 - 60 minutes). Integral services including scoring of raw data, data interpretation, case conceptualization, report writing etcetera were initiated after the patient finished testing/raw data collected and was completed on the date the report was signed.

## 2018-10-19 ENCOUNTER — OFFICE VISIT (OUTPATIENT)
Dept: FAMILY MEDICINE CLINIC | Age: 73
End: 2018-10-19

## 2018-10-19 VITALS
DIASTOLIC BLOOD PRESSURE: 77 MMHG | TEMPERATURE: 97.8 F | RESPIRATION RATE: 14 BRPM | SYSTOLIC BLOOD PRESSURE: 120 MMHG | WEIGHT: 212 LBS | BODY MASS INDEX: 39.01 KG/M2 | OXYGEN SATURATION: 94 % | HEART RATE: 65 BPM | HEIGHT: 62 IN

## 2018-10-19 DIAGNOSIS — R07.0 THROAT DISCOMFORT: Primary | ICD-10-CM

## 2018-10-19 NOTE — PROGRESS NOTES
MICHAEL Del Valle is a 81 y.o. female who presents with a concern about his throat. Has been told twice by her dental hygienist that she has a yellow discoloration back of her throat and that she should talk with her PCP about the possibility of reflux. She does not have symptoms of stomach or chest pain. Does not have a taste in the back of her throat. Does not have a chronic cough. Is not bothered by trouble sleeping etc.  Has never had an endoscopy. She does take NSAIDs PMHx: 
Past Medical History:  
Diagnosis Date  Adverse effect of anesthesia   
 hard to put to sleep says patient  Anemia  Arthritis   
 bilateral shoulders, back. Sees rheumatology in [place] (Dr. Rony Ramos)  Chronic pain   
 shoulders, back  Degenerative disc disease, lumbar [place], Dr. Rony Ramos (RA physician)  Depression  Diabetes ([facility] Pre diabetic: oral medication  Hypercholesterolemia  Hypertension  MRSA (methicillin resistant staph aureus) culture positive 06/04/2018 Treated nasally wit Mupiriocin  Sleep apnea   
 uses CPAP Meds:  
Current Outpatient Medications Medication Sig Dispense Refill  meloxicam (MOBIC) 15 mg tablet Take 15 mg by mouth daily.  nystatin-triamcinolone (MYCOLOG) 100,000-0.1 unit/gram-% ointment Apply  to affected area daily. 15 g 0  
 cyclobenzaprine (FLEXERIL) 10 mg tablet Take  by mouth three (3) times daily as needed for Muscle Spasm(s).  TURMERIC, BULK, by Does Not Apply route. 1000mg daily  cholecalciferol (VITAMIN D3) 1,000 unit cap Take 1,000 Units by mouth daily.  OTHER,NON-FORMULARY, Omega XL fatty acid 1200mg daily  DULoxetine (CYMBALTA) 30 mg capsule Take 1 cap by mouth every evening. (Patient taking differently: Take 30 mg by mouth every evening. Take 1 cap by mouth every evening.) 90 Cap 1  
 DULoxetine (CYMBALTA) 60 mg capsule Take 1 cap by mouth every evening. (Patient taking differently: Take 60 mg by mouth daily. Indications: CHRONIC MUSCULOSKELETAL PAIN) 90 Cap 1  
 gabapentin (NEURONTIN) 300 mg capsule TAKE 1 CAPSULE BY MOUTH 5 TIMES DAILY (Patient taking differently: Take 2 capsules int he morning, 3 capsules in the evening, and 1-2 capsules at HS) 420 Cap 0  
 TOPROL XL 50 mg XL tablet TAKE 1 TABLET DAILY 90 Tab 4  
 metFORMIN (GLUCOPHAGE) 500 mg tablet Take 1 Tab by mouth two (2) times daily (with meals). Indications: PREVENTION OF TYPE 2 DIABETES MELLITUS 180 Tab 3  
 simvastatin (ZOCOR) 20 mg tablet TAKE 1 TABLET NIGHTLY 90 Tab 3  
 VOLTAREN 1 % gel Apply 2 g to affected area nightly. Bilateral shoulders Allergies: Allergies Allergen Reactions  Sulfa (Sulfonamide Antibiotics) Hives  Zoloft [Sertraline] Hives Smoker: 
Social History Tobacco Use Smoking Status Never Smoker Smokeless Tobacco Never Used ETOH:  
Social History Substance and Sexual Activity Alcohol Use No  
 
 
FH: No family history on file. ROS:  
As listed in HPI. In addition: 
Constitutional:   No headache, fever, fatigue, weight loss or weight gain Cardiac:    No chest pain Resp:   No cough or shortness of breath Neuro   No loss of consciousness, dizziness, seizures Physical Exam: 
Blood pressure 120/77, pulse 65, temperature 97.8 °F (36.6 °C), resp. rate 14, height 5' 1.5\" (1.562 m), weight 212 lb (96.2 kg), SpO2 94 %. GEN: No apparent distress. Alert and oriented and responds to all questions appropriately. NEUROLOGIC:  No focal neurologic deficits. Strength and sensation grossly intact. Coordination and gait grossly intact. EXT: Well perfused. No edema. SKIN: No obvious rashes. Throat examined. There are a few small areas of yellow discoloration on the anterior tonsillar pillars that her hygienist is likely referring to Assessment and Plan Patient presents with a concern of reflux expressed by her hygienist. She is not bothered and she does not have any signs or symptoms of reflux so I would not advise pursuing this further ICD-10-CM ICD-9-CM 1. Throat discomfort R07.0 784.1 AVS given. Pt expressed understanding of instructions

## 2018-11-19 ENCOUNTER — OFFICE VISIT (OUTPATIENT)
Dept: NEUROLOGY | Age: 73
End: 2018-11-19

## 2018-11-19 DIAGNOSIS — G31.84 MILD COGNITIVE IMPAIRMENT: Primary | ICD-10-CM

## 2018-11-19 DIAGNOSIS — F43.21 ADJUSTMENT DISORDER WITH DEPRESSED MOOD: ICD-10-CM

## 2018-11-19 DIAGNOSIS — Z63.79 STRESS DUE TO SPOUSE WITH DEMENTIA: ICD-10-CM

## 2018-11-19 NOTE — PROGRESS NOTES
Office feedback session with the patient today. I reviewed the results of the recent Neuropsychological Evaluation, including discussing individual tests as well as patient's areas of neurocognitive strength versus weakness. Education was provided regarding my diagnostic impressions, and we discussed treatment plan/options. I also answered numerous questions related to the clinical findings, including discussing various methods to improve cognition and mood. No dementia found but stress associated with caregiver stress and mild attention problems. Exam reassuring. To follow with PCP and supportive counseling and consider joining [employer] and respite care also needed. Counseling provided regarding mood and cognition. CBT and supportive psychotherapy techniques were utilized. The patient will follow up with the referring provider, and reported being very pleased with the services provided. Follow [facility] with [facility] prn. 20 minutes with patient, record review, coordination of care. Records provided. We discussed: This is a predominantly normal range Neuropsychological Evaluation with respect to neurocognitive functioning. In this regard, she is showing impairments with sustained visual attention and dominant handed motor dexterity. She reports pain in her right wrist which may be contributing to this latter issue. Her performance across all other neurocognitive domains assessed, including verbal fluency, confrontation naming, verbal comprehension, perceptual reasoning, auditory learning, auditory memory, processing speed, working memory, and executive functioning remain well within or above the normal range. From an emotional standpoint, there is stress associated with being the caregiver to a spouse who likely has dementia without evidence of additional psychopathology.                   Thankfully, this profile is inconsistent with even early signs of a dementia issue.    A chronic ADHD- Inattentive concern is likely. Anxiety is functional,. I suggest consideration for an appropriate medication for attention if not medically contraindicated along with counseling and respite care for caregiver stress. I am not concerned about competency, driving, day-to-day supervision, etc., from a neurocognitive standpoint and hope that she is relieved by the very positive nature of these test results. She should be encouraged to remain as mentally, socially, and physically active as possible. Baseline now established. Follow up prn. Clinical correlation is, of course, indicated. I will discuss these findings with the patient when she follows up with me in the near future.   A follow up Neuropsychological Evaluation is indicated on a prn basis, especially if there are any cognitive and/or emotional changes.       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

## 2018-11-27 ENCOUNTER — OFFICE VISIT (OUTPATIENT)
Dept: FAMILY MEDICINE CLINIC | Age: 73
End: 2018-11-27

## 2018-11-27 VITALS
WEIGHT: 212 LBS | OXYGEN SATURATION: 95 % | HEART RATE: 67 BPM | RESPIRATION RATE: 12 BRPM | TEMPERATURE: 98.1 F | DIASTOLIC BLOOD PRESSURE: 80 MMHG | SYSTOLIC BLOOD PRESSURE: 140 MMHG | BODY MASS INDEX: 39.01 KG/M2 | HEIGHT: 62 IN

## 2018-11-27 DIAGNOSIS — I10 ESSENTIAL HYPERTENSION: ICD-10-CM

## 2018-11-27 DIAGNOSIS — E11.9 CONTROLLED TYPE 2 DIABETES MELLITUS WITHOUT COMPLICATION, WITHOUT LONG-TERM CURRENT USE OF INSULIN (HCC): ICD-10-CM

## 2018-11-27 DIAGNOSIS — E66.01 OBESITY, MORBID (HCC): Primary | ICD-10-CM

## 2018-11-27 DIAGNOSIS — E78.00 HYPERCHOLESTEROLEMIA: ICD-10-CM

## 2018-11-27 DIAGNOSIS — F98.8 ATTENTION DEFICIT DISORDER (ADD) IN ADULT: ICD-10-CM

## 2018-11-27 RX ORDER — MELOXICAM 15 MG/1
15 TABLET ORAL DAILY
COMMUNITY
Start: 2018-11-15 | End: 2020-01-22 | Stop reason: SDUPTHER

## 2018-11-27 RX ORDER — GABAPENTIN 300 MG/1
900 CAPSULE ORAL
COMMUNITY
Start: 2018-02-21 | End: 2019-01-17

## 2018-11-27 NOTE — PROGRESS NOTES
Chief Complaint Patient presents with  Medication Evaluation Patient is here for a follow up 1. Have you been to the ER, urgent care clinic since your last visit? Hospitalized since your last visit? No 
 
2. Have you seen or consulted any other health care providers outside of the [address] [place] [address] since your last visit? Include any pap smears or colon screening. Yes, Dr. Madiha Ghosh.

## 2018-11-27 NOTE — PATIENT INSTRUCTIONS
Lisdexamfetamine (By mouth) Lisdexamfetamine Dimesylate (lis-dex-am-FET-a-meen dye-MES-i-late) Treats attention-deficit/hyperactivity disorder (ADHD) and binge eating disorder. Brand Name(s): Vyvanse There may be other brand names for this medicine. When This Medicine Should Not Be Used: This medicine is not right for everyone. Do not use it if you had an allergic reaction to lisdexamfetamine or to any product that contains amphetamine. How to Use This Medicine:  
Capsule, Chewable Tablet · Take your medicine as directed. Your dose may need to be changed several times to find what works best for you. · It is best to take this medicine in the morning. It may be hard for you to sleep if you take it in the afternoon or evening. · Capsule: ¨ Swallow whole. Do not crush, divide, or chew it. ¨ If you cannot swallow the capsule, you may open the capsule and mix the powder with water, yogurt, or orange juice. Use all of the powder from inside the capsule. Use a spoon to break up any clumps of powder. Eat or drink all of this mixture right away. Do not save any for later. · Chewable tablet: Chew thoroughly before swallowing. · This medicine should come with a Medication Guide. Ask your pharmacist for a copy if you do not have one. · Missed dose: Take a dose as soon as you remember. If it is almost time for your next dose, wait until then and take a regular dose. Do not take extra medicine to make up for a missed dose. · Store the medicine in a closed container at room temperature, away from heat, moisture, and direct light. Drugs and Foods to Avoid: Ask your doctor or pharmacist before using any other medicine, including over-the-counter medicines, vitamins, and herbal products. · Do not use this medicine if you are using or have used an MAO inhibitor within the past 14 days. · Some medicines can affect how lisdexamfetamine works. Tell your doctor if you are using any of the following: ¨ Acetazolamide, ammonium chloride, ascorbic acid, buspirone, fentanyl, hydrochlorothiazide, lithium, methenamine salts, quinidine, ritonavir, sodium acid phosphate, sodium bicarbonate, Elisa's wort, tramadol, or tryptophan supplements ¨ Medicine to treat depression (including desipramine, fluoxetine, paroxetine, protriptyline) ¨ Triptan medicine to treat migraine headaches Hever Littlejohn Warnings While Using This Medicine: · Tell your doctor if you are pregnant or breastfeeding, or if you have kidney disease, heart disease, heart rhythm problems, high blood pressure, blood circulation problems, or a history of heart attack or stroke. Tell your doctor if you or anyone in your family has a history of depression, bipolar disorder, suicide, or mental health problems, or you have a history of drug or alcohol addiction. · This medicine may cause the following problems: 
¨ Serious heart or blood vessel problems, such as heart attack or stroke ¨ Unusual changes in mood or behavior ¨ Slow growth in children ¨ Raynaud phenomenon (problem with the blood circulation in your fingers or toes) ¨ Serotonin syndrome (when used with certain medicines) · This medicine can be habit-forming. Do not use more than your prescribed dose. Call your doctor if you think your medicine is not working. · This medicine may make you dizzy. Do not drive or do anything that could be dangerous until you know how this medicine affects you. · Your doctor will check your progress and the effects of this medicine at regular visits. Keep all appointments. · Keep all medicine out of the reach of children. Never share your medicine with anyone. Possible Side Effects While Using This Medicine:  
Call your doctor right away if you notice any of these side effects: · Allergic reaction: Itching or hives, swelling in your face or hands, swelling or tingling in your mouth or throat, chest tightness, trouble breathing · Anxiety, restlessness, fever, sweating, muscle spasms, twitching, nausea, vomiting, diarrhea, seeing or hearing things that are not there · Blistering, peeling, red skin rash · Chest pain that may spread, trouble breathing, unusual sweating, fainting · Extreme energy, mood or mental changes, confusion, agitation, trouble sleeping, unusual behavior · Fast, pounding, or uneven heartbeat · Numbness or weakness on one side of your body, sudden or severe headache, problems with vision, speech, or walking · Unexplained sores, coldness, numbness, or color changes on your fingers or toes If you notice these less serious side effects, talk with your doctor: · Dry mouth · Loss of appetite, weight loss (in children), stomach pain If you notice other side effects that you think are caused by this medicine, tell your doctor. Call your doctor for medical advice about side effects. You may report side effects to FDA at 9-485-FDA-8009 © 2017 [address] Information is for End User's use only and may not be sold, redistributed or otherwise used for commercial purposes. The above information is an  only. It is not intended as medical advice for individual conditions or treatments. Talk to your doctor, nurse or pharmacist before following any medical regimen to see if it is safe and effective for you.

## 2018-11-28 LAB
ALBUMIN SERPL-MCNC: 4.5 G/DL (ref 3.5–4.8)
ALBUMIN/GLOB SERPL: 2 {RATIO} (ref 1.2–2.2)
ALP SERPL-CCNC: 65 IU/L (ref 39–117)
ALT SERPL-CCNC: 29 IU/L (ref 0–32)
AST SERPL-CCNC: 26 IU/L (ref 0–40)
BASOPHILS # BLD AUTO: 0 X10E3/UL (ref 0–0.2)
BASOPHILS NFR BLD AUTO: 0 %
BILIRUB SERPL-MCNC: 0.4 MG/DL (ref 0–1.2)
BUN SERPL-MCNC: 16 MG/DL (ref 8–27)
BUN/CREAT SERPL: 23 (ref 12–28)
CALCIUM SERPL-MCNC: 8.9 MG/DL (ref 8.7–10.3)
CHLORIDE SERPL-SCNC: 101 MMOL/L (ref 96–106)
CHOLEST SERPL-MCNC: 136 MG/DL (ref 100–199)
CO2 SERPL-SCNC: 25 MMOL/L (ref 20–29)
CREAT SERPL-MCNC: 0.71 MG/DL (ref 0.57–1)
EOSINOPHIL # BLD AUTO: 0.1 X10E3/UL (ref 0–0.4)
EOSINOPHIL NFR BLD AUTO: 2 %
ERYTHROCYTE [DISTWIDTH] IN BLOOD BY AUTOMATED COUNT: 14.1 % (ref 12.3–15.4)
EST. AVERAGE GLUCOSE BLD GHB EST-MCNC: 148 MG/DL
GLOBULIN SER CALC-MCNC: 2.2 G/DL (ref 1.5–4.5)
GLUCOSE SERPL-MCNC: 115 MG/DL (ref 65–99)
HBA1C MFR BLD: 6.8 % (ref 4.8–5.6)
HCT VFR BLD AUTO: 39.8 % (ref 34–46.6)
HDLC SERPL-MCNC: 47 MG/DL
HGB BLD-MCNC: 13.2 G/DL (ref 11.1–15.9)
IMM GRANULOCYTES # BLD: 0 X10E3/UL (ref 0–0.1)
IMM GRANULOCYTES NFR BLD: 0 %
INTERPRETATION, 910389: NORMAL
LDLC SERPL CALC-MCNC: 69 MG/DL (ref 0–99)
LYMPHOCYTES # BLD AUTO: 2.5 X10E3/UL (ref 0.7–3.1)
LYMPHOCYTES NFR BLD AUTO: 33 %
Lab: NORMAL
MCH RBC QN AUTO: 28.6 PG (ref 26.6–33)
MCHC RBC AUTO-ENTMCNC: 33.2 G/DL (ref 31.5–35.7)
MCV RBC AUTO: 86 FL (ref 79–97)
MONOCYTES # BLD AUTO: 0.7 X10E3/UL (ref 0.1–0.9)
MONOCYTES NFR BLD AUTO: 9 %
NEUTROPHILS # BLD AUTO: 4.3 X10E3/UL (ref 1.4–7)
NEUTROPHILS NFR BLD AUTO: 56 %
PLATELET # BLD AUTO: 264 X10E3/UL (ref 150–379)
POTASSIUM SERPL-SCNC: 4.6 MMOL/L (ref 3.5–5.2)
PROT SERPL-MCNC: 6.7 G/DL (ref 6–8.5)
RBC # BLD AUTO: 4.62 X10E6/UL (ref 3.77–5.28)
SODIUM SERPL-SCNC: 140 MMOL/L (ref 134–144)
TRIGL SERPL-MCNC: 98 MG/DL (ref 0–149)
TSH SERPL DL<=0.005 MIU/L-ACNC: 2.15 UIU/ML (ref 0.45–4.5)
VLDLC SERPL CALC-MCNC: 20 MG/DL (ref 5–40)
WBC # BLD AUTO: 7.7 X10E3/UL (ref 3.4–10.8)

## 2018-12-10 ENCOUNTER — TELEPHONE (OUTPATIENT)
Dept: FAMILY MEDICINE CLINIC | Age: 73
End: 2018-12-10

## 2018-12-10 DIAGNOSIS — F98.8 ATTENTION DEFICIT DISORDER (ADD) IN ADULT: ICD-10-CM

## 2018-12-10 NOTE — TELEPHONE ENCOUNTER
Pt is calling wanting to get a call back on her results and she also needs her med she states [facility] went to [facility] and they told her they can't fill it due to the way they received it her vyvanse spoke with Cris Luis in ref to this

## 2018-12-12 NOTE — PROGRESS NOTES
Please call pt. Labs look good overall. Diabetes control is a little worse than previously but not by much. Continue current meds and watch diet.

## 2018-12-12 NOTE — PROGRESS NOTES
Patient verified her name and . Patient given lab results per Dr. Sasha Apple result note: Notes recorded by Sadie Coleman MD on 2018 at 3:21 PM EST  Please call pt.  Labs look good overall.  Diabetes control is a little worse than previously but not by much.  Continue current meds and watch diet. Patient verbalized understanding of results.

## 2019-01-14 DIAGNOSIS — F98.8 ATTENTION DEFICIT DISORDER (ADD) IN ADULT: ICD-10-CM

## 2019-01-15 NOTE — TELEPHONE ENCOUNTER
Spoke with Ms. Arvin Hilton and scheduled her for a medication follow up appointment to discuss an increase in her vyvanse.

## 2019-01-16 ENCOUNTER — TELEPHONE (OUTPATIENT)
Dept: FAMILY MEDICINE CLINIC | Age: 74
End: 2019-01-16

## 2019-01-16 DIAGNOSIS — F98.8 ATTENTION DEFICIT DISORDER (ADD) IN ADULT: ICD-10-CM

## 2019-01-17 RX ORDER — LANOLIN ALCOHOL/MO/W.PET/CERES
3 CREAM (GRAM) TOPICAL
COMMUNITY
End: 2021-06-23 | Stop reason: ALTCHOICE

## 2019-01-17 NOTE — PERIOP NOTES
[facility]  Ambulatory Surgery Unit  Pre-operative Instructions    Procedure Date  1/22/19          Tentative Arrival Time 1330      1. On the day of your procedure, please report to the Ambulatory Surgery Unit Registration Desk and sign in at your designated time. The Ambulatory Surgery Unit is located in [facility] on the [facility] side of the [facility] across from the [address] [place] [address]. Please have all of your health insurance cards and a photo ID. 2. You must have someone with you to drive you home as directed by your surgeon. 3. You may have a light breakfast and take normal morning medications. 4. We recommend you do not drink any alcoholic beverages for 24 hours before and after your procedure. 5. Contact your surgeons office for instructions on the following medications: non-steroidal anti-inflammatory drugs (i.e. Advil, Aleve), vitamins, and supplements. (Some surgeons will want you to stop these medications prior to surgery and others may allow you to take them)   **If you are currently taking Plavix, Coumadin, Aspirin and/or other blood-thinning agents, contact your surgeon for instructions. ** Your surgeon will partner with the physician prescribing these medications to determine if it is safe to stop or if you need to continue taking. Please do not stop taking these medications without instructions from your surgeon. 6. In an effort to help prevent surgical site infection, we ask that you shower with an anti-bacterial soap (i.e. Dial or Safeguard) on the morning of your procedure. Do not apply any lotions, powders, or deodorants after showering. 7. Wear comfortable clothes. Wear glasses instead of contacts. Do not bring any jewelry or money (other than copays or fees as instructed). Do not wear make-up, particularly mascara, the morning of your procedure. Wear your hair loose or down, no ponytails, buns, daniel pins or clips.  All body piercings must be removed. 8. You should understand that if you do not follow these instructions your procedure may be cancelled. If your physical condition changes (i.e. fever, cold or flu) please contact your surgeon as soon as possible. 9. It is important that you be on time. If a situation occurs where you may be late, or if you have any questions or problems, please call [phone].    10. Your procedure time may be subject to change. You will receive a phone call the day prior to confirm your arrival time. I understand a pre-operative phone call will be made to verify my procedure time. In the event that I am not available, I give permission for a message to be left on my answering service and/or with another person?      YES    The above note was reviewed with patient during PAT phone call on 1/17/19, patient verbalized understanding.            ___________________      ___________________      ___________________  (Signature of Patient)          (Witness)                   (Date and Time)

## 2019-01-18 ENCOUNTER — TELEPHONE (OUTPATIENT)
Dept: FAMILY MEDICINE CLINIC | Age: 74
End: 2019-01-18

## 2019-01-18 NOTE — TELEPHONE ENCOUNTER
Ms. Patrizia Israel is out of her vyvvikase. She had an appointment last week but got the times mixed up. She's wanting to know if you would give her enough pills to hold her over until her appointment with you next Friday.  I told her we would only be calling her back if [facility] did want to give her a prescription

## 2019-01-22 ENCOUNTER — APPOINTMENT (OUTPATIENT)
Dept: GENERAL RADIOLOGY | Age: 74
End: 2019-01-22
Attending: PHYSICAL MEDICINE & REHABILITATION
Payer: MEDICARE

## 2019-01-22 ENCOUNTER — HOSPITAL ENCOUNTER (OUTPATIENT)
Age: 74
Setting detail: OUTPATIENT SURGERY
Discharge: HOME | End: 2019-01-22
Attending: PHYSICAL MEDICINE & REHABILITATION | Admitting: PHYSICAL MEDICINE & REHABILITATION
Payer: MEDICARE

## 2019-01-22 VITALS
HEIGHT: 62 IN | RESPIRATION RATE: 16 BRPM | BODY MASS INDEX: 38.46 KG/M2 | TEMPERATURE: 98.7 F | SYSTOLIC BLOOD PRESSURE: 139 MMHG | DIASTOLIC BLOOD PRESSURE: 74 MMHG | HEART RATE: 60 BPM | WEIGHT: 209 LBS | OXYGEN SATURATION: 99 %

## 2019-01-22 LAB
GLUCOSE BLD STRIP.AUTO-MCNC: 92 MG/DL (ref 65–100)
SERVICE CMNT-IMP: NORMAL

## 2019-01-22 PROCEDURE — 74011250636 HC RX REV CODE- 250/636: Performed by: PHYSICAL MEDICINE & REHABILITATION

## 2019-01-22 PROCEDURE — 76210000046 HC AMBSU PH II REC FIRST 0.5 HR: Performed by: PHYSICAL MEDICINE & REHABILITATION

## 2019-01-22 PROCEDURE — 76000 FLUOROSCOPY <1 HR PHYS/QHP: CPT

## 2019-01-22 PROCEDURE — 72020 X-RAY EXAM OF SPINE 1 VIEW: CPT

## 2019-01-22 PROCEDURE — 76030000002 HC AMB SURG OR TIME FIRST 0.: Performed by: PHYSICAL MEDICINE & REHABILITATION

## 2019-01-22 PROCEDURE — 74011000250 HC RX REV CODE- 250: Performed by: PHYSICAL MEDICINE & REHABILITATION

## 2019-01-22 PROCEDURE — 82962 GLUCOSE BLOOD TEST: CPT

## 2019-01-22 PROCEDURE — 77030003665 HC NDL SPN BBMI -A: Performed by: PHYSICAL MEDICINE & REHABILITATION

## 2019-01-22 RX ORDER — METHYLPREDNISOLONE ACETATE 40 MG/ML
40 INJECTION, SUSPENSION INTRA-ARTICULAR; INTRALESIONAL; INTRAMUSCULAR; SOFT TISSUE ONCE
Status: COMPLETED | OUTPATIENT
Start: 2019-01-22 | End: 2019-01-22

## 2019-01-22 RX ORDER — BUPIVACAINE HYDROCHLORIDE 5 MG/ML
5 INJECTION, SOLUTION EPIDURAL; INTRACAUDAL ONCE
Status: COMPLETED | OUTPATIENT
Start: 2019-01-22 | End: 2019-01-22

## 2019-01-22 RX ORDER — LIDOCAINE HYDROCHLORIDE 20 MG/ML
5 INJECTION, SOLUTION INFILTRATION; PERINEURAL ONCE
Status: COMPLETED | OUTPATIENT
Start: 2019-01-22 | End: 2019-01-22

## 2019-01-22 NOTE — PERIOP NOTES
Patient with strong bilateral dorsi and plantar flexion. Injection site without drainage. Ambulates to wheelchair without c/o weakness or pain. Pt discharged via wheelchair, accompanied by RN. Pt discharged awake and alert, respirations equal and unlabored, skin warm, dry, and intact. Pt and family members' questions and concerns addressed prior to discharge.

## 2019-01-22 NOTE — PERIOP NOTES
Ambar Pedraza  1945  [id number]    Situation:  Verbal report given from: Bakari Esparza  Procedure: Procedure(s):  BILATERAL L3-4 L4-5 L5-S1 FACET INJECTION    Background:    Preoperative diagnosis: SPONDYLOSIS OF LUMBAR    Postoperative diagnosis: SPONDYLOSIS OF LUMBAR    :  Dr. Nichelle Thompson:  Intra-procedure medications, procedure, and allergies reviewed          Recommendation:    Discharge patient home after discharge instructions reviewed with patient. Rest until local has worn off.

## 2019-01-22 NOTE — OP NOTES
Facet Steroid Injection Operative Report    Indications: This is a 72 y.o. female who presents with LBP. She was positive for LS DJD. The patient was admitted for surgery as conservative measures have failed. Date of Surgery: 1/22/2019    Preoperative Diagnosis: LS DJD    Postoperative Diagnosis: LS DJD    Surgeon(s) and Role:     Jenny Sheth MD - Primary     Procedure:  Procedure(s):  BILATERAL L3-4 L4-5 L5-S1 FACET INJECTION    Procedure in Detail:  After appropriate informed consent was obtained, the patient was taken to the operating suite and placed in the prone position on the operating table on appropriate padding. The LS region was prepped and draped in the usual sterile fashion. Intraoperative fluoroscopy was used to localize the LS spine. The skin was infiltrated with 2% lidocaine. An 22-g needle was advanced into the Kraig L3-4, L4-5, L5-S1 facets under fluoroscopic guidance. Next, 4ml of 0.5% marcaine and 80mg of Depo-Medrol were injected. The needle was removed from the patient. The patient was then turned back into the supine position on the stretcher and was taken to the Recovery Room in stable condition.     Estimated Blood Loss:  none     Specimens: None       Drains: None          Complications:  None    Signed By: Raji Guillory MD                        January 22, 2019

## 2019-01-22 NOTE — H&P
Procedural Case Note    1/22/2019    (2:02 PM)    Vern Lima    1945   (72 y.o.)    [id number]    CC:  pain    ROS:   Complete ROS obtained, no CP, no SOB, no N or V    PMH:     Past Medical History:   Diagnosis Date    ADHD     Adverse effect of anesthesia     hard to put to sleep says patient    Anemia     Arthritis     bilateral shoulders, back. Sees rheumatology in [place] (Dr. Yoana Bello)    Chronic pain     shoulders, back    [facility] [facility]    Depression     Diabetes [facility])     Pre diabetic: oral medication    Hypercholesterolemia     Hypertension     Memory difficulties     MRSA (methicillin resistant staph aureus) culture positive 06/04/2018    pos for staph, NOT pos for MRSA, pt was treated, neg cx on 08/2018    Sleep apnea     uses CPAP       ALLERGIES:     Allergies   Allergen Reactions    Sulfa (Sulfonamide Antibiotics) Hives    Zoloft [Sertraline] Hives    Dicloxacillin Hives and Itching       MEDS:     Current Facility-Administered Medications   Medication Dose Route Frequency    bupivacaine (PF) (MARCAINE) 0.5 % (5 mg/mL) injection 25 mg  5 mL Epidural ONCE    methylPREDNISolone acetate (DEPO-MEDROL) 40 mg/mL injection 40 mg  40 mg IntraMUSCular ONCE    lidocaine (XYLOCAINE) 20 mg/mL (2 %) injection 100 mg  5 mL IntraDERMal ONCE    iohexol (OMNIPAQUE) 180 mg iodine/mL injection 10 mL  10 mL Intra artICUlar ONCE    sodium bicarbonate (4%) (NEUT) injection 2 mL  2 mL SubCUTAneous ONCE          Visit Vitals  /64   Pulse 63   Temp 97.7 °F (36.5 °C)   Resp 14   Ht 5' 2\" (1.575 m)   Wt 94.8 kg (209 lb)   SpO2 97%   BMI 38.23 kg/m²     PE:  Gen: NAD  Head: normocephalic  Heart: RRR  Lungs: CTA haroon  Abd: NT, ND, soft  Neuro: awake and alert  Skin: intact    IMPRESSION:   LS DJD    Note:  The clinical status was discussed in detail with the patient. The procedure was again discussed and described in detail.   All understand and accept the planned procedure and risks; reject other forms of treatment. All questions are answered.     Aida Ramos MD

## 2019-01-22 NOTE — DISCHARGE INSTRUCTIONS
Discharge Instructions    Lumbar Facet Block/Medial Branch Block    You had a lumbar facet injection today. You will probably have some numbness in your lower back area for the next 6-8 hours. The steroids will slowly become effective, reducing your pain, over the next 2 weeks. You should begin feeling better after a few days, but it may take up to 2 weeks to notice the difference. The benefit you get from your injection will last a variable amount of time, depending on the severity of your spine problem. If the results you experience are significant, but not lasting a long time, you may be a candidate for a procedure that can be longer lasting (radiofrequency ablation of the nerves innervating the facet joints).  Pain:  Most people do not have any increase in pain after this injection. However, you might experience some soreness at the site of the injection. If this happens, putting an icepack over the sore area will help.  Bandage: You have a small bandage covering the site of the injection. You may remove it when you get home.  Restrictions:  Someone should drive you home after the injection. After that, you have no restrictions. You may resume your normal level of activity. You may take a shower or bath, and you may eat normally. You should continue your current exercised and/or therapy routine.  Medications:  Continue your current medications as prescribed. If your pain decreases, you may reduce the amount of your pain medicines. If you stopped taking anticoagulants or blood-thinners before the injection, start them tomorrow. If you have diabetes, your blood sugar may be elevated for a few days. Call your primary doctor with any questions.     Call Dr. Kiley Louis at [phone] if you experience:   Fever (101 degrees Fahrenheit or greater)   Nausea or vomiting   Headache unrelieved by your normal pain medicine   Redness or swelling at the injection site that lasts more than 1 day   New numbness, tingling, weakness, or pain that you didnt have before the injection     If still having pain in 1-2 weeks, call office at [phone] for a follow up appointment. DISCHARGE SUMMARY from Nurse    The following personal items collected during your admission are returned to you:   Dental Appliance: Dental Appliances: None  Vision: Visual Aid: Glasses  Hearing Aid:    Jewelry:    Clothing:    Other Valuables:    Valuables sent to safe: If you were given prescriptions, please review the written information on prescribed medications. · You will receive a Post Operative Call from one of the Recovery Room Nurses on the day after your surgery to check on you. It is very important for us to know how you are recovering after your surgery. · You may receive an e-mail or letter in the mail from [place] regarding your experience with us in the Ambulatory Surgery Unit. Your feedback is valuable to us and we appreciate your participation in the survey. If you have not had your influenza or pneumococcal vaccines, please follow up with your primary care physician. The discharge information has been reviewed with the patient. The patient verbalized understanding.

## 2019-01-25 ENCOUNTER — OFFICE VISIT (OUTPATIENT)
Dept: FAMILY MEDICINE CLINIC | Age: 74
End: 2019-01-25

## 2019-01-25 VITALS
HEIGHT: 62 IN | HEART RATE: 64 BPM | BODY MASS INDEX: 38.28 KG/M2 | RESPIRATION RATE: 18 BRPM | WEIGHT: 208 LBS | TEMPERATURE: 98.7 F | SYSTOLIC BLOOD PRESSURE: 136 MMHG | DIASTOLIC BLOOD PRESSURE: 82 MMHG | OXYGEN SATURATION: 96 %

## 2019-01-25 DIAGNOSIS — E66.01 OBESITY, MORBID (HCC): ICD-10-CM

## 2019-01-25 DIAGNOSIS — I10 ESSENTIAL HYPERTENSION: ICD-10-CM

## 2019-01-25 DIAGNOSIS — Z12.11 SPECIAL SCREENING FOR MALIGNANT NEOPLASMS, COLON: ICD-10-CM

## 2019-01-25 DIAGNOSIS — Z12.39 SPECIAL SCREENING EXAMINATION FOR NEOPLASM OF BREAST: ICD-10-CM

## 2019-01-25 DIAGNOSIS — F98.8 ATTENTION DEFICIT DISORDER (ADD) IN ADULT: Primary | ICD-10-CM

## 2019-01-25 NOTE — PROGRESS NOTES
Subjective: Chief Complaint Patient presents with  Medication Evaluation  
  follow-up HPI: 
Francesca Brennan is a 72 y.o. female presents for follow up appointment. Per Dr Sheila Pineda note: Attention Deficit Disorder Patient recently had neuropsychiatric testing done due to memory concerns on her own part. The testing revealed normal neurocognitive status, but found the patient to have attention deficit issues that would benefit from treatment. Vyvanse was recommended to patient. She would like to try something for symptoms; states she loses focus often and has difficulty completing tasks. 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 She currently takes cymbalta. Started Vyvanse 10mg 12/14/18. Feeling really good on this dose, feels that she is able to focus and complete tasks now. No HA, no tremors, no dizziness, no new insomnia, no abdominal pain. Eating and drinking well. Recently had back injections, says she is doing well since. No hospital, ER or specialist visits since last primary care visit except as noted above. Past Medical History:  
Diagnosis Date  ADHD  Adverse effect of anesthesia   
 hard to put to sleep says patient  Anemia  Arthritis   
 bilateral shoulders, back. Sees rheumatology in [place] (Dr. Nerissa Romero)  Chronic pain   
 shoulders, back  Degenerative disc disease, lumbar [place], Dr. Nerissa Romero ([place] physician)  Depression  Diabetes ([facility] Pre diabetic: oral medication  Hypercholesterolemia  Hypertension  Memory difficulties  MRSA (methicillin resistant staph aureus) culture positive 06/04/2018 pos for staph, NOT pos for MRSA, pt was treated, neg cx on 08/2018  Sleep apnea   
 uses CPAP Social History Tobacco Use  Smoking status: Never Smoker  Smokeless tobacco: Never Used Substance Use Topics  Alcohol use: No  
 Drug use: No  
 
 
Outpatient Medications Marked as Taking for the 1/25/19 encounter (Office Visit) with Nayan Hilliard NP Medication Sig Dispense Refill  lisdexamfetamine (VYVANSE) 10 mg capsule Take 1 Cap (10 mg total) by mouth daily. Max Daily Amount: 10 mg 30 Cap 0  
 melatonin 3 mg tablet Take 3 mg by mouth nightly.  meloxicam (MOBIC) 15 mg tablet Take 15 mg by mouth daily.  nystatin-triamcinolone (MYCOLOG) 100,000-0.1 unit/gram-% ointment Apply  to affected area daily. 15 g 0  
 cyclobenzaprine (FLEXERIL) 10 mg tablet Take 10 mg by mouth three (3) times daily as needed for Muscle Spasm(s).  DULoxetine (CYMBALTA) 30 mg capsule Take 1 cap by mouth every evening. (Patient taking differently: Take 30 mg by mouth every evening. Take 1 cap by mouth every evening.) 90 Cap 1  
 DULoxetine (CYMBALTA) 60 mg capsule Take 1 cap by mouth every evening. (Patient taking differently: Take 60 mg by mouth daily. Indications: CHRONIC MUSCULOSKELETAL PAIN) 90 Cap 1  
 gabapentin (NEURONTIN) 300 mg capsule TAKE 1 CAPSULE BY MOUTH 5 TIMES DAILY (Patient taking differently: Take 3 capsules int he morning, 3 capsules in the evening, and 1-2 capsules at HS) 420 Cap 0  
 TOPROL XL 50 mg XL tablet TAKE 1 TABLET DAILY 90 Tab 4  
 metFORMIN (GLUCOPHAGE) 500 mg tablet Take 1 Tab by mouth two (2) times daily (with meals). Indications: PREVENTION OF TYPE 2 DIABETES MELLITUS 180 Tab 3  
 simvastatin (ZOCOR) 20 mg tablet TAKE 1 TABLET NIGHTLY 90 Tab 3  
 VOLTAREN 1 % gel Apply 2 g to affected area nightly. Bilateral shoulders Allergies Allergen Reactions  Sulfa (Sulfonamide Antibiotics) Hives  Zoloft [Sertraline] Hives  Dicloxacillin Hives and Itching Health Maintenance reviewed - 
 
ROS: 
Gen: no fatigue, no fever, no chills, no unexplained weight loss or weight gain Eyes: no excessive tearing, itching, or discharge Nose: no rhinorrhea, no sinus pain Mouth: no oral lesions, no sore throat, no difficulty swallowing Resp: no shortness of breath, no wheezing, no cough CV: no chest pain, no orthopnea, no paroxysmal nocturnal dyspnea, no lower extremity edema, no palpitations Abd: no nausea, no heartburn, no diarrhea, no constipation, no abdominal pain Neuro: no headaches, no syncope or presyncopal episodes Endo: no polyuria, no polydipsia. : no hematuria, no dysuria, no frequency, no incontinence Heme: no lymphadenopathy, no easy bruising or bleeding MSK: no joint  swelling PE: 
Visit Vitals /82 (BP 1 Location: Left arm, BP Patient Position: Sitting) Pulse 64 Temp 98.7 °F (37.1 °C) (Oral) Resp 18 Ht 5' 2\" (1.575 m) Wt 208 lb (94.3 kg) SpO2 96% BMI 38.04 kg/m² Gen: alert, oriented, no acute distress Head: normocephalic, atraumatic Ears: external auditory canals clear, TMs without erythema or effusion Eyes: pupils equal round reactive to light, sclera clear, conjunctiva clear Oral: moist mucus membranes, no oral lesions, no pharyngeal inflammation or exudate Neck: symmetric normal sized thyroid, no carotid bruits, no jugular vein distention Resp: no increase work of breathing, lungs clear to ausculation bilaterally, no wheezing, rales or rhonchi CV: S1, S2 normal.  No murmurs, rubs, or gallops. Abd: soft, not tender, not distended. No hepatosplenomegaly. Normal bowel sounds. No hernias. No abdominal or renal bruits. Neuro: cranial nerves intact, normal strength and movement in all extremities,sensation intact and symmetric. Skin: no lesion or rash Extremities: no cyanosis or edema Assessment/Plan: 
Differential diagnosis and treatment options reviewed with patient who is in agreement with treatment plan as outlined below. ICD-10-CM ICD-9-CM 1. Attention deficit disorder (ADD) in adult F98.8 314.00   
2. Essential hypertension I10 401.9 3.  Obesity, morbid ([facility] [address] [facility] E66.01 278.01   
 4. Special screening for malignant neoplasms, colon Z12.11 V76.51 OCCULT BLOOD IMMUNOASSAY,DIAGNOSTIC 5. Special screening examination for neoplasm of breast Z12.31 V76.10 JEAN-PAUL 3D SAMI W MAMMO BI SCREENING INCL CAD Doing well on Vyvanse. Continue current dose. No refill needed at this time, will call when she needs refill. BP at goal.  No change in medications at this time. Discussed BMI and healthy weight. Encouraged patient to work to implement changes including diet high in raw fruits and vegetables, lean protein and good fats. Limit refined, processed carbohydrates and sugar. Encouraged regular exercise. Recommended regular cardiovascular exercise 3-6 times per week for 30-60 minutes daily. Follow up three months or sooner if needed. Mammogram and FIT test ordered. I have discussed the diagnosis with the patient and the intended plan as seen in the above orders. The patient has received an after-visit summary and questions were answered concerning future plans. I have discussed medication side effects and warnings with the patient as well. The patient verbalizes understanding and agreement with the plan.

## 2019-01-25 NOTE — PATIENT INSTRUCTIONS
Low Sodium Diet (2,000 Milligram): Care Instructions Your Care Instructions Too much sodium causes your body to hold on to extra water. This can raise your blood pressure and force your heart and kidneys to work harder. In very serious cases, this could cause you to be put in the hospital. It might even be life-threatening. By limiting sodium, you will feel better and lower your risk of serious problems. The most common source of sodium is salt. People get most of the salt in their diet from canned, prepared, and packaged foods. Fast food and restaurant meals also are very high in sodium. Your doctor will probably limit your sodium to less than 2,000 milligrams (mg) a day. This limit counts all the sodium in prepared and packaged foods and any salt you add to your food. Follow-up care is a key part of your treatment and safety. Be sure to make and go to all appointments, and call your doctor if you are having problems. It's also a good idea to know your test results and keep a list of the medicines you take. How can you care for yourself at home? Read food labels · Read labels on cans and food packages. The labels tell you how much sodium is in each serving. Make sure that you look at the serving size. If you eat more than the serving size, you have eaten more sodium. · Food labels also tell you the Percent Daily Value for sodium. Choose products with low Percent Daily Values for sodium. · Be aware that sodium can come in forms other than salt, including monosodium glutamate (MSG), sodium citrate, and sodium bicarbonate (baking soda). MSG is often added to Asian food. When you eat out, you can sometimes ask for food without MSG or added salt. Buy low-sodium foods · Buy foods that are labeled \"unsalted\" (no salt added), \"sodium-free\" (less than 5 mg of sodium per serving), or \"low-sodium\" (less than 140 mg of sodium per serving).  Foods labeled \"reduced-sodium\" and \"light sodium\" may still have too much sodium. Be sure to read the label to see how much sodium you are getting. · Buy fresh vegetables, or frozen vegetables without added sauces. Buy low-sodium versions of canned vegetables, soups, and other canned goods. Prepare low-sodium meals · Cut back on the amount of salt you use in cooking. This will help you adjust to the taste. Do not add salt after cooking. One teaspoon of salt has about 2,300 mg of sodium. · Take the salt shaker off the table. · Flavor your food with garlic, lemon juice, onion, vinegar, herbs, and spices. Do not use soy sauce, lite soy sauce, steak sauce, onion salt, garlic salt, celery salt, mustard, or ketchup on your food. · Use low-sodium salad dressings, sauces, and ketchup. Or make your own salad dressings and sauces without adding salt. · Use less salt (or none) when recipes call for it. You can often use half the salt a recipe calls for without losing flavor. Other foods such as rice, pasta, and grains do not need added salt. · Rinse canned vegetables, and cook them in fresh water. This removes somebut not allof the salt. · Avoid water that is naturally high in sodium or that has been treated with water softeners, which add sodium. Call your local water company to find out the sodium content of your water supply. If you buy bottled water, read the label and choose a sodium-free brand. Avoid high-sodium foods · Avoid eating: 
? Smoked, cured, salted, and canned meat, fish, and poultry. ? Ham, weber, hot dogs, and luncheon meats. ? Regular, hard, and processed cheese and regular peanut butter. ? Crackers with salted tops, and other salted snack foods such as pretzels, chips, and salted popcorn. ? Frozen prepared meals, unless labeled low-sodium. ? Canned and dried soups, broths, and bouillon, unless labeled sodium-free or low-sodium. ? Canned vegetables, unless labeled sodium-free or low-sodium. ? Narinder Caras fries, pizza, tacos, and other fast foods. ? Pickles, olives, ketchup, and other condiments, especially soy sauce, unless labeled sodium-free or low-sodium. Where can you learn more? Go to http://breann-johnnie.info/. Enter K438 in the search box to learn more about \"Low Sodium Diet (2,000 Milligram): Care Instructions. \" Current as of: March 28, 2018 Content Version: 11.9 © 0719-1835 [employer]. Care instructions adapted under license by [employer] (which disclaims liability or warranty for this information). If you have questions about a medical condition or this instruction, always ask your healthcare professional. [address] any warranty or liability for your use of this information.

## 2019-01-28 ENCOUNTER — HOSPITAL ENCOUNTER (OUTPATIENT)
Dept: LAB | Age: 74
Discharge: HOME | End: 2019-01-28
Payer: MEDICARE

## 2019-01-28 PROCEDURE — 82270 OCCULT BLOOD FECES: CPT

## 2019-02-13 DIAGNOSIS — F98.8 ATTENTION DEFICIT DISORDER (ADD) IN ADULT: ICD-10-CM

## 2019-02-13 NOTE — TELEPHONE ENCOUNTER
Pt is calling stating she needs Rose to call in a script for her med    Requested Prescriptions     Pending Prescriptions Disp Refills    lisdexamfetamine (VYVANSE) 10 mg capsule 30 Cap 0     Sig: Take 1 Cap (10 mg total) by mouth daily.   Max Daily Amount: 10 mg

## 2019-03-14 DIAGNOSIS — F98.8 ATTENTION DEFICIT DISORDER (ADD) IN ADULT: ICD-10-CM

## 2019-03-14 NOTE — TELEPHONE ENCOUNTER
Requested Prescriptions     Pending Prescriptions Disp Refills    lisdexamfetamine (VYVANSE) 10 mg capsule 30 Cap 0     Sig: Take 1 Cap (10 mg total) by mouth daily. Max Daily Amount: 10 mg     Requested by patient. Would like to  tomorrow afternoon.

## 2019-03-15 ENCOUNTER — HOSPITAL ENCOUNTER (OUTPATIENT)
Dept: MAMMOGRAPHY | Age: 74
Discharge: HOME | End: 2019-03-15
Attending: NURSE PRACTITIONER
Payer: MEDICARE

## 2019-03-15 DIAGNOSIS — Z12.39 SPECIAL SCREENING EXAMINATION FOR NEOPLASM OF BREAST: ICD-10-CM

## 2019-03-15 PROCEDURE — 77063 BREAST TOMOSYNTHESIS BI: CPT

## 2019-03-25 DIAGNOSIS — R73.03 PREDIABETES: ICD-10-CM

## 2019-03-25 RX ORDER — METFORMIN HYDROCHLORIDE 500 MG/1
500 TABLET ORAL 2 TIMES DAILY WITH MEALS
Qty: 180 TAB | Refills: 3 | Status: SHIPPED | OUTPATIENT
Start: 2019-03-25 | End: 2019-09-24 | Stop reason: SDUPTHER

## 2019-03-25 NOTE — TELEPHONE ENCOUNTER
Requested Prescriptions     Pending Prescriptions Disp Refills    metFORMIN (GLUCOPHAGE) 500 mg tablet 180 Tab 3     Sig: Take 1 Tab by mouth two (2) times daily (with meals). Indications: Prevention of Type 2 Diabetes Mellitus       Request made by pharmacy for 90 day supply.

## 2019-04-01 DIAGNOSIS — E78.5 HYPERLIPIDEMIA, UNSPECIFIED HYPERLIPIDEMIA TYPE: ICD-10-CM

## 2019-04-01 RX ORDER — SIMVASTATIN 20 MG/1
TABLET, FILM COATED ORAL
Qty: 90 TAB | Refills: 3 | Status: SHIPPED | OUTPATIENT
Start: 2019-04-01 | End: 2020-02-05

## 2019-04-01 NOTE — TELEPHONE ENCOUNTER
Requested Prescriptions     Pending Prescriptions Disp Refills    simvastatin (ZOCOR) 20 mg tablet 90 Tab 3     Future Appointments:  No future appointments.    Last Appointment With Me:  10/19/2018

## 2019-04-17 DIAGNOSIS — F98.8 ATTENTION DEFICIT DISORDER (ADD) IN ADULT: ICD-10-CM

## 2019-04-17 NOTE — TELEPHONE ENCOUNTER
Chief Complaint   Patient presents with    Medication Refill     Last refill: 3/14/2019  Last Ov: 1/25/19  : last filled on 3/18/19. Reviewed.
Requested Prescriptions     Pending Prescriptions Disp Refills    lisdexamfetamine (VYVANSE) 10 mg capsule 30 Cap 0     Sig: Take 1 Cap by mouth daily. Max Daily Amount: 10 mg. Requested by patient.
Ejection Fraction(%)

## 2019-05-17 ENCOUNTER — TELEPHONE (OUTPATIENT)
Dept: FAMILY MEDICINE CLINIC | Age: 74
End: 2019-05-17

## 2019-05-17 NOTE — TELEPHONE ENCOUNTER
Patient calling to get more vyvanse but wanted to know if she can be off of it.  According to patient \"because it's not doing me any good\" [phone]

## 2019-05-20 ENCOUNTER — TELEPHONE (OUTPATIENT)
Dept: FAMILY MEDICINE CLINIC | Age: 74
End: 2019-05-20

## 2019-07-10 RX ORDER — METOPROLOL SUCCINATE 50 MG/1
TABLET, EXTENDED RELEASE ORAL
Qty: 90 TAB | Refills: 4 | Status: SHIPPED | OUTPATIENT
Start: 2019-07-10 | End: 2020-09-16 | Stop reason: SDUPTHER

## 2019-07-10 NOTE — TELEPHONE ENCOUNTER
Requested Prescriptions     Pending Prescriptions Disp Refills    metoprolol succinate (TOPROL XL) 50 mg XL tablet 90 Tab 4       Requested by [facility] for 90 day supply.

## 2019-09-20 NOTE — PERIOP NOTES
[facility]  Ambulatory Surgery Unit  Pre-operative Instructions    Procedure Date  9/24/19           Tentative Arrival Time 1330      1. On the day of your procedure, please report to the Ambulatory Surgery Unit Registration Desk and sign in at your designated time. The Ambulatory Surgery Unit is located in [facility] on the [facility] side of the [facility] across from the [address] [place] [address]. Please have all of your health insurance cards and a photo ID. 2. You must have someone with you to drive you home as directed by your surgeon. 3. You may have a light breakfast and take normal morning medications. 4. We recommend you do not drink any alcoholic beverages for 24 hours before and after your procedure. 5. Contact your surgeons office for instructions on the following medications: non-steroidal anti-inflammatory drugs (i.e. Advil, Aleve), vitamins, and supplements. (Some surgeons will want you to stop these medications prior to surgery and others may allow you to take them)   **If you are currently taking Plavix, Coumadin, Aspirin and/or other blood-thinning agents, contact your surgeon for instructions. ** Your surgeon will partner with the physician prescribing these medications to determine if it is safe to stop or if you need to continue taking. Please do not stop taking these medications without instructions from your surgeon. 6. In an effort to help prevent surgical site infection, we ask that you shower with an anti-bacterial soap (i.e. Dial or Safeguard) on the morning of your procedure. Do not apply any lotions, powders, or deodorants after showering. 7. Wear comfortable clothes. Wear glasses instead of contacts. Do not bring any jewelry or money (other than copays or fees as instructed). Do not wear make-up, particularly mascara, the morning of your procedure. Wear your hair loose or down, no ponytails, buns, daniel pins or clips.  All body piercings must be removed. 8. You should understand that if you do not follow these instructions your procedure may be cancelled. If your physical condition changes (i.e. fever, cold or flu) please contact your surgeon as soon as possible. 9. It is important that you be on time. If a situation occurs where you may be late, or if you have any questions or problems, please call [phone].    10. Your procedure time may be subject to change. You will receive a phone call the day prior to confirm your arrival time. I understand a pre-operative phone call will be made to verify my procedure time. In the event that I am not available, I give permission for a message to be left on my answering service and/or with another person?       YES    The above note was reviewed with patient during PAT phone call on 9/20/19, patient verbalized understanding.            ___________________      ___________________      ___________________  (Signature of Patient)          (Witness)                   (Date and Time)

## 2019-09-24 ENCOUNTER — APPOINTMENT (OUTPATIENT)
Dept: GENERAL RADIOLOGY | Age: 74
End: 2019-09-24
Attending: PHYSICAL MEDICINE & REHABILITATION
Payer: MEDICARE

## 2019-09-24 ENCOUNTER — HOSPITAL ENCOUNTER (OUTPATIENT)
Age: 74
Setting detail: OUTPATIENT SURGERY
Discharge: HOME | End: 2019-09-24
Attending: PHYSICAL MEDICINE & REHABILITATION | Admitting: PHYSICAL MEDICINE & REHABILITATION
Payer: MEDICARE

## 2019-09-24 VITALS
OXYGEN SATURATION: 99 % | SYSTOLIC BLOOD PRESSURE: 151 MMHG | HEIGHT: 62 IN | DIASTOLIC BLOOD PRESSURE: 69 MMHG | RESPIRATION RATE: 18 BRPM | WEIGHT: 208 LBS | HEART RATE: 60 BPM | BODY MASS INDEX: 38.28 KG/M2 | TEMPERATURE: 98.1 F

## 2019-09-24 DIAGNOSIS — R73.03 PREDIABETES: ICD-10-CM

## 2019-09-24 LAB
GLUCOSE BLD STRIP.AUTO-MCNC: 117 MG/DL (ref 65–100)
SERVICE CMNT-IMP: ABNORMAL

## 2019-09-24 PROCEDURE — 76210000046 HC AMBSU PH II REC FIRST 0.5 HR: Performed by: PHYSICAL MEDICINE & REHABILITATION

## 2019-09-24 PROCEDURE — 76000 FLUOROSCOPY <1 HR PHYS/QHP: CPT

## 2019-09-24 PROCEDURE — 77030003666 HC NDL SPINAL BD -A: Performed by: PHYSICAL MEDICINE & REHABILITATION

## 2019-09-24 PROCEDURE — 76030000000 HC AMB SURG OR TIME 0.5 TO 1: Performed by: PHYSICAL MEDICINE & REHABILITATION

## 2019-09-24 PROCEDURE — 74011000250 HC RX REV CODE- 250: Performed by: PHYSICAL MEDICINE & REHABILITATION

## 2019-09-24 PROCEDURE — 72020 X-RAY EXAM OF SPINE 1 VIEW: CPT

## 2019-09-24 PROCEDURE — 82962 GLUCOSE BLOOD TEST: CPT

## 2019-09-24 RX ORDER — BUPIVACAINE HYDROCHLORIDE 5 MG/ML
10 INJECTION, SOLUTION EPIDURAL; INTRACAUDAL ONCE
Status: COMPLETED | OUTPATIENT
Start: 2019-09-24 | End: 2019-09-24

## 2019-09-24 RX ORDER — METHYLPREDNISOLONE ACETATE 40 MG/ML
80 INJECTION, SUSPENSION INTRA-ARTICULAR; INTRALESIONAL; INTRAMUSCULAR; SOFT TISSUE ONCE
Status: DISCONTINUED | OUTPATIENT
Start: 2019-09-24 | End: 2019-09-24 | Stop reason: HOSPADM

## 2019-09-24 RX ORDER — METFORMIN HYDROCHLORIDE 500 MG/1
500 TABLET ORAL 2 TIMES DAILY WITH MEALS
Qty: 20 TAB | Refills: 0 | Status: SHIPPED | OUTPATIENT
Start: 2019-09-24 | End: 2019-10-03 | Stop reason: SDUPTHER

## 2019-09-24 RX ORDER — LIDOCAINE HYDROCHLORIDE 20 MG/ML
5 INJECTION, SOLUTION EPIDURAL; INFILTRATION; INTRACAUDAL; PERINEURAL ONCE
Status: COMPLETED | OUTPATIENT
Start: 2019-09-24 | End: 2019-09-24

## 2019-09-24 NOTE — DISCHARGE INSTRUCTIONS
Radiofrequency Ablation  Discharge Instructions    You had a radiofrequency ablation today. You will probably have some numbness in your lower back area for the next 6-8 hours. You should begin feeling better after a few days, but it may take up to 2 weeks to notice the difference. The benefit you get from your injection will last a variable amount of time, depending on the severity of your spine problem. · Pain:  Most people do not have any increase in pain after this injection. However, you might experience some soreness a few days at the site of the injection. If this happens, putting an ice pack over the sore area will help. · Bandage: You have a small bandage covering the site of the injection. You may remove it when you get home. · Restrictions:  Some one should drive you home after the injection. After that, you have no restrictions. You may resume your normal level of activity. You may take a shower or bath, and you may eat normally. You should continue your current exercise and/or therapy routine. Medications:  Continue your current medications as prescribed. If you pain decreases, you may reduce the amount of your pain medications. If you stopped taking anticoagulants or blood-thinners before the injection, start them tomorrow. Call Dr. Eugenie Marquez at [phone] if you experience:    · Fever (101 degrees Fahrenheit or greater)  · Nausea or vomiting  · Headache unrelieved by your normal pain medication  · Redness or swelling at the injection site that lasts more than 1 day  · New numbness, tingling, weakness, or pain that you didn't have before the injection      If still having pain in 1-2 weeks, call office at [phone] for a follow up appointment.         DISCHARGE SUMMARY from Nurse    The following personal items collected during your admission are returned to you:   Dental Appliance: Dental Appliances: None  Vision: Visual Aid: Glasses  Hearing Aid:    Jewelry:    Clothing: Other Valuables:    Valuables sent to safe: If you were given prescriptions, please review the written information on prescribed medications. · You will receive a Post Operative Call from one of the Recovery Room Nurses on the day after your surgery to check on you. It is very important for us to know how you are recovering after your surgery. · You may receive an e-mail or letter in the mail from [place] regarding your experience with us in the Ambulatory Surgery Unit. Your feedback is valuable to us and we appreciate your participation in the survey. If you have not had your influenza or pneumococcal vaccines, please follow up with your primary care physician. The discharge information has been reviewed with the patient. The patient verbalized understanding.

## 2019-09-24 NOTE — PERIOP NOTES
36 Pt arrived via stretcher into PACU from OR procedure with Dr. Dae Beckman awake and alert and pain free. Received report from RN. 1520 Pt. Alert. Denies pain or chill. Discharge instructions reviewed with caregiver and patient. Allowed and answered any and all questions. Tolerating PO fluids. Patient state ready for discharge.   Confirmed all belongings with patient

## 2019-09-24 NOTE — PERIOP NOTES
Skin assessment:   WNL   Skin color: normal for ethnicity   Skin condition: intact   Skin integrity: WNL   Turgor: normal for age    Neuro:  Push/Pull assessment:     LUE Response: strong    LLE Response: strong push/ pull   RUE Response: strong    RLE Response: strong push/ pull    OB/Gyn assessment:    LMP: n/a    If no menstrual period then reason: post menopausal

## 2019-09-24 NOTE — H&P
Procedural Case Note    9/24/2019    (2:04 PM)    Yumiko [employer]Tunes    1945   (72 y.o.)    [id number]    CC:  pain    ROS:   Complete ROS obtained, no CP, no SOB, no N or V    PMH:     Past Medical History:   Diagnosis Date    ADHD     Adverse effect of anesthesia     hard to put to sleep says patient    Anemia     Arthritis     bilateral shoulders, back.   Sees rheumatology in [facility] (Dr. Alison Nicole)    Chronic pain     shoulders, back    Degenerative disc disease, lumbar     Harris, Dr. Alison Nicole ([place] physician)    Depression     Diabetes [facility])     Pre diabetic: oral medication    Hypercholesterolemia     Hypertension     Memory difficulties     MRSA (methicillin resistant staph aureus) culture positive 06/04/2018    pos for staph, NOT pos for MRSA, pt was treated, neg cx on 08/2018, pt denies hx of MRSA    Sleep apnea     uses CPAP       ALLERGIES:     Allergies   Allergen Reactions    Sulfa (Sulfonamide Antibiotics) Hives    Zoloft [Sertraline] Hives    Beef Containing Products Other (comments)     Break out in bumps      Dicloxacillin Hives and Itching    Pistachio Nut Unknown (comments)     Per pt was from allergy testing, she is not aware of a reaction      Watermelon Unknown (comments)       MEDS:     Current Facility-Administered Medications   Medication Dose Route Frequency    bupivacaine (PF) (MARCAINE) 0.5 % (5 mg/mL) injection 50 mg  10 mL Epidural ONCE    lidocaine (PF) (XYLOCAINE) 20 mg/mL (2 %) injection 100 mg  5 mL Epidural ONCE    methylPREDNISolone acetate (DEPO-MEDROL) 40 mg/mL injection 80 mg  80 mg Epidural ONCE    iohexol (OMNIPAQUE) 180 mg iodine/mL injection 10 mL  10 mL Intra artICUlar ONCE    sodium bicarbonate (4%) (NEUT) injection 1 mL  1 mL SubCUTAneous ONCE          Visit Vitals  /68 (BP 1 Location: Right arm, BP Patient Position: At rest)   Pulse 65   Temp 99 °F (37.2 °C)   Resp 18   Ht 5' 2\" (1.575 m)   Wt 94.3 kg (208 lb)   SpO2 96%   BMI 38.04 kg/m² PE:  Gen: NAD  Head: normocephalic  Heart: RRR  Lungs: CTA haroon  Abd: NT, ND, soft  Neuro: awake and alert  Skin: intact    IMPRESSION:   LS DJD    Note:  The clinical status was discussed in detail with the patient. The procedure was again discussed and described in detail. All understand and accept the planned procedure and risks; reject other forms of treatment. All questions are answered.     Amol Linares MD

## 2019-09-24 NOTE — TELEPHONE ENCOUNTER
Message from Ember Hernandez/refill   Received: Today      Call patient   Message Contents   Lesa Hamilton XYZE Office Pool   Phone Number: [phone]             Pt requesting refill of Metformin 500 mg at [facility], already on file. Please call in a 10 day supply and [facility] will send the rest. Pt is completely out of medication. Requested Prescriptions     Pending Prescriptions Disp Refills    metFORMIN (GLUCOPHAGE) 500 mg tablet 180 Tab 3     Sig: Take 1 Tab by mouth two (2) times daily (with meals).  Indications: prevention of type 2 diabetes mellitus

## 2019-09-24 NOTE — OP NOTES
PROCEDURES PERFORMED:   1. Radiofrequency neuroablation of the medial branches Left L2,  L3, L4 and L5    PREPROCEDURE DIAGNOSIS: lumbar spondylosis with chronic back pain. POST PROCEDURE DIAGNOSIS: lumbar spondylosis with chronic back pain. SURGEON: Eliezer Mcgrath M.D. HISTORY OF PRESENT ILLNESS AND INDICATIONS FOR THE PROCEDURE: This patient was previously given diagnostic successful blocks of the facet joints innervated by the aforementioned medial branches and is here today for ablation and neurolysis of those branches. She has previously failed conservative management which has proceeded to injection therapy. FINDINGS AND PROCEDURES:  Adequate informed consent was obtained and the patient was taken to the procedure room and placed in the prone position on the procedure table. A time out was held for patient verification. The patient had monitoring throughout the procedure at cycled one minute blood pressure, pulse, and pulse oximetry. The patient will be further monitored for 30 minutes postprocedure in the recovery area. The skin was prepped and draped in the normal sterile fashion. Using fluoroscopic guidance in anteroposterior, oblique, and lateral views, the appropriate superior articular processes and pedicles were identified. After identification 1 percent lidocaine skin anesthesia was administered at each site. Using a radiofrequency ablation needle, a 22 gauge 10 millimeter active tip was placed at the medial branch nerves, Left L2, L3, L4 and L5. The initial impedance in Ohms was within acceptable limits. Each level was subsequently tested on sensory at 50 megahertz with no referred pattern of pain noted at any level. Each level was also tested at 2 Hertz with no distal motor movement or referred pain pattern. The lesion site was injected with 0.5 milliliters of 0.5 percent bupivacaine anesthesia of the nerve branch. Next a lesion was applied for 90 seconds at 80 degrees.      The patient tolerated the procedure well with no apparent complications. The patient was taken to the recovery area for further monitoring. Post procedure neurologic examination was consistent with preprocedure examination. The patient was discharged home ambulatory with family and was given post procedure instructions. FOLLOW-UP: The patient will have a follow-up appointment scheduled by the clinic in the coming weeks.

## 2019-09-24 NOTE — PERIOP NOTES
[facility] 1945 
[id number] Situation: 
Verbal report given from: MORGAN Guzman RN Procedure: Procedure(s): 
L3-4, L4-5, L5-S1 RADIO FREQUENCY ABLATION Background: 
 
Preoperative diagnosis: [address] [place] [address] Postoperative diagnosis: [address] [place] [address] :  Dr. Lukas Winn Assistant(s): Circ-1: Marilyn Crisostomo RN Local Nurse Monitor: SindhuZoomSafer Scrub Tech-1: Dewayne Hamilton Specimens: * No specimens in log * Assessment: 
Intra-procedure medications Anesthesia gave intra-procedure sedation and medications, see anesthesia flow sheet Intravenous fluids: Kari Dung Vital signs stable Recommendation: 
 
Permission to share finding with self : yes

## 2019-10-03 ENCOUNTER — OFFICE VISIT (OUTPATIENT)
Dept: FAMILY MEDICINE CLINIC | Age: 74
End: 2019-10-03

## 2019-10-03 ENCOUNTER — HOSPITAL ENCOUNTER (OUTPATIENT)
Dept: LAB | Age: 74
Discharge: HOME | End: 2019-10-03
Payer: MEDICARE

## 2019-10-03 VITALS
WEIGHT: 208 LBS | BODY MASS INDEX: 38.28 KG/M2 | RESPIRATION RATE: 16 BRPM | SYSTOLIC BLOOD PRESSURE: 120 MMHG | TEMPERATURE: 98.2 F | DIASTOLIC BLOOD PRESSURE: 67 MMHG | HEART RATE: 64 BPM | OXYGEN SATURATION: 94 % | HEIGHT: 62 IN

## 2019-10-03 DIAGNOSIS — Z13.31 SCREENING FOR DEPRESSION: ICD-10-CM

## 2019-10-03 DIAGNOSIS — R73.03 PREDIABETES: ICD-10-CM

## 2019-10-03 DIAGNOSIS — E55.9 VITAMIN D DEFICIENCY: ICD-10-CM

## 2019-10-03 DIAGNOSIS — Z13.29 SCREENING FOR THYROID DISORDER: ICD-10-CM

## 2019-10-03 DIAGNOSIS — Z23 ENCOUNTER FOR IMMUNIZATION: ICD-10-CM

## 2019-10-03 DIAGNOSIS — E11.9 CONTROLLED TYPE 2 DIABETES MELLITUS WITHOUT COMPLICATION, WITHOUT LONG-TERM CURRENT USE OF INSULIN (HCC): ICD-10-CM

## 2019-10-03 DIAGNOSIS — Z00.00 MEDICARE ANNUAL WELLNESS VISIT, SUBSEQUENT: Primary | ICD-10-CM

## 2019-10-03 DIAGNOSIS — I10 ESSENTIAL HYPERTENSION: ICD-10-CM

## 2019-10-03 DIAGNOSIS — E66.01 OBESITY, MORBID (HCC): ICD-10-CM

## 2019-10-03 DIAGNOSIS — E78.00 HYPERCHOLESTEROLEMIA: ICD-10-CM

## 2019-10-03 DIAGNOSIS — Z12.11 SPECIAL SCREENING FOR MALIGNANT NEOPLASMS, COLON: ICD-10-CM

## 2019-10-03 DIAGNOSIS — Z13.39 SCREENING FOR ALCOHOLISM: ICD-10-CM

## 2019-10-03 LAB
ALBUMIN UR QL STRIP: 10 MG/L
CREATININE, URINE POC: 200 MG/DL
MICROALBUMIN/CREAT RATIO POC: <30 MG/G

## 2019-10-03 PROCEDURE — 85025 COMPLETE CBC W/AUTO DIFF WBC: CPT

## 2019-10-03 PROCEDURE — 80053 COMPREHEN METABOLIC PANEL: CPT

## 2019-10-03 PROCEDURE — 83036 HEMOGLOBIN GLYCOSYLATED A1C: CPT

## 2019-10-03 PROCEDURE — 82306 VITAMIN D 25 HYDROXY: CPT

## 2019-10-03 PROCEDURE — 80061 LIPID PANEL: CPT

## 2019-10-03 PROCEDURE — 84443 ASSAY THYROID STIM HORMONE: CPT

## 2019-10-03 PROCEDURE — 36415 COLL VENOUS BLD VENIPUNCTURE: CPT

## 2019-10-03 RX ORDER — LANCETS
EACH MISCELLANEOUS
Qty: 100 EACH | Refills: 11 | Status: SHIPPED | OUTPATIENT
Start: 2019-10-03

## 2019-10-03 RX ORDER — INSULIN PUMP SYRINGE, 3 ML
EACH MISCELLANEOUS
Qty: 1 KIT | Refills: 0 | Status: SHIPPED | OUTPATIENT
Start: 2019-10-03

## 2019-10-03 RX ORDER — METFORMIN HYDROCHLORIDE 500 MG/1
500 TABLET ORAL 2 TIMES DAILY WITH MEALS
Qty: 180 TAB | Refills: 3 | Status: SHIPPED | OUTPATIENT
Start: 2019-10-03 | End: 2021-01-07 | Stop reason: ALTCHOICE

## 2019-10-03 NOTE — PATIENT INSTRUCTIONS
Vaccine Information Statement Influenza (Flu) Vaccine (Inactivated or Recombinant): What You Need to Know Many Vaccine Information Statements are available in [place] and other languages. See www.immunize.org/vis Hojas de información sobre vacunas están disponibles en español y en muchos otros idiomas. Visite www.immunize.org/vis 1. Why get vaccinated? Influenza vaccine can prevent influenza (flu). Flu is a contagious disease that spreads around the [facility] every year, usually between October and May. Anyone can get the flu, but it is more dangerous for some people. Infants and young children, people 72years of age and older, pregnant women, and people with certain health conditions or a weakened immune system are at greatest risk of flu complications. Pneumonia, bronchitis, sinus infections and ear infections are examples of flu-related complications. If you have a medical condition, such as heart disease, cancer or diabetes, flu can make it worse. Flu can cause fever and chills, sore throat, muscle aches, fatigue, cough, headache, and runny or stuffy nose. Some people may have vomiting and diarrhea, though this is more common in children than adults. Each year thousands of people in the [facility] die from flu, and many more are hospitalized. Flu vaccine prevents millions of illnesses and flu-related visits to the doctor each year. 2. Influenza vaccines CDC recommends everyone 10months of age and older get vaccinated every flu season. Children 6 months through 6years of age may need 2 doses during a single flu season. Everyone else needs only 1 dose each flu season. It takes about 2 weeks for protection to develop after vaccination. There are many flu viruses, and they are always changing. Each year a new flu vaccine is made to protect against three or four viruses that are likely to cause disease in the upcoming flu season.  Even when the vaccine doesnt exactly match these viruses, it may still provide some protection. Influenza vaccine does not cause flu. Influenza vaccine may be given at the same time as other vaccines. 3. Talk with your health care provider Tell your vaccine provider if the person getting the vaccine: 
 Has had an allergic reaction after a previous dose of influenza vaccine, or has any severe, life-threatening allergies.  Has ever had Guillain-Barré Syndrome (also called GBS). In some cases, your health care provider may decide to postpone influenza vaccination to a future visit. People with minor illnesses, such as a cold, may be vaccinated. People who are moderately or severely ill should usually wait until they recover before getting influenza vaccine. Your health care provider can give you more information. 4. Risks of a reaction  Soreness, redness, and swelling where shot is given, fever, muscle aches, and headache can happen after influenza vaccine.  There may be a very small increased risk of Guillain-Barré Syndrome (GBS) after inactivated influenza vaccine (the flu shot). [facility] children who get the flu shot along with pneumococcal vaccine (PCV13), and/or DTaP vaccine at the same time might be slightly more likely to have a seizure caused by fever. Tell your health care provider if a child who is getting flu vaccine has ever had a seizure. People sometimes faint after medical procedures, including vaccination. Tell your provider if you feel dizzy or have vision changes or ringing in the ears. As with any medicine, there is a very remote chance of a vaccine causing a severe allergic reaction, other serious injury, or death. 5. What if there is a serious problem? An allergic reaction could occur after the vaccinated person leaves the clinic.  If you see signs of a severe allergic reaction (hives, swelling of the face and throat, difficulty breathing, a fast heartbeat, dizziness, or weakness), call 9-1-1 and get the person to the nearest hospital. 
 
For other signs that concern you, call your health care provider. Adverse reactions should be reported to the Vaccine Adverse Event Reporting System (VAERS). Your health care provider will usually file this report, or you can do it yourself. Visit the VAERS website at www.vaers. hhs.gov or call 1-872.765.6306. VAERS is only for reporting reactions, and VAERS staff do not give medical advice. 6. The National Vaccine Injury Compensation Program 
 
The [facility] Vaccine Injury Compensation Program (VICP) is a federal program that was created to compensate people who may have been injured by certain vaccines. Visit the VICP website at www.[facility].gov/vaccinecompensation or call 5-638.924.4036 to learn about the program and about filing a claim. There is a time limit to file a claim for compensation. 7. How can I learn more?  Ask your health care provider.  Call your local or state health department.  Contact the Centers for Disease Control and Prevention (CDC): 
- Call 0-238.326.1177 (9-934-YDG-INFO) or 
- Visit CDCs influenza website at www.cdc.gov/flu Vaccine Information Statement (Interim) Inactivated Influenza Vaccine 8/15/2019 
42 DIEGO Turner 248RN-56 Department of [employer] Centers for Disease Control and Prevention Office Use Only Medicare Wellness Visit, Female The best way to live healthy is to have a lifestyle where you eat a well-balanced diet, exercise regularly, limit alcohol use, and quit all forms of tobacco/nicotine, if applicable. Regular preventive services are another way to keep healthy. Preventive services (vaccines, screening tests, monitoring & exams) can help personalize your care plan, which helps you manage your own care. Screening tests can find health problems at the earliest stages, when they are easiest to treat. [facility] follows the current, evidence-based guidelines published by the [facility] Noemi ([facility]) when recommending preventive services for our patients. Because we follow these guidelines, sometimes recommendations change over time as research supports it. (For example, mammograms used to be recommended annually. Even though Medicare will still pay for an annual mammogram, the newer guidelines recommend a mammogram every two years for women of average risk.) Of course, you and your doctor may decide to screen more often for some diseases, based on your risk and your health status. Preventive services for you include: - Medicare offers their members a free annual wellness visit, which is time for you and your primary care provider to discuss and plan for your preventive service needs. Take advantage of this benefit every year! 
-All adults over the age of 72 should receive the recommended pneumonia vaccines. Current USPSTF guidelines recommend a series of two vaccines for the best pneumonia protection.  
-All adults should have a flu vaccine yearly and a tetanus vaccine every 10 years. All adults age 61 and older should receive a shingles vaccine once in their lifetime.   
-A bone mass density test is recommended when a woman turns 65 to screen for osteoporosis. This test is only recommended one time, as a screening. Some providers will use this same test as a disease monitoring tool if you already have osteoporosis. -All adults age 38-68 who are overweight should have a diabetes screening test once every three years.  
-Other screening tests and preventive services for persons with diabetes include: an eye exam to screen for diabetic retinopathy, a kidney function test, a foot exam, and stricter control over your cholesterol.  
-Cardiovascular screening for adults with routine risk involves an electrocardiogram (ECG) at intervals determined by your doctor. -Colorectal cancer screenings should be done for adults age 54-65 with no increased risk factors for colorectal cancer. There are a number of acceptable methods of screening for this type of cancer. Each test has its own benefits and drawbacks. Discuss with your doctor what is most appropriate for you during your annual wellness visit. The different tests include: colonoscopy (considered the best screening method), a fecal occult blood test, a fecal DNA test, and sigmoidoscopy. -Breast cancer screenings are recommended every other year for women of normal risk, age 54-69. 
-Cervical cancer screenings for women over age 72 are only recommended with certain risk factors.  
-All adults born between [facility] should be screened once for Hepatitis C. Here is a list of your current Health Maintenance items (your personalized list of preventive services) with a due date: 
Health Maintenance Due Topic Date Due  
 Diabetic Foot Care  11/11/1955  Albumin Urine Test  11/11/1957 [facility] Eye Exam  11/11/1957  Shingles Vaccine (1 of 2) 11/11/1997  Glaucoma Screening   03/20/2018  Stool testing for trace blood  06/06/2018 [facility] Annual Well Visit  03/01/2019  Hemoglobin A1C    05/27/2019  Flu Vaccine  08/01/2019 Learning About Diabetes Food Guidelines Your Care Instructions Meal planning is important to manage diabetes. It helps keep your blood sugar at a target level (which you set with your doctor). You don't have to eat special foods. You can eat what your family eats, including sweets once in a while. But you do have to pay attention to how often you eat and how much you eat of certain foods. You may want to work with a dietitian or a certified diabetes educator (CDE) to help you plan meals and snacks. A dietitian or CDE can also help you lose weight if that is one of your goals. What should you know about eating carbs? Managing the amount of carbohydrate (carbs) you eat is an important part of healthy meals when you have diabetes. Carbohydrate is found in many foods. · Learn which foods have carbs. And learn the amounts of carbs in different foods. ? Bread, cereal, pasta, and rice have about 15 grams of carbs in a serving. A serving is 1 slice of bread (1 ounce), ½ cup of cooked cereal, or 1/3 cup of cooked pasta or rice. ? Fruits have 15 grams of carbs in a serving. A serving is 1 small fresh fruit, such as an apple or orange; ½ of a banana; ½ cup of cooked or canned fruit; ½ cup of fruit juice; 1 cup of melon or raspberries; or 2 tablespoons of dried fruit. ? Milk and no-sugar-added yogurt have 15 grams of carbs in a serving. A serving is 1 cup of milk or 2/3 cup of no-sugar-added yogurt. ? Starchy vegetables have 15 grams of carbs in a serving. A serving is ½ cup of mashed potatoes or sweet potato; 1 cup winter squash; ½ of a small baked potato; ½ cup of cooked beans; or ½ cup cooked corn or green peas. · Learn how much carbs to eat each day and at each meal. A dietitian or CDE can teach you how to keep track of the amount of carbs you eat. This is called carbohydrate counting. · If you are not sure how to count carbohydrate grams, use the Plate Method to plan meals. It is a good, quick way to make sure that you have a balanced meal. It also helps you spread carbs throughout the day. ? Divide your plate by types of foods. Put non-starchy vegetables on half the plate, meat or other protein food on one-quarter of the plate, and a grain or starchy vegetable in the final quarter of the plate. To this you can add a small piece of fruit and 1 cup of milk or yogurt, depending on how many carbs you are supposed to eat at a meal. 
· Try to eat about the same amount of carbs at each meal. Do not \"save up\" your daily allowance of carbs to eat at one meal. 
· Proteins have very little or no carbs per serving.  Examples of proteins are beef, chicken, turkey, fish, eggs, tofu, cheese, cottage cheese, and peanut butter. A serving size of meat is 3 ounces, which is about the size of a deck of cards. Examples of meat substitute serving sizes (equal to 1 ounce of meat) are 1/4 cup of cottage cheese, 1 egg, 1 tablespoon of peanut butter, and ½ cup of tofu. How can you eat out and still eat healthy? · Learn to estimate the serving sizes of foods that have carbohydrate. If you measure food at home, it will be easier to estimate the amount in a serving of restaurant food. · If the meal you order has too much carbohydrate (such as potatoes, corn, or baked beans), ask to have a low-carbohydrate food instead. Ask for a salad or green vegetables. · If you use insulin, check your blood sugar before and after eating out to help you plan how much to eat in the future. · If you eat more carbohydrate at a meal than you had planned, take a walk or do other exercise. This will help lower your blood sugar. What else should you know? · Limit saturated fat, such as the fat from meat and dairy products. This is a healthy choice because people who have diabetes are at higher risk of heart disease. So choose lean cuts of meat and nonfat or low-fat dairy products. Use olive or canola oil instead of butter or shortening when cooking. · Don't skip meals. Your blood sugar may drop too low if you skip meals and take insulin or certain medicines for diabetes. · Check with your doctor before you drink alcohol. Alcohol can cause your blood sugar to drop too low. Alcohol can also cause a bad reaction if you take certain diabetes medicines. Follow-up care is a key part of your treatment and safety. Be sure to make and go to all appointments, and call your doctor if you are having problems. It's also a good idea to know your test results and keep a list of the medicines you take. Where can you learn more? Go to http://breann-johnnie.info/. Enter Q875 in the search box to learn more about \"Learning About Diabetes Food Guidelines. \" Current as of: April 16, 2019 Content Version: 12.2 © 6205-9591 [employer]. Care instructions adapted under license by [employer] (which disclaims liability or warranty for this information). If you have questions about a medical condition or this instruction, always ask your healthcare professional. [place] [address] any warranty or liability for your use of this information.

## 2019-10-03 NOTE — PROGRESS NOTES
Chief Complaint   Patient presents with   Ryder Black Annual Wellness Visit     Medicare       1. Have you been to the ER, urgent care clinic since your last visit? Hospitalized since your last visit? No    2. Have you seen or consulted any other health care providers outside of the [address] [place] [address] since your last visit? Include any pap smears or colon screening. No    Health maintenance reviewed. Pt informed of health maintenance past due and/or upcoming. Pt verbalized understanding. Pt is fasting this visit. Pt would like flu vaccine this visit. Tha Mccullough is a 72 y.o. female who presents for routine immunizations. She denies any symptoms , reactions or allergies that would exclude them from being immunized today. Risks and adverse reactions were discussed and the VIS was given to them. All questions were addressed. She was observed for 5 min post injection. There were no reactions observed.     Chin Lorenzo LPN

## 2019-10-03 NOTE — PROGRESS NOTES
Chief Complaint   Patient presents with   Bryan Matson Annual Wellness Visit     Medicare    Diabetes     f/u       This is the Subsequent Medicare Annual Wellness Exam, performed 12 months or more after the Initial AWV or the last Subsequent AWV    I have reviewed the patient's medical history in detail and updated the computerized patient record. History     Past Medical History:   Diagnosis Date    ADHD     Adverse effect of anesthesia     hard to put to sleep says patient    Anemia     Arthritis     bilateral shoulders, back. Sees rheumatology in [place] (Dr. Ole Choi)    Chronic pain     shoulders, back    Degenerative disc disease, lumbar     [place], Dr. Ole Choi (RA physician)    Depression     Diabetes [facility])     Pre diabetic: oral medication    Hypercholesterolemia     Hypertension     Memory difficulties     MRSA (methicillin resistant staph aureus) culture positive 06/04/2018    pos for staph, NOT pos for MRSA, pt was treated, neg cx on 08/2018, pt denies hx of MRSA    Sleep apnea     uses CPAP      Past Surgical History:   Procedure Laterality Date    HX APPENDECTOMY  1999    HX CARPAL TUNNEL RELEASE Bilateral     HX GYN  2008    hysterectomy    HX HIP REPLACEMENT Bilateral     HX HYSTEROSCOPY      HX KNEE REPLACEMENT Bilateral 2002    HX ORTHOPAEDIC  2000    right Hip replaced    HX ORTHOPAEDIC  2004    left hip replaced    HX ORTHOPAEDIC  2014    right hip revision    HX ORTHOPAEDIC  2003, 2004    bilateral CTR     Current Outpatient Medications   Medication Sig Dispense Refill    metFORMIN (GLUCOPHAGE) 500 mg tablet Take 1 Tab by mouth two (2) times daily (with meals). Indications: prevention of type 2 diabetes mellitus 20 Tab 0    metoprolol succinate (TOPROL XL) 50 mg XL tablet TAKE 1 TABLET DAILY 90 Tab 4    simvastatin (ZOCOR) 20 mg tablet TAKE 1 TABLET NIGHTLY 90 Tab 3    melatonin 3 mg tablet Take 3 mg by mouth nightly as needed.       meloxicam (MOBIC) 15 mg tablet Take 15 mg by mouth daily.  nystatin-triamcinolone (MYCOLOG) 100,000-0.1 unit/gram-% ointment Apply  to affected area daily. 15 g 0    cholecalciferol (VITAMIN D3) 1,000 unit cap Take 1,000 Units by mouth daily.  OTHER,NON-FORMULARY, Omega XL fatty acid 1200mg daily      DULoxetine (CYMBALTA) 30 mg capsule Take 1 cap by mouth every evening. (Patient taking differently: Take 30 mg by mouth every evening. Take 1 cap by mouth every evening.) 90 Cap 1    DULoxetine (CYMBALTA) 60 mg capsule Take 1 cap by mouth every evening. (Patient taking differently: Take 60 mg by mouth daily. Indications: CHRONIC MUSCULOSKELETAL PAIN) 90 Cap 1    gabapentin (NEURONTIN) 300 mg capsule TAKE 1 CAPSULE BY MOUTH 5 TIMES DAILY (Patient taking differently: Indications: pt reports taking 3 tab three times daily) 420 Cap 0    VOLTAREN 1 % gel Apply 2 g to affected area nightly. Bilateral shoulders      lisdexamfetamine (VYVANSE) 10 mg capsule Take 1 Cap by mouth daily. Max Daily Amount: 10 mg. 30 Cap 0    cyclobenzaprine (FLEXERIL) 10 mg tablet Take 10 mg by mouth three (3) times daily as needed for Muscle Spasm(s).  TURMERIC, BULK, by Does Not Apply route. 1000mg daily       Allergies   Allergen Reactions    Sulfa (Sulfonamide Antibiotics) Hives    Zoloft [Sertraline] Hives    Beef Containing Products Other (comments)     Break out in bumps      Dicloxacillin Hives and Itching    Pistachio Nut Unknown (comments)     Per pt was from allergy testing, she is not aware of a reaction      Watermelon Unknown (comments)     No family history on file.   Social History     Tobacco Use    Smoking status: Never Smoker    Smokeless tobacco: Never Used   Substance Use Topics    Alcohol use: No     Patient Active Problem List   Diagnosis Code    Hyperlipidemia E78.5    Pre-diabetes R73.03    Hypertension I10    Depression F32.9    Arthritis M19.90    Degenerative disc disease, lumbar M51.36    Hypercholesterolemia E78.00    Obesity, morbid ([facility] [address] E66.01    Primary osteoarthritis of right shoulder M19.011    Attention deficit disorder (ADD) in adult F98.8       Depression Risk Factor Screening:     3 most recent PHQ Screens 10/3/2019   Little interest or pleasure in doing things Not at all   Feeling down, depressed, irritable, or hopeless Not at all   Total Score PHQ 2 0     Alcohol Risk Factor Screening: You do not drink alcohol or very rarely. Functional Ability and Level of Safety:   Hearing Loss  Hearing is good. Activities of Daily Living  The home contains: no safety equipment. Patient does total self care    Fall Risk  Fall Risk Assessment, last 12 mths 10/3/2019   Able to walk? Yes   Fall in past 12 months? No   Fall with injury? -   Number of falls in past 12 months -   Fall Risk Score -       Abuse Screen  Patient is not abused    Cognitive Screening   Evaluation of Cognitive Function:  Has your family/caregiver stated any concerns about your memory: no  Normal    Patient Care Team   Patient Care Team:  Fredi Barba NP as PCP - General (Pediatrics)    Assessment/Plan   Education and counseling provided:  Are appropriate based on today's review and evaluation  Pneumococcal Vaccine  Influenza Vaccine  Screening Mammography  Colorectal cancer screening tests  Bone mass measurement (DEXA)  Screening for glaucoma  Medical nutrition therapy for individuals with diabetes or renal disease    Diagnoses and all orders for this visit:    1. Medicare annual wellness visit, subsequent    2. Encounter for immunization  -     INFLUENZA VACCINE INACTIVATED (IIV), SUBUNIT, ADJUVANTED, IM  -     ADMIN INFLUENZA VIRUS VAC    3. Controlled type 2 diabetes mellitus without complication, without long-term current use of insulin (HCC)  -     METABOLIC PANEL, COMPREHENSIVE  -     CBC WITH AUTOMATED DIFF  -     AMB POC URINE, MICROALBUMIN, SEMIQUANT (3 RESULTS)  -     HEMOGLOBIN A1C WITH EAG    4.  Hypercholesterolemia  - LIPID PANEL    5. Obesity, morbid ([facility] [address]    6. Essential hypertension  -     METABOLIC PANEL, COMPREHENSIVE    7. Screening for thyroid disorder  -     TSH 3RD GENERATION    8. Vitamin D deficiency  -     VITAMIN D, 25 HYDROXY    9. Special screening for malignant neoplasms, colon  -     OCCULT BLOOD IMMUNOASSAY,DIAGNOSTIC; Future    10. Screening for alcoholism  -     ND ANNUAL ALCOHOL SCREEN 15 MIN    11. Screening for depression  -     [facility]   Topic Date Due    FOOT EXAM Q1  11/11/1955    MICROALBUMIN Q1  11/11/1957    EYE EXAM RETINAL OR DILATED  11/11/1957    Shingrix Vaccine Age 50> (1 of 2) 11/11/1997    GLAUCOMA SCREENING Q2Y  03/20/2018    FOBT Q 1 YEAR, 18+  06/06/2018    MEDICARE YEARLY EXAM  03/01/2019    HEMOGLOBIN A1C Q6M  05/27/2019    Influenza Age 5 to Adult  08/01/2019         S: Catherine Crowder is a 72 y.o. yo female who presents for DM follow up. Last DM check hemoglobin A1c on 11/27/18 was 6.8    Blood sugars- checking occasionally, usually ranges 130-150  Last [facility] exam- [facility]  Foot exam- no wounds, no tingling. Was seen by podiatry for right arch   Diet and Exercise- tries to walk daily, watches diet. Dr Danyell Loera following for back pain. Says that she recently had some \"nerves cauterized\" in her back which has helped a lot. Says she takes gabapentin 900 mg TID, which was prescribed by Dr Carrie Disla (spinal ortho) whom she says she does not see anymore and was told that her PCP would need to take over. RA and chronic back pain:  She is also followed by Rheumatology Dr Yolande Aguiar in [place] for arthritis. She says she is seeing them every 6 months. She also takes Cymbalta 60mg in AM and 30 mg in PM and mobic daily. Health Maintenance Reviewed    Denies cardiac complaints including chest pain or discomfort, elevated heart rate, or palpitations.  Denies any headache, vision changes, numbness and tingling or weakness in her extremities. Denies respiratory complaints including SOB, difficulty or pain with breathing, wheezes, and cough. Feels well and ROS is otherwise negative. Past Medical History:   Diagnosis Date    ADHD     Adverse effect of anesthesia     hard to put to sleep says patient    Anemia     Arthritis     bilateral shoulders, back.   Sees rheumatology in [place] (Dr. Kerry Caceres)    Chronic pain     shoulders, back    Degenerative disc disease, lumbar     [place], Dr. Kerry Caceres (RA physician)    Depression     Diabetes [facility])     Pre diabetic: oral medication    Hypercholesterolemia     Hypertension     Memory difficulties     MRSA (methicillin resistant staph aureus) culture positive 06/04/2018    pos for staph, NOT pos for MRSA, pt was treated, neg cx on 08/2018, pt denies hx of MRSA    Sleep apnea     uses CPAP      Past Surgical History:   Procedure Laterality Date    HX APPENDECTOMY  1999    HX CARPAL TUNNEL RELEASE Bilateral     HX GYN  2008    hysterectomy    HX HIP REPLACEMENT Bilateral     HX HYSTEROSCOPY      HX KNEE REPLACEMENT Bilateral 2002    HX ORTHOPAEDIC  2000    right Hip replaced    HX ORTHOPAEDIC  2004    left hip replaced    HX ORTHOPAEDIC  2014    right hip revision    HX ORTHOPAEDIC  2003, 2004    bilateral CTR     Social History     Socioeconomic History    Marital status:      Spouse name: Not on file    Number of children: Not on file    Years of education: Not on file    Highest education level: Not on file   Occupational History    Not on file   Social Needs    Financial resource strain: Not on file    Food insecurity:     Worry: Not on file     Inability: Not on file    Transportation needs:     Medical: Not on file     Non-medical: Not on file   Tobacco Use    Smoking status: Never Smoker    Smokeless tobacco: Never Used   Substance and Sexual Activity    Alcohol use: No    Drug use: No    Sexual activity: Not on file   Lifestyle    Physical activity:     Days per week: Not on file     Minutes per session: Not on file    Stress: Not on file   Relationships    Social connections:     Talks on phone: Not on file     Gets together: Not on file     Attends Judaism service: Not on file     Active member of club or organization: Not on file     Attends meetings of clubs or organizations: Not on file     Relationship status: Not on file    Intimate partner violence:     Fear of current or ex partner: Not on file     Emotionally abused: Not on file     Physically abused: Not on file     Forced sexual activity: Not on file   Other Topics Concern    Not on file   Social History Narrative    Not on file     Current Outpatient Medications   Medication Sig Dispense Refill    metFORMIN (GLUCOPHAGE) 500 mg tablet Take 1 Tab by mouth two (2) times daily (with meals). Indications: prevention of type 2 diabetes mellitus 20 Tab 0    metoprolol succinate (TOPROL XL) 50 mg XL tablet TAKE 1 TABLET DAILY 90 Tab 4    simvastatin (ZOCOR) 20 mg tablet TAKE 1 TABLET NIGHTLY 90 Tab 3    melatonin 3 mg tablet Take 3 mg by mouth nightly as needed.  meloxicam (MOBIC) 15 mg tablet Take 15 mg by mouth daily.  nystatin-triamcinolone (MYCOLOG) 100,000-0.1 unit/gram-% ointment Apply  to affected area daily. 15 g 0    cholecalciferol (VITAMIN D3) 1,000 unit cap Take 1,000 Units by mouth daily.  OTHER,NON-FORMULARY, Omega XL fatty acid 1200mg daily      DULoxetine (CYMBALTA) 30 mg capsule Take 1 cap by mouth every evening. (Patient taking differently: Take 30 mg by mouth every evening. Take 1 cap by mouth every evening.) 90 Cap 1    DULoxetine (CYMBALTA) 60 mg capsule Take 1 cap by mouth every evening. (Patient taking differently: Take 60 mg by mouth daily.  Indications: CHRONIC MUSCULOSKELETAL PAIN) 90 Cap 1    gabapentin (NEURONTIN) 300 mg capsule TAKE 1 CAPSULE BY MOUTH 5 TIMES DAILY (Patient taking differently: Indications: pt reports taking 3 tab three times daily) 420 Cap 0    VOLTAREN 1 % gel Apply 2 g to affected area nightly. Bilateral shoulders      lisdexamfetamine (VYVANSE) 10 mg capsule Take 1 Cap by mouth daily. Max Daily Amount: 10 mg. 30 Cap 0    cyclobenzaprine (FLEXERIL) 10 mg tablet Take 10 mg by mouth three (3) times daily as needed for Muscle Spasm(s).  TURMERIC, BULK, by Does Not Apply route. 1000mg daily         Pt is taking all medications as prescribed without any side effects or difficulty. Allergies   Allergen Reactions    Sulfa (Sulfonamide Antibiotics) Hives    Zoloft [Sertraline] Hives    Beef Containing Products Other (comments)     Break out in bumps      Dicloxacillin Hives and Itching    Pistachio Nut Unknown (comments)     Per pt was from allergy testing, she is not aware of a reaction      Watermelon Unknown (comments)         Agree with nurses note. O:   Visit Vitals  /67 (BP 1 Location: Left arm, BP Patient Position: Sitting)   Pulse 64   Temp 98.2 °F (36.8 °C) (Oral)   Resp 16   Ht 5' 2\" (1.575 m)   Wt 208 lb (94.3 kg)   LMP  (LMP Unknown)   SpO2 94%   BMI 38.04 kg/m²      PAIN: No complaints of pain today. GENERAL: Isabel Sandoval  is sitting in the chair in NAD.   EYE: PERRLA. EOMs intact. Sclera anicteric without injection. RESP: Unlabored without SOB. Speaking in full sentences. Breath sounds are symmetrical bilaterally. Clear to auscultation to all fields. No wheezes. No rales or rhonchi. CV: normal rate. Regular rhythm. S1, S2 audible. No murmur noted. No rubs, clicks or gallops noted. NEURO:  awake, alert and oriented to person, place, and time and event. Clear speech. Muscle strength is +5/5 x 4 extremities. Steady gait. HEME/LYMPH: peripheral pulses palpable 2+ x 4 extremities. No peripheral edema is noted. FEET: Intact no wounds or abrasions. Denies numbness or tingling.  Sensation intact         Diabetic foot exam performed by Leighton Coronel NP     Measurement  Response Nurse Comment Physician Comment   Monofilament  R - normal sensation with micro filament  L - normal sensation with micro filament     Pulse DP R - 2+ (normal)  L - 2+ (normal)     Pulse TP R - 2+ (normal)  L - 2+ (normal)     Structural deformity R - None  L - None     Skin Integrity / Deformity R - None  L - None                   Results for orders placed or performed during the hospital encounter of 09/24/19   GLUCOSE, POC   Result Value Ref Range    Glucose (POC) 117 (H) 65 - 100 mg/dL    Performed by Li Biggs          A/RUDY:  Differential diagnosis and treatment options reviewed with patient who is in agreement with treatment plan as outlined below. ICD-10-CM ICD-9-CM    1. Medicare annual wellness visit, subsequent Z00.00 V70.0    2. Encounter for immunization Z23 V03.89 INFLUENZA VACCINE INACTIVATED (IIV), SUBUNIT, ADJUVANTED, IM      ADMIN INFLUENZA VIRUS VAC   3. Controlled type 2 diabetes mellitus without complication, without long-term current use of insulin (HCC) H14.2 410.77 METABOLIC PANEL, COMPREHENSIVE      CBC WITH AUTOMATED DIFF      AMB POC URINE, MICROALBUMIN, SEMIQUANT (3 RESULTS)      HEMOGLOBIN A1C WITH EAG   4. Hypercholesterolemia E78.00 272.0 LIPID PANEL   5. Obesity, morbid ([facility] [address] E66.01 278.01    6. Essential hypertension U73 582.5 METABOLIC PANEL, COMPREHENSIVE   7. Screening for thyroid disorder Z13.29 V77.0 TSH 3RD GENERATION   8. Vitamin D deficiency E55.9 268.9 VITAMIN D, 25 HYDROXY   9. Special screening for malignant neoplasms, colon Z12.11 V76.51 OCCULT BLOOD IMMUNOASSAY,DIAGNOSTIC   10. Screening for alcoholism Z13.39 V79.1 [place] ANNUAL ALCOHOL SCREEN 15 MIN   11. Screening for depression Z13.31 V79.0 DEPRESSION SCREEN ANNUAL     BP at goal.  No change in therapy. DM is stable on current therapy  Discussed BMI and healthy weight. Encouraged patient to work to implement changes including diet high in raw fruits and vegetables, lean protein and good fats.  Limit refined, processed carbohydrates and sugar. Encouraged regular exercise. Advised of frequent feet checks  Advised yearly eye exam  Reviewed warning signs of diabetic emergency, hypertension, stroke and heart attack      She would like for me to take over prescribing her gabapentin. I told her that she should discuss this with her pain management or RA doctor about this. I do not feel that the current dose that she is on is safe (she is taking 2700mg Gabapentin per day). I advised her to call Dr Sarika Zarco and discuss this. Verbal and written instructions (see AVS) provided. Patient expresses understanding and agreement of diagnosis and treatment plan.

## 2019-10-04 LAB
25(OH)D3+25(OH)D2 SERPL-MCNC: 36.7 NG/ML (ref 30–100)
ALBUMIN SERPL-MCNC: 4.5 G/DL (ref 3.5–4.8)
ALBUMIN/GLOB SERPL: 2.1 {RATIO} (ref 1.2–2.2)
ALP SERPL-CCNC: 64 IU/L (ref 39–117)
ALT SERPL-CCNC: 21 IU/L (ref 0–32)
AST SERPL-CCNC: 23 IU/L (ref 0–40)
BASOPHILS # BLD AUTO: 0 X10E3/UL (ref 0–0.2)
BASOPHILS NFR BLD AUTO: 1 %
BILIRUB SERPL-MCNC: 0.4 MG/DL (ref 0–1.2)
BUN SERPL-MCNC: 15 MG/DL (ref 8–27)
BUN/CREAT SERPL: 21 (ref 12–28)
CALCIUM SERPL-MCNC: 9.2 MG/DL (ref 8.7–10.3)
CHLORIDE SERPL-SCNC: 98 MMOL/L (ref 96–106)
CHOLEST SERPL-MCNC: 128 MG/DL (ref 100–199)
CO2 SERPL-SCNC: 25 MMOL/L (ref 20–29)
CREAT SERPL-MCNC: 0.73 MG/DL (ref 0.57–1)
EOSINOPHIL # BLD AUTO: 0.1 X10E3/UL (ref 0–0.4)
EOSINOPHIL NFR BLD AUTO: 1 %
ERYTHROCYTE [DISTWIDTH] IN BLOOD BY AUTOMATED COUNT: 12.4 % (ref 12.3–15.4)
EST. AVERAGE GLUCOSE BLD GHB EST-MCNC: 154 MG/DL
GLOBULIN SER CALC-MCNC: 2.1 G/DL (ref 1.5–4.5)
GLUCOSE SERPL-MCNC: 114 MG/DL (ref 65–99)
HBA1C MFR BLD: 7 % (ref 4.8–5.6)
HCT VFR BLD AUTO: 41.2 % (ref 34–46.6)
HDLC SERPL-MCNC: 45 MG/DL
HGB BLD-MCNC: 13.2 G/DL (ref 11.1–15.9)
IMM GRANULOCYTES # BLD AUTO: 0 X10E3/UL (ref 0–0.1)
IMM GRANULOCYTES NFR BLD AUTO: 0 %
INTERPRETATION, 910389: NORMAL
LDLC SERPL CALC-MCNC: 61 MG/DL (ref 0–99)
LYMPHOCYTES # BLD AUTO: 2.5 X10E3/UL (ref 0.7–3.1)
LYMPHOCYTES NFR BLD AUTO: 36 %
Lab: NORMAL
MCH RBC QN AUTO: 28.8 PG (ref 26.6–33)
MCHC RBC AUTO-ENTMCNC: 32 G/DL (ref 31.5–35.7)
MCV RBC AUTO: 90 FL (ref 79–97)
MONOCYTES # BLD AUTO: 0.6 X10E3/UL (ref 0.1–0.9)
MONOCYTES NFR BLD AUTO: 8 %
NEUTROPHILS # BLD AUTO: 3.7 X10E3/UL (ref 1.4–7)
NEUTROPHILS NFR BLD AUTO: 54 %
PLATELET # BLD AUTO: 267 X10E3/UL (ref 150–450)
POTASSIUM SERPL-SCNC: 4.5 MMOL/L (ref 3.5–5.2)
PROT SERPL-MCNC: 6.6 G/DL (ref 6–8.5)
RBC # BLD AUTO: 4.59 X10E6/UL (ref 3.77–5.28)
SODIUM SERPL-SCNC: 140 MMOL/L (ref 134–144)
TRIGL SERPL-MCNC: 111 MG/DL (ref 0–149)
TSH SERPL DL<=0.005 MIU/L-ACNC: 1.94 UIU/ML (ref 0.45–4.5)
VLDLC SERPL CALC-MCNC: 22 MG/DL (ref 5–40)
WBC # BLD AUTO: 6.9 X10E3/UL (ref 3.4–10.8)

## 2019-10-04 NOTE — PROGRESS NOTES
Labs are back and look good. HgbA1c is 7.0, which is still ok but a little higher than last check which was 6.8  Continue to work on diet and exercise. All other labs fine.

## 2019-10-14 ENCOUNTER — TELEPHONE (OUTPATIENT)
Dept: FAMILY MEDICINE CLINIC | Age: 74
End: 2019-10-14

## 2019-10-15 NOTE — PROGRESS NOTES
Called pt, verified name and . Informed pt that per Carolee Randall her A1C is 7.0, which is a little higher than last check of 6.8. Advised pt to continue to work on diet and exercise. Pt stated understanding.

## 2020-01-22 RX ORDER — MELOXICAM 15 MG/1
15 TABLET ORAL DAILY
Qty: 90 TAB | Refills: 1 | Status: SHIPPED | OUTPATIENT
Start: 2020-01-22 | End: 2020-12-29

## 2020-01-22 NOTE — TELEPHONE ENCOUNTER
Pt is calling requesting a refill on her med    Requested Prescriptions     Pending Prescriptions Disp Refills    meloxicam (MOBIC) 15 mg tablet       Sig: Take 1 Tab by mouth daily.

## 2020-01-31 ENCOUNTER — HOSPITAL ENCOUNTER (OUTPATIENT)
Dept: CT IMAGING | Age: 75
Discharge: HOME | End: 2020-01-31
Attending: ORTHOPAEDIC SURGERY
Payer: MEDICARE

## 2020-01-31 DIAGNOSIS — M25.511 CHRONIC RIGHT SHOULDER PAIN: ICD-10-CM

## 2020-01-31 DIAGNOSIS — G89.29 CHRONIC RIGHT SHOULDER PAIN: ICD-10-CM

## 2020-01-31 PROCEDURE — 73200 CT UPPER EXTREMITY W/O DYE: CPT

## 2020-02-05 DIAGNOSIS — E78.5 HYPERLIPIDEMIA, UNSPECIFIED HYPERLIPIDEMIA TYPE: ICD-10-CM

## 2020-02-05 RX ORDER — SIMVASTATIN 20 MG/1
TABLET, FILM COATED ORAL
Qty: 90 TAB | Refills: 4 | Status: SHIPPED | OUTPATIENT
Start: 2020-02-05 | End: 2021-06-28

## 2020-04-27 ENCOUNTER — HOSPITAL ENCOUNTER (OUTPATIENT)
Dept: LAB | Age: 75
Discharge: HOME | End: 2020-04-27
Payer: MEDICARE

## 2020-04-27 PROCEDURE — 82270 OCCULT BLOOD FECES: CPT

## 2020-04-28 LAB — HEMOCCULT STL QL IA: NEGATIVE

## 2020-04-29 NOTE — PROGRESS NOTES
Called pt, verified name and . Informed pt that per [facility] Colon cancer screen negative. Repeat in one year. Pt stated understanding.

## 2020-06-04 ENCOUNTER — TELEPHONE (OUTPATIENT)
Dept: FAMILY MEDICINE CLINIC | Age: 75
End: 2020-06-04

## 2020-06-04 DIAGNOSIS — E78.5 HYPERLIPIDEMIA, UNSPECIFIED HYPERLIPIDEMIA TYPE: Primary | ICD-10-CM

## 2020-06-04 DIAGNOSIS — E11.9 CONTROLLED TYPE 2 DIABETES MELLITUS WITHOUT COMPLICATION, WITHOUT LONG-TERM CURRENT USE OF INSULIN (HCC): ICD-10-CM

## 2020-06-04 DIAGNOSIS — I10 ESSENTIAL HYPERTENSION: ICD-10-CM

## 2020-06-04 DIAGNOSIS — Z13.29 SCREENING FOR THYROID DISORDER: ICD-10-CM

## 2020-06-04 DIAGNOSIS — E78.00 HYPERCHOLESTEROLEMIA: ICD-10-CM

## 2020-06-04 DIAGNOSIS — E55.9 VITAMIN D DEFICIENCY: ICD-10-CM

## 2020-06-04 NOTE — TELEPHONE ENCOUNTER
Patient is requesting to have blood work drawn.  She wants to know if she can go to [facility].     [phone]

## 2020-06-18 RX ORDER — DICLOFENAC SODIUM 50 MG/1
50 TABLET, DELAYED RELEASE ORAL 2 TIMES DAILY
COMMUNITY
End: 2020-12-29

## 2020-06-18 RX ORDER — PANTOPRAZOLE SODIUM 20 MG/1
20 TABLET, DELAYED RELEASE ORAL DAILY
COMMUNITY

## 2020-06-18 NOTE — PERIOP NOTES
[facility]  Ambulatory Surgery Unit  Pre-operative Instructions    Procedure Date  Tuesday, June 23, 2020            Tentative Arrival Time TBD      1. On the day of your procedure, please report to the Ambulatory Surgery Unit Registration Desk and sign in at your designated time. The Ambulatory Surgery Unit is located in [facility] on the [facility] side of the \[facility] [place] [facility]\"" across from the [address] [place] [address]. Please have all of your health insurance cards and a photo ID. 2. You must have someone with you to drive you home as directed by your surgeon. 3. You may have a light breakfast and take normal morning medications. 4. We recommend you do not drink any alcoholic beverages for 24 hours before and after your procedure. 5. Contact your surgeons office for instructions on the following medications: non-steroidal anti-inflammatory drugs (i.e. Advil, Aleve), vitamins, and supplements. (Some surgeons will want you to stop these medications prior to surgery and others may allow you to take them)   **If you are currently taking Plavix, Coumadin, Aspirin and/or other blood-thinning agents, contact your surgeon for instructions. ** Your surgeon will partner with the physician prescribing these medications to determine if it is safe to stop or if you need to continue taking. Please do not stop taking these medications without instructions from your surgeon. 6. In an effort to help prevent surgical site infection, we ask that you shower with an anti-bacterial soap (i.e. Dial or Safeguard) on the morning of your procedure. Do not apply any lotions, powders, or deodorants after showering. 7. Wear comfortable clothes. Wear glasses instead of contacts. Do not bring any jewelry or money (other than copays or fees as instructed). Do not wear make-up, particularly mascara, the morning of your procedure. Wear your hair loose or down, no ponytails, buns, daniel pins or clips.  All body piercings must be removed. 8. You should understand that if you do not follow these instructions your procedure may be cancelled. If your physical condition changes (i.e. fever, cold or flu) please contact your surgeon as soon as possible. 9. It is important that you be on time. If a situation occurs where you may be late, or if you have any questions or problems, please call [phone].    10. Your procedure time may be subject to change. You will receive a phone call the day prior to confirm your arrival time. In an effort to improve the efficiency, privacy, and safety for all of our Pre-op patients visitors are not allowed in the Holding area. Once you arrive and are registered your family/visitors will be asked to remain in your vehicle. The Pre-op staff will call you when they are ready for you to enter the building and will explain to you and your family/visitors that the Pre-op phase is beginning. At this time, if your procedure is outpatient, your family member will be asked to stay in their vehicle until the surgery is complete and you are ready for discharge. If you are going to be admitted after your surgery, once you are called to come inside the building, your family will be able to leave the parking lot. At this time the staff will also ask for your designated spokesperson information in the event that the physician or staff need to provide an update or obtain any pertinent information. The designated spokesperson will be notified if the physician needs to speak to family during the pre-operative phase.and/or in the post op phase. I understand a pre-operative phone call will be made to verify my procedure time. In the event that I am not available, I give permission for a message to be left on my answering service and/or with another person?       yes    Preop instructions reviewed  Pt verbalized understanding.      ___________________      ___________________ ___________________  (Signature of Patient)          (Witness)                   (Date and Time)

## 2020-06-23 ENCOUNTER — HOSPITAL ENCOUNTER (OUTPATIENT)
Age: 75
Setting detail: OUTPATIENT SURGERY
Discharge: HOME | End: 2020-06-23
Attending: PHYSICAL MEDICINE & REHABILITATION | Admitting: PHYSICAL MEDICINE & REHABILITATION
Payer: MEDICARE

## 2020-06-23 ENCOUNTER — APPOINTMENT (OUTPATIENT)
Dept: GENERAL RADIOLOGY | Age: 75
End: 2020-06-23
Attending: PHYSICAL MEDICINE & REHABILITATION
Payer: MEDICARE

## 2020-06-23 VITALS
OXYGEN SATURATION: 97 % | WEIGHT: 205 LBS | DIASTOLIC BLOOD PRESSURE: 74 MMHG | SYSTOLIC BLOOD PRESSURE: 169 MMHG | HEIGHT: 62 IN | TEMPERATURE: 98.3 F | BODY MASS INDEX: 37.73 KG/M2 | RESPIRATION RATE: 18 BRPM | HEART RATE: 61 BPM

## 2020-06-23 LAB
GLUCOSE BLD STRIP.AUTO-MCNC: 143 MG/DL (ref 65–100)
GLUCOSE BLD STRIP.AUTO-MCNC: 204 MG/DL (ref 65–100)
SERVICE CMNT-IMP: ABNORMAL
SERVICE CMNT-IMP: ABNORMAL

## 2020-06-23 PROCEDURE — 77030003666 HC NDL SPINAL BD -A: Performed by: PHYSICAL MEDICINE & REHABILITATION

## 2020-06-23 PROCEDURE — 74011000250 HC RX REV CODE- 250: Performed by: PHYSICAL MEDICINE & REHABILITATION

## 2020-06-23 PROCEDURE — 72100 X-RAY EXAM L-S SPINE 2/3 VWS: CPT

## 2020-06-23 PROCEDURE — 77030003665 HC NDL SPN BBMI -A: Performed by: PHYSICAL MEDICINE & REHABILITATION

## 2020-06-23 PROCEDURE — 82962 GLUCOSE BLOOD TEST: CPT

## 2020-06-23 PROCEDURE — 76000 FLUOROSCOPY <1 HR PHYS/QHP: CPT

## 2020-06-23 PROCEDURE — 76210000046 HC AMBSU PH II REC FIRST 0.5 HR: Performed by: PHYSICAL MEDICINE & REHABILITATION

## 2020-06-23 PROCEDURE — 76030000002 HC AMB SURG OR TIME FIRST 0.: Performed by: PHYSICAL MEDICINE & REHABILITATION

## 2020-06-23 RX ORDER — LIDOCAINE HYDROCHLORIDE 20 MG/ML
5 INJECTION, SOLUTION EPIDURAL; INFILTRATION; INTRACAUDAL; PERINEURAL ONCE
Status: COMPLETED | OUTPATIENT
Start: 2020-06-23 | End: 2020-06-23

## 2020-06-23 RX ORDER — BUPIVACAINE HYDROCHLORIDE 5 MG/ML
5 INJECTION, SOLUTION EPIDURAL; INTRACAUDAL ONCE
Status: COMPLETED | OUTPATIENT
Start: 2020-06-23 | End: 2020-06-23

## 2020-06-23 RX ORDER — METHYLPREDNISOLONE ACETATE 40 MG/ML
80 INJECTION, SUSPENSION INTRA-ARTICULAR; INTRALESIONAL; INTRAMUSCULAR; SOFT TISSUE ONCE
Status: DISCONTINUED | OUTPATIENT
Start: 2020-06-23 | End: 2020-06-23 | Stop reason: HOSPADM

## 2020-06-23 NOTE — H&P
Procedural Case Note    6/23/2020    (1:15 PM)    Sandra Kidd    1945   (69 y.o.)    [id number]    CC:  pain    ROS:   Complete ROS obtained, no CP, no SOB, no N or V    PMH:     Past Medical History:   Diagnosis Date    ADHD     Adverse effect of anesthesia     hard to put to sleep says patient    Anemia     Arthritis     bilateral shoulders, back. Sees rheumatology in [place] (Dr. Tammy Jackson)    Chronic pain     shoulders, back    Degenerative disc disease, lumbar     [place], Dr. Tammy Jackson (RA physician)    Depression     Diabetes [facility])     Pre diabetic: oral medication    Hypercholesterolemia     Hypertension     Memory difficulties     MRSA (methicillin resistant staph aureus) culture positive 06/04/2018    pos for staph, NOT pos for MRSA, pt was treated, neg cx on 08/2018, pt denies hx of MRSA    Sleep apnea     uses CPAP       ALLERGIES:     Allergies   Allergen Reactions    Sulfa (Sulfonamide Antibiotics) Hives    Zoloft [Sertraline] Hives    Beef Containing Products Other (comments)     Break out in bumps      Dicloxacillin Hives and Itching    Pistachio Nut Unknown (comments)     Per pt was from allergy testing, she is not aware of a reaction      Watermelon Unknown (comments)     Little bumps       MEDS:     No current facility-administered medications for this encounter. Visit Vitals  Ht 5' 2\" (1.575 m)   Wt 93 kg (205 lb)   BMI 37.49 kg/m²     PE:  Gen: NAD  Head: normocephalic  Heart: RRR  Lungs: CTA haroon  Abd: NT, ND, soft  Neuro: awake and alert  Skin: intact    IMPRESSION:   LS DJD    Note:  The clinical status was discussed in detail with the patient. The procedure was again discussed and described in detail. All understand and accept the planned procedure and risks; reject other forms of treatment. All questions are answered.     Romana Peal, MD

## 2020-06-23 NOTE — PERIOP NOTES
BS [id number] Pt denies discomfort. 1441Pt has strong and equal plantar and dorsiflexion. Pt able to stand and hold weight without difficulty. No swelling or bleeding noted at injection site. Discharged to home via/wc,accompanied to car per RN. Skin warm and dry, awake and alert. Respirations even, unlabored. Pt and family members questions and concerns addressed prior to discharge. All belongings  (cell phone)with pt.

## 2020-06-23 NOTE — OP NOTES
PROCEDURES PERFORMED:   1. Radiofrequency neuroablation of the medial branches R L2, L3, L4 and L5    PREPROCEDURE DIAGNOSIS: lumbar spondylosis with chronic back pain. POST PROCEDURE DIAGNOSIS: lumbar spondylosis with chronic back pain. SURGEON: Carmella Solorio M.D. HISTORY OF PRESENT ILLNESS AND INDICATIONS FOR THE PROCEDURE: This patient was previously given diagnostic successful blocks of the facet joints innervated by the aforementioned medial branches and is here today for ablation and neurolysis of those branches. She has previously failed conservative management which has proceeded to injection therapy. FINDINGS AND PROCEDURES:  Adequate informed consent was obtained and the patient was taken to the procedure room and placed in the prone position on the procedure table. A time out was held for patient verification. The patient had monitoring throughout the procedure at cycled one minute blood pressure, pulse, and pulse oximetry. The skin was prepped and draped in the normal sterile fashion. Using fluoroscopic guidance in anteroposterior, oblique, and lateral views, the appropriate superior articular processes and pedicles were identified. After identification 1 percent lidocaine skin anesthesia was administered at each site. Using a radiofrequency ablation needle, a 22 gauge 10 millimeter active tip was placed at the medial branch nerves, right L2, L3, L4 and L5. The initial impedance in Ohms was within acceptable limits. Each level was subsequently tested on sensory at 50 megahertz with no referred pattern of pain noted at any level. Each level was also tested at 2 Hertz with no distal motor movement or referred pain pattern. The lesion site was injected with 0.5 milliliters of 0.5 percent bupivacaine anesthesia of the nerve branch. Next a lesion was applied for 90 seconds at 80 degrees. The patient tolerated the procedure well with no apparent complications.  The patient was taken to the recovery area for further monitoring. Post procedure neurologic examination was consistent with preprocedure examination. The patient was discharged home ambulatory with family and was given post procedure instructions. FOLLOW-UP: The patient will have a follow-up appointment scheduled by the clinic in the coming weeks.

## 2020-06-23 NOTE — DISCHARGE INSTRUCTIONS
Radiofrequency Ablation  Discharge Instructions    You had a radiofrequency ablation today. You will probably have some numbness in your lower back area for the next 6-8 hours. You should begin feeling better after a few days, but it may take up to 2 weeks to notice the difference. The benefit you get from your injection will last a variable amount of time, depending on the severity of your spine problem. · Pain:  Most people do not have any increase in pain after this injection. However, you might experience some soreness a few days at the site of the injection. If this happens, putting an ice pack over the sore area will help. · Bandage: You have a small bandage covering the site of the injection. You may remove it when you get home. · Restrictions:  Some one should drive you home after the injection. After that, you have no restrictions. You may resume your normal level of activity. You may take a shower or bath, and you may eat normally. You should continue your current exercise and/or therapy routine. Medications:  Continue your current medications as prescribed. If you pain decreases, you may reduce the amount of your pain medications. If you stopped taking anticoagulants or blood-thinners before the injection, start them tomorrow. Call Dr. Michael Garcia at [phone] if you experience:    · Fever (101 degrees Fahrenheit or greater)  · Nausea or vomiting  · Headache unrelieved by your normal pain medication  · Redness or swelling at the injection site that lasts more than 1 day  · New numbness, tingling, weakness, or pain that you didn't have before the injection      If still having pain in 1-2 weeks, call office at [phone] for a follow up appointment.         DISCHARGE SUMMARY from Nurse    The following personal items collected during your admission are returned to you:   Dental Appliance: Dental Appliances: None  Vision: Visual Aid: Contacts  Hearing Aid:    Calvin Mckeon: Calvin Mckeon: With patient  Clothing: Clothing: With patient  Other Valuables: Other Valuables: Cell Phone, With patient  Valuables sent to safe: If you were given prescriptions, please review the written information on prescribed medications. · You will receive a Post Operative Call from one of the Recovery Room Nurses on the day after your surgery to check on you. It is very important for us to know how you are recovering after your surgery. · You may receive an e-mail or letter in the mail from [employer] regarding your experience with us in the Ambulatory Surgery Unit. Your feedback is valuable to us and we appreciate your participation in the survey. If you have not had your influenza or pneumococcal vaccines, please follow up with your primary care physician. The discharge information has been reviewed with the patient. The patient verbalized understanding. Patient Education        Learning About Coronavirus [phone])  Coronavirus [phone]): Overview  What is coronavirus (LXBMZ-35)? The coronavirus disease (COVID-19) is caused by a virus. It is an illness that was first found in [place], [place], in December 2019. It has since spread worldwide. The virus can cause fever, cough, and trouble breathing. In severe cases, it can cause pneumonia and make it hard to breathe without help. It can cause death. Coronaviruses are a large group of viruses. They cause the common cold. They also cause more serious illnesses like Middle East respiratory syndrome (MERS) and severe acute respiratory syndrome (SARS). COVID-19 is caused by a novel coronavirus. That means it's a new type that has not been seen in people before. This virus spreads person-to-person through droplets from coughing and sneezing. It can also spread when you are close to someone who is infected. And it can spread when you touch something that has the virus on it, such as a doorknob or a tabletop.   What can you do to protect yourself from coronavirus (UXUNU-12)? The best way to protect yourself from getting sick is to:  · Avoid areas where there is an outbreak. · Avoid contact with people who may be infected. · Wash your hands often with soap or alcohol-based hand sanitizers. · Avoid crowds and try to stay at least 6 feet away from other people. · Wash your hands often, especially after you cough or sneeze. Use soap and water, and scrub for at least 20 seconds. If soap and water aren't available, use an alcohol-based hand . · Avoid touching your mouth, nose, and eyes. What can you do to avoid spreading the virus to others? To help avoid spreading the virus to others:  · Cover your mouth with a tissue when you cough or sneeze. Then throw the tissue in the trash. · Use a disinfectant to clean things that you touch often. · Wear a cloth face cover if you have to go to public areas. · Stay home if you are sick or have been exposed to the virus. Don't go to school, work, or public areas. And don't use public transportation, ride-shares, or taxis unless you have no choice. · If you are sick:  ? Leave your home only if you need to get medical care. But call the doctor's office first so they know you're coming. And wear a face cover. ? Wear the face cover whenever you're around other people. It can help stop the spread of the virus when you cough or sneeze. ? Clean and disinfect your home every day. Use household  and disinfectant wipes or sprays. Take special care to clean things that you grab with your hands. These include doorknobs, remote controls, phones, and handles on your refrigerator and microwave. And don't forget countertops, tabletops, bathrooms, and computer keyboards. When to call for help  [id number] anytime you think you may need emergency care. For example, call if:  · You have severe trouble breathing. (You can't talk at all.)  · You have constant chest pain or pressure.   · You are severely dizzy or lightheaded. · You are confused or can't think clearly. · Your face and lips have a blue color. · You pass out (lose consciousness) or are very hard to wake up. Call your doctor now if you develop symptoms such as:  · Shortness of breath. · Fever. · Cough. If you need to get care, call ahead to the doctor's office for instructions before you go. Make sure you wear a face cover to prevent exposing other people to the virus. Where can you get the latest information? The following health organizations are tracking and studying this virus. Their websites contain the most up-to-date information. Mark Smith also learn what to do if you think you may have been exposed to the virus. · U.S. Centers for Disease Control and Prevention (CDC): The CDC provides updated news about the disease and travel advice. The website also tells you how to prevent the spread of infection. www.cdc.gov  · World Health Organization [facility]): WHO offers information about the virus outbreaks. WHO also has travel advice. www.who.int  Current as of: May 8, 2020               Content Version: 12.5  © 1280-2415 Healthwise, [employer]. Care instructions adapted under license by [employer] (which disclaims liability or warranty for this information). If you have questions about a medical condition or this instruction, always ask your healthcare professional. [address] any warranty or liability for your use of this information.

## 2020-06-23 NOTE — PERIOP NOTES
Patti [facility]  1945  [id number]    Situation:  Verbal report given from: MAHIN Guerrero RN  Procedure: Procedure(s):  RIGHT L2-5 RADIO FREQUENCY ABLATION    Background:    Preoperative diagnosis: DEGENERATIVE DISC DISEASE    Postoperative diagnosis: DEGENERATIVE DISC DISEASE    :  Dr. Mendez Drivers:  Intra-procedure medications, procedure, and allergies reviewed        Recommendation:    Discharge patient home after discharge instructions reviewed with patient. Rest until local has worn off.

## 2020-07-09 ENCOUNTER — HOSPITAL ENCOUNTER (OUTPATIENT)
Dept: MAMMOGRAPHY | Age: 75
Discharge: HOME | End: 2020-07-09
Attending: NURSE PRACTITIONER
Payer: MEDICARE

## 2020-07-09 DIAGNOSIS — Z12.31 ENCOUNTER FOR SCREENING MAMMOGRAM FOR MALIGNANT NEOPLASM OF BREAST: ICD-10-CM

## 2020-07-09 PROCEDURE — 77067 SCR MAMMO BI INCL CAD: CPT

## 2020-09-16 ENCOUNTER — OFFICE VISIT (OUTPATIENT)
Dept: FAMILY MEDICINE CLINIC | Age: 75
End: 2020-09-16
Payer: MEDICARE

## 2020-09-16 VITALS
RESPIRATION RATE: 18 BRPM | TEMPERATURE: 97.9 F | WEIGHT: 203.8 LBS | OXYGEN SATURATION: 92 % | DIASTOLIC BLOOD PRESSURE: 74 MMHG | SYSTOLIC BLOOD PRESSURE: 127 MMHG | HEART RATE: 63 BPM | HEIGHT: 62 IN | BODY MASS INDEX: 37.5 KG/M2

## 2020-09-16 DIAGNOSIS — E55.9 VITAMIN D DEFICIENCY: ICD-10-CM

## 2020-09-16 DIAGNOSIS — B37.83 ANGULAR CHEILITIS WITH CANDIDIASIS: ICD-10-CM

## 2020-09-16 DIAGNOSIS — Z23 NEEDS FLU SHOT: ICD-10-CM

## 2020-09-16 DIAGNOSIS — E11.9 CONTROLLED TYPE 2 DIABETES MELLITUS WITHOUT COMPLICATION, WITHOUT LONG-TERM CURRENT USE OF INSULIN (HCC): Primary | ICD-10-CM

## 2020-09-16 DIAGNOSIS — E78.5 HYPERLIPIDEMIA, UNSPECIFIED HYPERLIPIDEMIA TYPE: ICD-10-CM

## 2020-09-16 DIAGNOSIS — I10 ESSENTIAL HYPERTENSION: ICD-10-CM

## 2020-09-16 DIAGNOSIS — Z13.29 SCREENING FOR THYROID DISORDER: ICD-10-CM

## 2020-09-16 DIAGNOSIS — E66.01 OBESITY, MORBID (HCC): ICD-10-CM

## 2020-09-16 LAB
ALBUMIN UR QL STRIP: 10 MG/L
CREATININE, URINE POC: 300 MG/DL
HBA1C MFR BLD HPLC: 7.3 %
MICROALBUMIN/CREAT RATIO POC: <30 MG/G

## 2020-09-16 PROCEDURE — 82044 UR ALBUMIN SEMIQUANTITATIVE: CPT | Performed by: NURSE PRACTITIONER

## 2020-09-16 PROCEDURE — G9717 DOC PT DX DEP/BP F/U NT REQ: HCPCS | Performed by: NURSE PRACTITIONER

## 2020-09-16 PROCEDURE — G8536 NO DOC ELDER MAL SCRN: HCPCS | Performed by: NURSE PRACTITIONER

## 2020-09-16 PROCEDURE — G8417 CALC BMI ABV UP PARAM F/U: HCPCS | Performed by: NURSE PRACTITIONER

## 2020-09-16 PROCEDURE — G8427 DOCREV CUR MEDS BY ELIG CLIN: HCPCS | Performed by: NURSE PRACTITIONER

## 2020-09-16 PROCEDURE — 36415 COLL VENOUS BLD VENIPUNCTURE: CPT | Performed by: NURSE PRACTITIONER

## 2020-09-16 PROCEDURE — G9899 SCRN MAM PERF RSLTS DOC: HCPCS | Performed by: NURSE PRACTITIONER

## 2020-09-16 PROCEDURE — G8754 DIAS BP LESS 90: HCPCS | Performed by: NURSE PRACTITIONER

## 2020-09-16 PROCEDURE — G8399 PT W/DXA RESULTS DOCUMENT: HCPCS | Performed by: NURSE PRACTITIONER

## 2020-09-16 PROCEDURE — 2022F DILAT RTA XM EVC RTNOPTHY: CPT | Performed by: NURSE PRACTITIONER

## 2020-09-16 PROCEDURE — 3017F COLORECTAL CA SCREEN DOC REV: CPT | Performed by: NURSE PRACTITIONER

## 2020-09-16 PROCEDURE — 1101F PT FALLS ASSESS-DOCD LE1/YR: CPT | Performed by: NURSE PRACTITIONER

## 2020-09-16 PROCEDURE — 99214 OFFICE O/P EST MOD 30 MIN: CPT | Performed by: NURSE PRACTITIONER

## 2020-09-16 PROCEDURE — G8752 SYS BP LESS 140: HCPCS | Performed by: NURSE PRACTITIONER

## 2020-09-16 PROCEDURE — 83036 HEMOGLOBIN GLYCOSYLATED A1C: CPT | Performed by: NURSE PRACTITIONER

## 2020-09-16 PROCEDURE — G0463 HOSPITAL OUTPT CLINIC VISIT: HCPCS | Performed by: NURSE PRACTITIONER

## 2020-09-16 PROCEDURE — 99000 SPECIMEN HANDLING OFFICE-LAB: CPT | Performed by: NURSE PRACTITIONER

## 2020-09-16 PROCEDURE — 3051F HG A1C>EQUAL 7.0%<8.0%: CPT | Performed by: NURSE PRACTITIONER

## 2020-09-16 PROCEDURE — 90694 VACC AIIV4 NO PRSRV 0.5ML IM: CPT

## 2020-09-16 PROCEDURE — 1090F PRES/ABSN URINE INCON ASSESS: CPT | Performed by: NURSE PRACTITIONER

## 2020-09-16 RX ORDER — NYSTATIN AND TRIAMCINOLONE ACETONIDE 100000; 1 [USP'U]/G; MG/G
OINTMENT TOPICAL DAILY
Qty: 15 G | Refills: 0 | Status: SHIPPED | OUTPATIENT
Start: 2020-09-16 | End: 2021-01-07

## 2020-09-16 RX ORDER — METOPROLOL SUCCINATE 50 MG/1
TABLET, EXTENDED RELEASE ORAL
Qty: 90 TAB | Refills: 4 | Status: SHIPPED | OUTPATIENT
Start: 2020-09-16 | End: 2022-02-15 | Stop reason: SDUPTHER

## 2020-09-16 NOTE — PROGRESS NOTES
Chief Complaint   Patient presents with    Diabetes     follow-up      Health Maintenance reviewed     1. Have you been to the ER, urgent care clinic since your last visit? Hospitalized since your last visit? No     2. Have you seen or consulted any other health care providers outside of the [address] [place] [address] since your last visit? Include any pap smears or colon screening.   Yes, on file

## 2020-09-16 NOTE — PROGRESS NOTES
Chief Complaint   Patient presents with    Diabetes     follow-up          S: Joseph Cordon is a 69 y.o. yo female who presents for DM follow up. Last DM check hemoglobin A1c 10/3/19 was 7.0    Blood sugars- at home, sometimes elevated in morning and she isnt sure why, usually her blood sugars are 140-150  Last eye exam- UTD  Foot exam- no wounds, no tingling. Diet and Exercise- tries to walk daily, watches diet. RA and chronic back pain:  Followed by rheumatology, Dr Indio Farley, in [place]. Put her on Diclofenac, took off mobic. She is also on pantoprazole. Cymbalta 60mg in AM and 30 mg in PM  Gabapentin TID      RUBÉN  Seen recently by sleep medicine. Wearing machine as directed but has broken sleep secondary to husbands dementia. Health Maintenance Reviewed    Denies cardiac complaints including chest pain or discomfort, elevated heart rate, or palpitations. Denies any headache, vision changes, numbness and tingling or weakness in her extremities. Denies respiratory complaints including SOB, difficulty or pain with breathing, wheezes, and cough. Feels well and ROS is otherwise negative. Past Medical History:   Diagnosis Date    ADHD     Adverse effect of anesthesia     hard to put to sleep says patient    Anemia     Arthritis     bilateral shoulders, back.   Sees rheumatology in [place] (Dr. Indio Farley)    Chronic pain     shoulders, back    Degenerative disc disease, lumbar     WebbDr. Indio isabel (RA physician)    Depression     Diabetes [facility])     Pre diabetic: oral medication    Hypercholesterolemia     Hypertension     Memory difficulties     MRSA (methicillin resistant staph aureus) culture positive 06/04/2018    pos for staph, NOT pos for MRSA, pt was treated, neg cx on 08/2018, pt denies hx of MRSA    Sleep apnea     uses CPAP      Past Surgical History:   Procedure Laterality Date    HX APPENDECTOMY  1999    HX CARPAL TUNNEL RELEASE Bilateral     HX GYN  2008 hysterectomy    HX HIP REPLACEMENT Bilateral     HX HYSTEROSCOPY      HX KNEE REPLACEMENT Bilateral 2002    HX ORTHOPAEDIC  2014    right hip revision    HX ORTHOPAEDIC  2003, 2004    bilateral CTR     Social History     Socioeconomic History    Marital status:      Spouse name: Not on file    Number of children: Not on file    Years of education: Not on file    Highest education level: Not on file   Occupational History    Not on file   Social Needs    Financial resource strain: Not on file    Food insecurity     Worry: Not on file     Inability: Not on file    Transportation needs     Medical: Not on file     Non-medical: Not on file   Tobacco Use    Smoking status: Never Smoker    Smokeless tobacco: Never Used   Substance and Sexual Activity    Alcohol use: No    Drug use: No    Sexual activity: Not on file   Lifestyle    Physical activity     Days per week: Not on file     Minutes per session: Not on file    Stress: Not on file   Relationships    Social connections     Talks on phone: Not on file     Gets together: Not on file     Attends Cheondoism service: Not on file     Active member of club or organization: Not on file     Attends meetings of clubs or organizations: Not on file     Relationship status: Not on file    Intimate partner violence     Fear of current or ex partner: Not on file     Emotionally abused: Not on file     Physically abused: Not on file     Forced sexual activity: Not on file   Other Topics Concern    Not on file   Social History Narrative    Not on file     Current Outpatient Medications   Medication Sig Dispense Refill    diclofenac EC (VOLTAREN) 50 mg EC tablet Take 50 mg by mouth two (2) times a day.  pantoprazole (Protonix) 20 mg tablet Take 20 mg by mouth daily.  simvastatin (ZOCOR) 20 mg tablet TAKE 1 TABLET NIGHTLY 90 Tab 4    metFORMIN (GLUCOPHAGE) 500 mg tablet Take 1 Tab by mouth two (2) times daily (with meals).  Indications: prevention of type 2 diabetes mellitus 180 Tab 3    Blood-Glucose Meter monitoring kit Check blood sugar daily. Dx code E11.9 1 Kit 0    glucose blood VI test strips (ASCENSIA AUTODISC VI, ONE TOUCH ULTRA TEST VI) strip Check blood sugar daily. Dx code E11.9 100 Strip 3    lancets misc Check blood sugar daily. Dx code E11.9 100 Each 11    metoprolol succinate (TOPROL XL) 50 mg XL tablet TAKE 1 TABLET DAILY 90 Tab 4    melatonin 3 mg tablet Take 3 mg by mouth nightly as needed.  nystatin-triamcinolone (MYCOLOG) 100,000-0.1 unit/gram-% ointment Apply  to affected area daily. 15 g 0    OTHER,NON-FORMULARY, Omega XL fatty acid 1200mg daily      DULoxetine (CYMBALTA) 30 mg capsule Take 1 cap by mouth every evening. (Patient taking differently: Take 30 mg by mouth every evening. Take 1 cap by mouth every evening.) 90 Cap 1    DULoxetine (CYMBALTA) 60 mg capsule Take 1 cap by mouth every evening. (Patient taking differently: Take 60 mg by mouth daily. Indications: CHRONIC MUSCULOSKELETAL PAIN) 90 Cap 1    gabapentin (NEURONTIN) 300 mg capsule TAKE 1 CAPSULE BY MOUTH 5 TIMES DAILY (Patient taking differently: Indications: pt reports taking 3 tab three times daily) 420 Cap 0    VOLTAREN 1 % gel Apply 2 g to affected area nightly. Bilateral shoulders      meloxicam (MOBIC) 15 mg tablet Take 1 Tab by mouth daily. 90 Tab 1    cholecalciferol (VITAMIN D3) 1,000 unit cap Take 1,000 Units by mouth daily. Pt is taking all medications as prescribed without any side effects or difficulty. Allergies   Allergen Reactions    Sulfa (Sulfonamide Antibiotics) Hives    Zoloft [Sertraline] Hives    Beef Containing Products Other (comments)     Break out in bumps      Dicloxacillin Hives and Itching    Pistachio Nut Unknown (comments)     Per pt was from allergy testing, she is not aware of a reaction      Watermelon Unknown (comments)     Little bumps         Agree with nurses note.     O:   Visit Vitals  /74   Pulse 63   Temp 97.9 °F (36.6 °C) (Temporal)   Resp 18   Ht 5' 2\" (1.575 m)   Wt 203 lb 12.8 oz (92.4 kg)   LMP  (LMP Unknown)   SpO2 92%   BMI 37.28 kg/m²      PAIN: No complaints of pain today. GENERAL: Terell Lopez  is sitting in the chair in NAD.   EYE: PERRLA. EOMs intact. Sclera anicteric without injection. RESP: Unlabored without SOB. Speaking in full sentences. Breath sounds are symmetrical bilaterally. Clear to auscultation to all fields. No wheezes. No rales or rhonchi. CV: normal rate. Regular rhythm. S1, S2 audible. No murmur noted. No rubs, clicks or gallops noted. NEURO:  awake, alert and oriented to person, place, and time and event. Clear speech. Muscle strength is +5/5 x 4 extremities. Steady gait. HEME/LYMPH: peripheral pulses palpable 2+ x 4 extremities. No peripheral edema is noted. FEET: Intact no wounds or abrasions. Denies numbness or tingling. Results for orders placed or performed in visit on 09/16/20   AMB POC URINE, MICROALBUMIN, SEMIQUANT (3 RESULTS)   Result Value Ref Range    ALBUMIN, URINE POC 10 Negative mg/L    CREATININE, URINE  mg/dL    Microalbumin/creat ratio (POC) <30 <30 MG/G   AMB POC HEMOGLOBIN A1C   Result Value Ref Range    Hemoglobin A1c (POC) 7.3 %         A/P:  Differential diagnosis and treatment options reviewed with patient who is in agreement with treatment plan as outlined below. ICD-10-CM ICD-9-CM    1. Controlled type 2 diabetes mellitus without complication, without long-term current use of insulin ([facility])  C33.2 686.34 METABOLIC PANEL, COMPREHENSIVE      CBC WITH AUTOMATED DIFF      AMB POC URINE, MICROALBUMIN, SEMIQUANT (3 RESULTS)      AMB POC HEMOGLOBIN A1C      WA HANDLG&/OR CONVEY OF SPEC FOR TR OFFICE TO LAB      COLLECTION VENOUS BLOOD,VENIPUNCTURE      glucose blood VI test strips (ASCENSIA AUTODISC VI, ONE TOUCH ULTRA TEST VI) strip   2.  Hyperlipidemia, unspecified hyperlipidemia type  E78.5 272.4 LIPID PANEL   3. Essential hypertension  I10 401.9    4. Vitamin D deficiency  E55.9 268.9 VITAMIN D, 25 HYDROXY   5. Screening for thyroid disorder  Z13.29 V77.0 TSH 3RD GENERATION   6. Obesity, morbid ([address]  E66.01 278.01    7. Needs flu shot  Z23 V04.81 FLU (FLUAD QUAD INFLUENZA VACCINE,QUAD,ADJUVANTED)     BP at goal.  No change in therapy. DM is stable on current therapy. Will continue to monitor blood sugars at home. Increase water intake. Discussed BMI and healthy weight. Encouraged patient to work to implement changes including diet high in raw fruits and vegetables, lean protein and good fats. Limit refined, processed carbohydrates and sugar. Encouraged regular exercise. Advised of frequent feet checks  Advised yearly eye exam  Reviewed warning signs of diabetic emergency, hypertension, stroke and heart attack   Follow up three months or sooner if needed. Verbal and written instructions (see AVS) provided. Patient expresses understanding and agreement of diagnosis and treatment plan.

## 2020-09-16 NOTE — PATIENT INSTRUCTIONS
Learning About Meal Planning for Diabetes Why plan your meals? Meal planning can be a key part of managing diabetes. Planning meals and snacks with the right balance of carbohydrate, protein, and fat can help you keep your blood sugar at the target level you set with your doctor. You don't have to eat special foods. You can eat what your family eats, including sweets once in a while. But you do have to pay attention to how often you eat and how much you eat of certain foods. You may want to work with a dietitian or a certified diabetes educator. He or she can give you tips and meal ideas and can answer your questions about meal planning. This health professional can also help you reach a healthy weight if that is one of your goals. What plan is right for you? Your dietitian or diabetes educator may suggest that you start with the plate format or carbohydrate counting. The plate format The plate format is a simple way to help you manage how you eat. You plan meals by learning how much space each food should take on a plate. Using the plate format helps you spread carbohydrate throughout the day. It can make it easier to keep your blood sugar level within your target range. It also helps you see if you're eating healthy portion sizes. To use the plate format, you put non-starchy vegetables on half your plate. Add meat or meat substitutes on one-quarter of the plate. Put a grain or starchy vegetable (such as brown rice or a potato) on the final quarter of the plate. You can add a small piece of fruit and some low-fat or fat-free milk or yogurt, depending on your carbohydrate goal for each meal. 
Here are some tips for using the plate format: · Make sure that you are not using an oversized plate. A 9-inch plate is best. Many restaurants use larger plates. · Get used to using the plate format at home. Then you can use it when you eat out. · Write down your questions about using the plate format. Talk to your doctor, a dietitian, or a diabetes educator about your concerns. Carbohydrate counting With carbohydrate counting, you plan meals based on the amount of carbohydrate in each food. Carbohydrate raises blood sugar higher and more quickly than any other nutrient. It is found in desserts, breads and cereals, and fruit. It's also found in starchy vegetables such as potatoes and corn, grains such as rice and pasta, and milk and yogurt. Spreading carbohydrate throughout the day helps keep your blood sugar levels within your target range. Your daily amount depends on several things, including your weight, how active you are, which diabetes medicines you take, and what your goals are for your blood sugar levels. A registered dietitian or diabetes educator can help you plan how much carbohydrate to include in each meal and snack. A guideline for your daily amount of carbohydrate is: · 45 to 60 grams at each meal. That's about the same as 3 to 4 carbohydrate servings. · 15 to 20 grams at each snack. That's about the same as 1 carbohydrate serving. The Nutrition Facts label on packaged foods tells you how much carbohydrate is in a serving of the food. First, look at the serving size on the food label. Is that the amount you eat in a serving? All of the nutrition information on a food label is based on that serving size. So if you eat more or less than that, you'll need to adjust the other numbers. Total carbohydrate is the next thing you need to look for on the label. If you count carbohydrate servings, one serving of carbohydrate is 15 grams. For foods that don't come with labels, such as fresh fruits and vegetables, you'll need a guide that lists carbohydrate in these foods. Ask your doctor, dietitian, or diabetes educator about books or other nutrition guides you can use.  
If you take insulin, you need to know how many grams of carbohydrate are in a meal. This lets you know how much rapid-acting insulin to take before you eat. If you use an insulin pump, you get a constant rate of insulin during the day. So the pump must be programmed at meals to give you extra insulin to cover the rise in blood sugar after meals. When you know how much carbohydrate you will eat, you can take the right amount of insulin. Or, if you always use the same amount of insulin, you need to make sure that you eat the same amount of carbohydrate at meals. If you need more help to understand carbohydrate counting and food labels, ask your doctor, dietitian, or diabetes educator. How do you get started with meal planning? Here are some tips to get started: 
· Plan your meals a week at a time. Don't forget to include snacks too. · Use cookbooks or online recipes to plan several main meals. Plan some quick meals for busy nights. You also can double some recipes that freeze well. Then you can save half for other busy nights when you don't have time to cook. · Make sure you have the ingredients you need for your recipes. If you're running low on basic items, put these items on your shopping list too. · List foods that you use to make breakfasts, lunches, and snacks. List plenty of fruits and vegetables. · Post this list on the refrigerator. Add to it as you think of more things you need. · Take the list to the store to do your weekly shopping. Follow-up care is a key part of your treatment and safety. Be sure to make and go to all appointments, and call your doctor if you are having problems. It's also a good idea to know your test results and keep a list of the medicines you take. Where can you learn more? Go to http://www.[employer].com/ Samantha Venegas in the search box to learn more about \"Learning About Meal Planning for Diabetes. \" Current as of: December 20, 2019               Content Version: 12.6 © 4627-6666 [employer]. Care instructions adapted under license by [employer] (which disclaims liability or warranty for this information). If you have questions about a medical condition or this instruction, always ask your healthcare professional. [address] any warranty or liability for your use of this information. DASH Diet: Care Instructions Your Care Instructions The DASH diet is an eating plan that can help lower your blood pressure. DASH stands for Dietary Approaches to Stop Hypertension. Hypertension is high blood pressure. The DASH diet focuses on eating foods that are high in calcium, potassium, and magnesium. These nutrients can lower blood pressure. The foods that are highest in these nutrients are fruits, vegetables, low-fat dairy products, nuts, seeds, and legumes. But taking calcium, potassium, and magnesium supplements instead of eating foods that are high in those nutrients does not have the same effect. The DASH diet also includes whole grains, fish, and poultry. The DASH diet is one of several lifestyle changes your doctor may recommend to lower your high blood pressure. Your doctor may also want you to decrease the amount of sodium in your diet. Lowering sodium while following the DASH diet can lower blood pressure even further than just the DASH diet alone. Follow-up care is a key part of your treatment and safety. Be sure to make and go to all appointments, and call your doctor if you are having problems. It's also a good idea to know your test results and keep a list of the medicines you take. How can you care for yourself at home? Following the DASH diet · Eat 4 to 5 servings of fruit each day. A serving is 1 medium-sized piece of fruit, ½ cup chopped or canned fruit, 1/4 cup dried fruit, or 4 ounces (½ cup) of fruit juice. Choose fruit more often than fruit juice. · Eat 4 to 5 servings of vegetables each day.  A serving is 1 cup of lettuce or raw leafy vegetables, ½ cup of chopped or cooked vegetables, or 4 ounces (½ cup) of vegetable juice. Choose vegetables more often than vegetable juice. · Get 2 to 3 servings of low-fat and fat-free dairy each day. A serving is 8 ounces of milk, 1 cup of yogurt, or 1 ½ ounces of cheese. · Eat 6 to 8 servings of grains each day. A serving is 1 slice of bread, 1 ounce of dry cereal, or ½ cup of cooked rice, pasta, or cooked cereal. Try to choose whole-grain products as much as possible. · Limit lean meat, poultry, and fish to 2 servings each day. A serving is 3 ounces, about the size of a deck of cards. · Eat 4 to 5 servings of nuts, seeds, and legumes (cooked dried beans, lentils, and split peas) each week. A serving is 1/3 cup of nuts, 2 tablespoons of seeds, or ½ cup of cooked beans or peas. · Limit fats and oils to 2 to 3 servings each day. A serving is 1 teaspoon of vegetable oil or 2 tablespoons of salad dressing. · Limit sweets and added sugars to 5 servings or less a week. A serving is 1 tablespoon jelly or jam, ½ cup sorbet, or 1 cup of lemonade. · Eat less than 2,300 milligrams (mg) of sodium a day. If you limit your sodium to 1,500 mg a day, you can lower your blood pressure even more. Tips for success · Start small. Do not try to make dramatic changes to your diet all at once. You might feel that you are missing out on your favorite foods and then be more likely to not follow the plan. Make small changes, and stick with them. Once those changes become habit, add a few more changes. · Try some of the following: ? Make it a goal to eat a fruit or vegetable at every meal and at snacks. This will make it easy to get the recommended amount of fruits and vegetables each day. ? Try yogurt topped with fruit and nuts for a snack or healthy dessert. ? Add lettuce, tomato, cucumber, and onion to sandwiches.  
? Combine a ready-made pizza crust with low-fat mozzarella cheese and lots of vegetable toppings. Try using tomatoes, squash, spinach, broccoli, carrots, cauliflower, and onions. ? Have a variety of cut-up vegetables with a low-fat dip as an appetizer instead of chips and dip. ? Sprinkle sunflower seeds or chopped almonds over salads. Or try adding chopped walnuts or almonds to cooked vegetables. ? Try some vegetarian meals using beans and peas. Add garbanzo or kidney beans to salads. Make burritos and tacos with mashed gannon beans or black beans. Where can you learn more? Go to http://breann-johnnie.info/ Enter W149 in the search box to learn more about \"DASH Diet: Care Instructions. \" Current as of: December 16, 2019               Content Version: 12.6 © 2512-3671 [employer]. Care instructions adapted under license by [employer]) (which disclaims liability or warranty for this information). If you have questions about a medical condition or this instruction, always ask your healthcare professional. [address] any warranty or liability for your use of this information.

## 2020-09-17 LAB
25(OH)D3+25(OH)D2 SERPL-MCNC: 33.5 NG/ML (ref 30–100)
ALBUMIN SERPL-MCNC: 4.5 G/DL (ref 3.7–4.7)
ALBUMIN/GLOB SERPL: 2.4 {RATIO} (ref 1.2–2.2)
ALP SERPL-CCNC: 82 IU/L (ref 39–117)
ALT SERPL-CCNC: 34 IU/L (ref 0–32)
AST SERPL-CCNC: 27 IU/L (ref 0–40)
BASOPHILS # BLD AUTO: 0 X10E3/UL (ref 0–0.2)
BASOPHILS NFR BLD AUTO: 1 %
BILIRUB SERPL-MCNC: 0.5 MG/DL (ref 0–1.2)
BUN SERPL-MCNC: 15 MG/DL (ref 8–27)
BUN/CREAT SERPL: 22 (ref 12–28)
CALCIUM SERPL-MCNC: 9 MG/DL (ref 8.7–10.3)
CHLORIDE SERPL-SCNC: 98 MMOL/L (ref 96–106)
CHOLEST SERPL-MCNC: 137 MG/DL (ref 100–199)
CO2 SERPL-SCNC: 25 MMOL/L (ref 20–29)
CREAT SERPL-MCNC: 0.69 MG/DL (ref 0.57–1)
EOSINOPHIL # BLD AUTO: 0.1 X10E3/UL (ref 0–0.4)
EOSINOPHIL NFR BLD AUTO: 1 %
ERYTHROCYTE [DISTWIDTH] IN BLOOD BY AUTOMATED COUNT: 12.5 % (ref 11.7–15.4)
GLOBULIN SER CALC-MCNC: 1.9 G/DL (ref 1.5–4.5)
GLUCOSE SERPL-MCNC: 164 MG/DL (ref 65–99)
HCT VFR BLD AUTO: 39.3 % (ref 34–46.6)
HDLC SERPL-MCNC: 42 MG/DL
HGB BLD-MCNC: 12.7 G/DL (ref 11.1–15.9)
IMM GRANULOCYTES # BLD AUTO: 0 X10E3/UL (ref 0–0.1)
IMM GRANULOCYTES NFR BLD AUTO: 0 %
INTERPRETATION, 910389: NORMAL
LDLC SERPL CALC-MCNC: 75 MG/DL (ref 0–99)
LYMPHOCYTES # BLD AUTO: 2 X10E3/UL (ref 0.7–3.1)
LYMPHOCYTES NFR BLD AUTO: 36 %
Lab: NORMAL
MCH RBC QN AUTO: 29 PG (ref 26.6–33)
MCHC RBC AUTO-ENTMCNC: 32.3 G/DL (ref 31.5–35.7)
MCV RBC AUTO: 90 FL (ref 79–97)
MONOCYTES # BLD AUTO: 0.5 X10E3/UL (ref 0.1–0.9)
MONOCYTES NFR BLD AUTO: 10 %
NEUTROPHILS # BLD AUTO: 2.9 X10E3/UL (ref 1.4–7)
NEUTROPHILS NFR BLD AUTO: 52 %
PLATELET # BLD AUTO: 224 X10E3/UL (ref 150–450)
POTASSIUM SERPL-SCNC: 4.5 MMOL/L (ref 3.5–5.2)
PROT SERPL-MCNC: 6.4 G/DL (ref 6–8.5)
RBC # BLD AUTO: 4.38 X10E6/UL (ref 3.77–5.28)
SODIUM SERPL-SCNC: 140 MMOL/L (ref 134–144)
TRIGL SERPL-MCNC: 107 MG/DL (ref 0–149)
TSH SERPL DL<=0.005 MIU/L-ACNC: 2.01 UIU/ML (ref 0.45–4.5)
VLDLC SERPL CALC-MCNC: 20 MG/DL (ref 5–40)
WBC # BLD AUTO: 5.6 X10E3/UL (ref 3.4–10.8)

## 2020-10-22 ENCOUNTER — TELEPHONE (OUTPATIENT)
Dept: FAMILY MEDICINE CLINIC | Age: 75
End: 2020-10-22

## 2020-10-27 NOTE — TELEPHONE ENCOUNTER
Minna is requesting a refill on med    Requested Prescriptions     Pending Prescriptions Disp Refills    fluconazole (DIFLUCAN) 150 mg tablet 1 Tab 0     Sig: Take 1 Tab by mouth daily for 1 day. FDA advises cautious prescribing of oral fluconazole in pregnancy.

## 2020-10-28 RX ORDER — FLUCONAZOLE 150 MG/1
150 TABLET ORAL DAILY
Qty: 1 TAB | Refills: 0 | Status: SHIPPED | OUTPATIENT
Start: 2020-10-28 | End: 2020-10-29

## 2020-11-23 ENCOUNTER — TELEPHONE (OUTPATIENT)
Dept: FAMILY MEDICINE CLINIC | Age: 75
End: 2020-11-23

## 2020-11-25 NOTE — TELEPHONE ENCOUNTER
Pt states her blood sugars has been in the 200s and states she been under a lot if stress lately from taking care of her , she hasnt been eating the right things like this morning she had chicken biscuit and apple pie she also missed her medication this morning her currently blood sugar is 345.  I let pt know she needs to cut back  sugar and make sure she is taking her medications

## 2020-11-30 NOTE — TELEPHONE ENCOUNTER
Please schedule patient appointment to discuss but yes, she needs to take her medications and start eating healthier to control her blood sugars.

## 2020-12-29 RX ORDER — DICLOFENAC POTASSIUM 50 MG/1
50 TABLET, FILM COATED ORAL 2 TIMES DAILY
COMMUNITY
End: 2022-06-29 | Stop reason: ALTCHOICE

## 2020-12-29 NOTE — PERIOP NOTES
[facility]  Ambulatory Surgery Unit  Pre-operative Instructions    Procedure Date  01/05/21            Tentative Arrival Time 1230      1. On the day of your procedure, please report to the Ambulatory Surgery Unit Registration Desk and sign in at your designated time. The Ambulatory Surgery Unit is located in [facility] on the [facility] side of the hospitals across from the [address] [place] [address]. Please have all of your health insurance cards and a photo ID. 2. You must have someone with you to drive you home as directed by your surgeon. 3. You may have a light breakfast and take normal morning medications. 4. We recommend you do not drink any alcoholic beverages for 24 hours before and after your procedure. 5. Contact your surgeons office for instructions on the following medications: non-steroidal anti-inflammatory drugs (i.e. Advil, Aleve), vitamins, and supplements. (Some surgeons will want you to stop these medications prior to surgery and others may allow you to take them)   **If you are currently taking Plavix, Coumadin, Aspirin and/or other blood-thinning agents, contact your surgeon for instructions. ** Your surgeon will partner with the physician prescribing these medications to determine if it is safe to stop or if you need to continue taking. Please do not stop taking these medications without instructions from your surgeon. 6. In an effort to help prevent surgical site infection, we ask that you shower with an anti-bacterial soap (i.e. Dial or Safeguard) on the morning of your procedure. Do not apply any lotions, powders, or deodorants after showering. 7. Wear comfortable clothes. Wear glasses instead of contacts. Do not bring any jewelry or money (other than copays or fees as instructed). Do not wear make-up, particularly mascara, the morning of your procedure. Wear your hair loose or down, no ponytails, buns, daniel pins or clips.  All body piercings must be removed. 8. You should understand that if you do not follow these instructions your procedure may be cancelled. If your physical condition changes (i.e. fever, cold or flu) please contact your surgeon as soon as possible. 9. It is important that you be on time. If a situation occurs where you may be late, or if you have any questions or problems, please call [phone].    10. Your procedure time may be subject to change. You will receive a phone call the day prior to confirm your arrival time. I understand a pre-operative phone call will be made to verify my procedure time. In the event that I am not available, I give permission for a message to be left on my answering service and/or with another person?       yes         ___________________      ___________________      ___________________  (Signature of Patient)          (Witness)                   (Date and Time)

## 2021-01-01 NOTE — TELEPHONE ENCOUNTER
Ms. Dano Morrison stated she would like to stop the vyvanse and needed to know if she should taper it or can she just stop it. Erick Noe said okay to just stop it. Ms. Dano Morrison said she will stop it for now and see how she does. (2) more than 100 beats/min

## 2021-01-04 NOTE — PERIOP NOTES
Spoke with Leeann from Dr. Somers Seats office. Case was just posted MAC because of type of case.   Fine to go local.

## 2021-01-05 ENCOUNTER — HOSPITAL ENCOUNTER (OUTPATIENT)
Age: 76
Setting detail: OUTPATIENT SURGERY
Discharge: HOME | End: 2021-01-05
Attending: PHYSICAL MEDICINE & REHABILITATION | Admitting: PHYSICAL MEDICINE & REHABILITATION
Payer: MEDICARE

## 2021-01-05 ENCOUNTER — APPOINTMENT (OUTPATIENT)
Dept: GENERAL RADIOLOGY | Age: 76
End: 2021-01-05
Attending: PHYSICAL MEDICINE & REHABILITATION
Payer: MEDICARE

## 2021-01-05 VITALS
HEART RATE: 63 BPM | RESPIRATION RATE: 17 BRPM | BODY MASS INDEX: 36.8 KG/M2 | HEIGHT: 62 IN | OXYGEN SATURATION: 94 % | TEMPERATURE: 98.1 F | DIASTOLIC BLOOD PRESSURE: 78 MMHG | WEIGHT: 200 LBS | SYSTOLIC BLOOD PRESSURE: 156 MMHG

## 2021-01-05 LAB
GLUCOSE BLD STRIP.AUTO-MCNC: 247 MG/DL (ref 65–100)
SERVICE CMNT-IMP: ABNORMAL

## 2021-01-05 PROCEDURE — 74011000250 HC RX REV CODE- 250: Performed by: PHYSICAL MEDICINE & REHABILITATION

## 2021-01-05 PROCEDURE — 77030003665 HC NDL SPN BBMI -A: Performed by: PHYSICAL MEDICINE & REHABILITATION

## 2021-01-05 PROCEDURE — 76210000046 HC AMBSU PH II REC FIRST 0.5 HR: Performed by: PHYSICAL MEDICINE & REHABILITATION

## 2021-01-05 PROCEDURE — 74011250636 HC RX REV CODE- 250/636: Performed by: PHYSICAL MEDICINE & REHABILITATION

## 2021-01-05 PROCEDURE — 72020 X-RAY EXAM OF SPINE 1 VIEW: CPT

## 2021-01-05 PROCEDURE — 82962 GLUCOSE BLOOD TEST: CPT

## 2021-01-05 PROCEDURE — 76030000002 HC AMB SURG OR TIME FIRST 0.: Performed by: PHYSICAL MEDICINE & REHABILITATION

## 2021-01-05 PROCEDURE — 2709999900 HC NON-CHARGEABLE SUPPLY: Performed by: PHYSICAL MEDICINE & REHABILITATION

## 2021-01-05 PROCEDURE — 77030034847: Performed by: PHYSICAL MEDICINE & REHABILITATION

## 2021-01-05 PROCEDURE — 76000 FLUOROSCOPY <1 HR PHYS/QHP: CPT

## 2021-01-05 RX ORDER — SODIUM CHLORIDE 0.9 % (FLUSH) 0.9 %
5-40 SYRINGE (ML) INJECTION AS NEEDED
Status: DISCONTINUED | OUTPATIENT
Start: 2021-01-05 | End: 2021-01-05 | Stop reason: HOSPADM

## 2021-01-05 RX ORDER — FENTANYL CITRATE 50 UG/ML
25 INJECTION, SOLUTION INTRAMUSCULAR; INTRAVENOUS
Status: DISCONTINUED | OUTPATIENT
Start: 2021-01-05 | End: 2021-01-05 | Stop reason: HOSPADM

## 2021-01-05 RX ORDER — SODIUM CHLORIDE 0.9 % (FLUSH) 0.9 %
5-40 SYRINGE (ML) INJECTION EVERY 8 HOURS
Status: DISCONTINUED | OUTPATIENT
Start: 2021-01-05 | End: 2021-01-05 | Stop reason: HOSPADM

## 2021-01-05 RX ORDER — LIDOCAINE HYDROCHLORIDE 20 MG/ML
5 INJECTION, SOLUTION INFILTRATION; PERINEURAL ONCE
Status: COMPLETED | OUTPATIENT
Start: 2021-01-05 | End: 2021-01-05

## 2021-01-05 RX ORDER — ONDANSETRON 2 MG/ML
4 INJECTION INTRAMUSCULAR; INTRAVENOUS AS NEEDED
Status: DISCONTINUED | OUTPATIENT
Start: 2021-01-05 | End: 2021-01-05 | Stop reason: HOSPADM

## 2021-01-05 RX ORDER — METHYLPREDNISOLONE ACETATE 40 MG/ML
40 INJECTION, SUSPENSION INTRA-ARTICULAR; INTRALESIONAL; INTRAMUSCULAR; SOFT TISSUE ONCE
Status: COMPLETED | OUTPATIENT
Start: 2021-01-05 | End: 2021-01-05

## 2021-01-05 RX ORDER — BUPIVACAINE HYDROCHLORIDE 5 MG/ML
5 INJECTION, SOLUTION EPIDURAL; INTRACAUDAL ONCE
Status: COMPLETED | OUTPATIENT
Start: 2021-01-05 | End: 2021-01-05

## 2021-01-05 RX ORDER — SODIUM CHLORIDE, SODIUM LACTATE, POTASSIUM CHLORIDE, CALCIUM CHLORIDE 600; 310; 30; 20 MG/100ML; MG/100ML; MG/100ML; MG/100ML
25 INJECTION, SOLUTION INTRAVENOUS CONTINUOUS
Status: DISCONTINUED | OUTPATIENT
Start: 2021-01-05 | End: 2021-01-05 | Stop reason: HOSPADM

## 2021-01-05 NOTE — H&P
Procedural Case Note    1/5/2021    (1:20 PM)    Odell Batch    1945   (70 y.o.)    [id number]    CC:  pain    ROS:   Complete ROS obtained, no CP, no SOB, no N or V    PMH:     Past Medical History:   Diagnosis Date    ADHD     Adverse effect of anesthesia     hard to put to sleep says patient    Anemia     Arthritis     bilateral shoulders, back.   Sees rheumatology in [place] (Dr. Shea Cloud)    Chronic pain     shoulders, back    Degenerative disc disease, lumbar     [place], Dr. Shea Cloud ([place] physician)    Depression     Diabetes [facility])     Pre diabetic: oral medication    Hypercholesterolemia     Hypertension     Memory difficulties     MRSA (methicillin resistant staph aureus) culture positive 06/04/2018    pos for staph, NOT pos for MRSA, pt was treated, neg cx on 08/2018, pt denies hx of MRSA    Sleep apnea     uses CPAP       ALLERGIES:     Allergies   Allergen Reactions    Other Food Rash     Citrullus Vulgaris (watermelon)    Sulfa (Sulfonamide Antibiotics) Hives    Zoloft [Sertraline] Hives    Beef Containing Products Other (comments)     Break out in bumps      Dicloxacillin Hives and Itching    Pistachio Nut Unknown (comments)     Per pt was from allergy testing, she is not aware of a reaction      Watermelon Unknown (comments)     Little bumps       MEDS:     Current Facility-Administered Medications   Medication Dose Route Frequency    sodium chloride (NS) flush 5-40 mL  5-40 mL IntraVENous Q8H    sodium chloride (NS) flush 5-40 mL  5-40 mL IntraVENous PRN    methylPREDNISolone acetate (DEPO-MEDROL) 40 mg/mL injection 40 mg  40 mg IntraMUSCular ONCE    bupivacaine (PF) (MARCAINE) 0.5 % (5 mg/mL) injection 25 mg  5 mL Epidural ONCE    lidocaine (XYLOCAINE) 20 mg/mL (2 %) injection 100 mg  5 mL IntraDERMal ONCE    iohexoL (OMNIPAQUE) 180 mg iodine/mL injection 5 mL  5 mL Intra artICUlar ONCE          Visit Vitals  BP (!) 152/67 (BP 1 Location: Right arm, BP Patient Position: At rest)   Pulse 66   Temp 98.1 °F (36.7 °C)   Resp 17   Ht 5' 2\" (1.575 m)   Wt 90.7 kg (200 lb)   SpO2 96%   BMI 36.58 kg/m²     PE:  Gen: NAD  Head: normocephalic  Heart: RRR  Lungs: CTA haroon  Abd: NT, ND, soft  Neuro: awake and alert  Skin: intact    IMPRESSION:   LS DJD    Note:  The clinical status was discussed in detail with the patient. The procedure was again discussed and described in detail. All understand and accept the planned procedure and risks; reject other forms of treatment. All questions are answered.     Coreen Willingham MD

## 2021-01-05 NOTE — DISCHARGE INSTRUCTIONS
Radiofrequency Ablation  Discharge Instructions    You had a radiofrequency ablation today. You will probably have some numbness in your lower back area for the next 6-8 hours. You should begin feeling better after a few days, but it may take up to 2 weeks to notice the difference. The benefit you get from your injection will last a variable amount of time, depending on the severity of your spine problem. · Pain:  Most people do not have any increase in pain after this injection. However, you might experience some soreness a few days at the site of the injection. If this happens, putting an ice pack over the sore area will help. · Bandage: You have a small bandage covering the site of the injection. You may remove it when you get home. · Restrictions:  Some one should drive you home after the injection. After that, you have no restrictions. You may resume your normal level of activity. You may take a shower or bath, and you may eat normally. You should continue your current exercise and/or therapy routine. Medications:  Continue your current medications as prescribed. If you pain decreases, you may reduce the amount of your pain medications. If you stopped taking anticoagulants or blood-thinners before the injection, start them tomorrow. Call Dr. Ashlee Cruz at [phone] if you experience:    · Fever (101 degrees Fahrenheit or greater)  · Nausea or vomiting  · Headache unrelieved by your normal pain medication  · Redness or swelling at the injection site that lasts more than 1 day  · New numbness, tingling, weakness, or pain that you didn't have before the injection      If still having pain in 1-2 weeks, call office at [phone] for a follow up appointment.         DISCHARGE SUMMARY from Nurse    The following personal items collected during your admission are returned to you:   Dental Appliance: Dental Appliances: None  Vision: Visual Aid: Contacts, Glasses  Hearing Aid:    Niecy Quale: Clothing:    Other Valuables:    Valuables sent to safe: If you were given prescriptions, please review the written information on prescribed medications. · You will receive a Post Operative Call from one of the Recovery Room Nurses on the day after your surgery to check on you. It is very important for us to know how you are recovering after your surgery. · You may receive an e-mail or letter in the mail from [employer] regarding your experience with us in the Ambulatory Surgery Unit. Your feedback is valuable to us and we appreciate your participation in the survey. If you have not had your influenza or pneumococcal vaccines, please follow up with your primary care physician. The discharge information has been reviewed with the patient. The patient verbalized understanding.

## 2021-01-05 NOTE — PERIOP NOTES
Neuro:  Push/Pull assessment:     LUE Response: equal/strong    LLE Response: equal push/pull   RUE Response: equal/strong    RLE Response: equal push/pull

## 2021-01-05 NOTE — OP NOTES
PROCEDURES PERFORMED:   1. Radiofrequency neuroablation of the medial branches R L2, L3, L4 and L5    PREPROCEDURE DIAGNOSIS: lumbar spondylosis with chronic back pain. POST PROCEDURE DIAGNOSIS: lumbar spondylosis with chronic back pain. SURGEON: Darin Foss M.D. HISTORY OF PRESENT ILLNESS AND INDICATIONS FOR THE PROCEDURE: This patient was previously given diagnostic successful blocks of the facet joints innervated by the aforementioned medial branches and is here today for ablation and neurolysis of those branches. She has previously failed conservative management which has proceeded to injection therapy. FINDINGS AND PROCEDURES:  Adequate informed consent was obtained and the patient was taken to the procedure room and placed in the prone position on the procedure table. A time out was held for patient verification. The patient had monitoring throughout the procedure at cycled one minute blood pressure, pulse, and pulse oximetry. The skin was prepped and draped in the normal sterile fashion. Using fluoroscopic guidance in anteroposterior, oblique, and lateral views, the appropriate superior articular processes and pedicles were identified. After identification 1 percent lidocaine skin anesthesia was administered at each site. Using a radiofrequency ablation needle, a 22 gauge 10 millimeter active tip was placed at the medial branch nerves, right L2,  L3, L4 and L5. The initial impedance in Ohms was within acceptable limits. Each level was subsequently tested on sensory at 50 megahertz with no referred pattern of pain noted at any level. Each level was also tested at 2 Hertz with no distal motor movement or referred pain pattern. The lesion site was injected with 0.5 milliliters of 0.5 percent bupivacaine anesthesia of the nerve branch. Next a lesion was applied for 90 seconds at 80 degrees. The patient tolerated the procedure well with no apparent complications.  The patient was taken to the recovery area for further monitoring. Post procedure neurologic examination was consistent with preprocedure examination. The patient was discharged home ambulatory with family and was given post procedure instructions. FOLLOW-UP: The patient will have a follow-up appointment scheduled by the clinic in the coming weeks.

## 2021-01-05 NOTE — PERIOP NOTES
Patient: Anthony Rodrigues MRN: [id number]  SSN: [id number]   YOB: 1945  Age: 70 y.o. Sex: female     Patient is status post Procedure(s):  RIGHT L2-5 RADIO FREQUENCY ABLATION. Surgeon(s) and Role:     * Nelson Hughes MD - Primary    Local/Dose/Irrigation:  SEE MAR                                 Dressing/Packing:      BANDAID RIGHT BACK.

## 2021-01-05 NOTE — PERIOP NOTES
Omayra Tsai  1945  [id number]    Situation:  Verbal report given from: SILVANA Aparicio RN  Procedure: Procedure(s):  RIGHT L2-5 RADIO FREQUENCY ABLATION    Background:    Preoperative diagnosis: DDD,LUMBAR    Postoperative diagnosis: DDD,LUMBAR    :  Dr. De La Torre      Assessment:  Intra-procedure medications, procedure, and allergies reviewed        Recommendation:    Discharge patient home after discharge instructions reviewed with patient. Rest until local has worn off.

## 2021-01-05 NOTE — PERIOP NOTES
Pt with strong bilateral dorsi and plantar flexion. Injection site without drainage. Pt discharged via wheelchair, accompanied by RN. Pt discharged awake and alert, respirations equal and unlabored, skin warm, dry, and intact. Pt and family members' questions and concerns addressed prior to discharge.

## 2021-01-05 NOTE — PERIOP NOTES
PATIENT DENIES PAIN WHEN ASSESSED BY DR. Neftaly Ramires. PATIENT TOLERATING PROCEDURE WITHOUT COMPLAINT.

## 2021-01-07 ENCOUNTER — OFFICE VISIT (OUTPATIENT)
Dept: FAMILY MEDICINE CLINIC | Age: 76
End: 2021-01-07
Payer: MEDICARE

## 2021-01-07 VITALS
TEMPERATURE: 97.9 F | HEART RATE: 69 BPM | DIASTOLIC BLOOD PRESSURE: 77 MMHG | RESPIRATION RATE: 18 BRPM | OXYGEN SATURATION: 97 % | BODY MASS INDEX: 36.44 KG/M2 | WEIGHT: 198 LBS | SYSTOLIC BLOOD PRESSURE: 152 MMHG | HEIGHT: 62 IN

## 2021-01-07 DIAGNOSIS — M19.011 PRIMARY OSTEOARTHRITIS OF RIGHT SHOULDER: ICD-10-CM

## 2021-01-07 DIAGNOSIS — E11.9 CONTROLLED TYPE 2 DIABETES MELLITUS WITHOUT COMPLICATION, WITHOUT LONG-TERM CURRENT USE OF INSULIN (HCC): ICD-10-CM

## 2021-01-07 DIAGNOSIS — I10 ESSENTIAL HYPERTENSION: ICD-10-CM

## 2021-01-07 DIAGNOSIS — E78.5 HYPERLIPIDEMIA, UNSPECIFIED HYPERLIPIDEMIA TYPE: ICD-10-CM

## 2021-01-07 DIAGNOSIS — Z00.00 MEDICARE ANNUAL WELLNESS VISIT, SUBSEQUENT: Primary | ICD-10-CM

## 2021-01-07 DIAGNOSIS — M19.90 ARTHRITIS: ICD-10-CM

## 2021-01-07 LAB
ALBUMIN UR QL STRIP: 30 MG/L
CREATININE, URINE POC: 50 MG/DL
HBA1C MFR BLD HPLC: 8.9 % (ref 4.8–5.6)
MICROALBUMIN/CREAT RATIO POC: NORMAL MG/G

## 2021-01-07 PROCEDURE — G9899 SCRN MAM PERF RSLTS DOC: HCPCS | Performed by: NURSE PRACTITIONER

## 2021-01-07 PROCEDURE — G0439 PPPS, SUBSEQ VISIT: HCPCS | Performed by: NURSE PRACTITIONER

## 2021-01-07 PROCEDURE — G8536 NO DOC ELDER MAL SCRN: HCPCS | Performed by: NURSE PRACTITIONER

## 2021-01-07 PROCEDURE — G8417 CALC BMI ABV UP PARAM F/U: HCPCS | Performed by: NURSE PRACTITIONER

## 2021-01-07 PROCEDURE — 3052F HG A1C>EQUAL 8.0%<EQUAL 9.0%: CPT | Performed by: NURSE PRACTITIONER

## 2021-01-07 PROCEDURE — G9717 DOC PT DX DEP/BP F/U NT REQ: HCPCS | Performed by: NURSE PRACTITIONER

## 2021-01-07 PROCEDURE — G8399 PT W/DXA RESULTS DOCUMENT: HCPCS | Performed by: NURSE PRACTITIONER

## 2021-01-07 PROCEDURE — 1090F PRES/ABSN URINE INCON ASSESS: CPT | Performed by: NURSE PRACTITIONER

## 2021-01-07 PROCEDURE — 99214 OFFICE O/P EST MOD 30 MIN: CPT | Performed by: NURSE PRACTITIONER

## 2021-01-07 PROCEDURE — 2022F DILAT RTA XM EVC RTNOPTHY: CPT | Performed by: NURSE PRACTITIONER

## 2021-01-07 PROCEDURE — G8427 DOCREV CUR MEDS BY ELIG CLIN: HCPCS | Performed by: NURSE PRACTITIONER

## 2021-01-07 PROCEDURE — G8754 DIAS BP LESS 90: HCPCS | Performed by: NURSE PRACTITIONER

## 2021-01-07 PROCEDURE — 83036 HEMOGLOBIN GLYCOSYLATED A1C: CPT | Performed by: NURSE PRACTITIONER

## 2021-01-07 PROCEDURE — 1101F PT FALLS ASSESS-DOCD LE1/YR: CPT | Performed by: NURSE PRACTITIONER

## 2021-01-07 PROCEDURE — G0463 HOSPITAL OUTPT CLINIC VISIT: HCPCS | Performed by: NURSE PRACTITIONER

## 2021-01-07 PROCEDURE — G8753 SYS BP > OR = 140: HCPCS | Performed by: NURSE PRACTITIONER

## 2021-01-07 PROCEDURE — 3017F COLORECTAL CA SCREEN DOC REV: CPT | Performed by: NURSE PRACTITIONER

## 2021-01-07 PROCEDURE — 82044 UR ALBUMIN SEMIQUANTITATIVE: CPT | Performed by: NURSE PRACTITIONER

## 2021-01-07 RX ORDER — METFORMIN HYDROCHLORIDE 750 MG/1
750 TABLET, EXTENDED RELEASE ORAL 2 TIMES DAILY
Qty: 180 TAB | Refills: 1 | Status: SHIPPED | OUTPATIENT
Start: 2021-01-07 | End: 2021-05-25 | Stop reason: SDUPTHER

## 2021-01-07 RX ORDER — LISINOPRIL 10 MG/1
10 TABLET ORAL DAILY
Qty: 90 TAB | Refills: 1 | Status: SHIPPED | OUTPATIENT
Start: 2021-01-07 | End: 2021-03-25 | Stop reason: SDUPTHER

## 2021-01-07 NOTE — PROGRESS NOTES
Chief Complaint   Patient presents with    Diabetes     follow-up      Health Maintenance reviewed     1. Have you been to the ER, urgent care clinic since your last visit? Hospitalized since your last visit? Yes on file     2. Have you seen or consulted any other health care providers outside of the [address] [place] [address] since your last visit? Include any pap smears or colon screening.   Yes on file

## 2021-01-07 NOTE — PATIENT INSTRUCTIONS
Medicare Wellness Visit, Female The best way to live healthy is to have a lifestyle where you eat a well-balanced diet, exercise regularly, limit alcohol use, and quit all forms of tobacco/nicotine, if applicable. Regular preventive services are another way to keep healthy. Preventive services (vaccines, screening tests, monitoring & exams) can help personalize your care plan, which helps you manage your own care. Screening tests can find health problems at the earliest stages, when they are easiest to treat. [facility] follows the current, evidence-based guidelines published by the [facility] On[facility] ([facility]) when recommending preventive services for our patients. Because we follow these guidelines, sometimes recommendations change over time as research supports it. (For example, mammograms used to be recommended annually. Even though Medicare will still pay for an annual mammogram, the newer guidelines recommend a mammogram every two years for women of average risk). Of course, you and your doctor may decide to screen more often for some diseases, based on your risk and your co-morbidities (chronic disease you are already diagnosed with). Preventive services for you include: - Medicare offers their members a free annual wellness visit, which is time for you and your primary care provider to discuss and plan for your preventive service needs. Take advantage of this benefit every year! 
-All adults over the age of 72 should receive the recommended pneumonia vaccines. Current USPSTF guidelines recommend a series of two vaccines for the best pneumonia protection.  
-All adults should have a flu vaccine yearly and a tetanus vaccine every 10 years.  
-All adults age 48 and older should receive the shingles vaccines (series of two vaccines). -All adults age 38-68 who are overweight should have a diabetes screening test once every three years. -All adults born between [birth year] and 1963 should be screened once for Hepatitis C. 
-Other screening tests and preventive services for persons with diabetes include: an eye exam to screen for diabetic retinopathy, a kidney function test, a foot exam, and stricter control over your cholesterol.  
-Cardiovascular screening for adults with routine risk involves an electrocardiogram (ECG) at intervals determined by your doctor.  
-Colorectal cancer screenings should be done for adults age 54-65 with no increased risk factors for colorectal cancer. There are a number of acceptable methods of screening for this type of cancer. Each test has its own benefits and drawbacks. Discuss with your doctor what is most appropriate for you during your annual wellness visit. The different tests include: colonoscopy (considered the best screening method), a fecal occult blood test, a fecal DNA test, and sigmoidoscopy. 
 
-A bone mass density test is recommended when a woman turns 65 to screen for osteoporosis. This test is only recommended one time, as a screening. Some providers will use this same test as a disease monitoring tool if you already have osteoporosis. -Breast cancer screenings are recommended every other year for women of normal risk, age 54-69. 
-Cervical cancer screenings for women over age 72 are only recommended with certain risk factors. Here is a list of your current Health Maintenance items (your personalized list of preventive services) with a due date: 
Health Maintenance Due Topic Date Due [facility] Eye Exam  11/11/1955  Shingles Vaccine (1 of 2) 11/11/1997  Glaucoma Screening   03/20/2018 [facility] Diabetic Foot Care  10/03/2020 Learning About Diabetes Food Guidelines Your Care Instructions Meal planning is important to manage diabetes. It helps keep your blood sugar at a target level (which you set with your doctor). You don't have to eat special foods. You can eat what your family eats, including sweets once in a while. But you do have to pay attention to how often you eat and how much you eat of certain foods. You may want to work with a dietitian or a certified diabetes educator (CDE) to help you plan meals and snacks. A dietitian or CDE can also help you lose weight if that is one of your goals. What should you know about eating carbs? Managing the amount of carbohydrate (carbs) you eat is an important part of healthy meals when you have diabetes. Carbohydrate is found in many foods. · Learn which foods have carbs. And learn the amounts of carbs in different foods. ? Bread, cereal, pasta, and rice have about 15 grams of carbs in a serving. A serving is 1 slice of bread (1 ounce), ½ cup of cooked cereal, or 1/3 cup of cooked pasta or rice. ? Fruits have 15 grams of carbs in a serving. A serving is 1 small fresh fruit, such as an apple or orange; ½ of a banana; ½ cup of cooked or canned fruit; ½ cup of fruit juice; 1 cup of melon or raspberries; or 2 tablespoons of dried fruit. ? Milk and no-sugar-added yogurt have 15 grams of carbs in a serving. A serving is 1 cup of milk or 2/3 cup of no-sugar-added yogurt. ? Starchy vegetables have 15 grams of carbs in a serving. A serving is ½ cup of mashed potatoes or sweet potato; 1 cup winter squash; ½ of a small baked potato; ½ cup of cooked beans; or ½ cup cooked corn or green peas. · Learn how much carbs to eat each day and at each meal. A dietitian or CDE can teach you how to keep track of the amount of carbs you eat. This is called carbohydrate counting. · If you are not sure how to count carbohydrate grams, use the Plate Method to plan meals. It is a good, quick way to make sure that you have a balanced meal. It also helps you spread carbs throughout the day. ? Divide your plate by types of foods. Put non-starchy vegetables on half the plate, meat or other protein food on one-quarter of the plate, and a grain or starchy vegetable in the final quarter of the plate. To this you can add a small piece of fruit and 1 cup of milk or yogurt, depending on how many carbs you are supposed to eat at a meal. 
· Try to eat about the same amount of carbs at each meal. Do not \"save up\" your daily allowance of carbs to eat at one meal. 
· Proteins have very little or no carbs per serving. Examples of proteins are beef, chicken, turkey, fish, eggs, tofu, cheese, cottage cheese, and peanut butter. A serving size of meat is 3 ounces, which is about the size of a deck of cards. Examples of meat substitute serving sizes (equal to 1 ounce of meat) are 1/4 cup of cottage cheese, 1 egg, 1 tablespoon of peanut butter, and ½ cup of tofu. How can you eat out and still eat healthy? · Learn to estimate the serving sizes of foods that have carbohydrate. If you measure food at home, it will be easier to estimate the amount in a serving of restaurant food. · If the meal you order has too much carbohydrate (such as potatoes, corn, or baked beans), ask to have a low-carbohydrate food instead. Ask for a salad or green vegetables. · If you use insulin, check your blood sugar before and after eating out to help you plan how much to eat in the future. · If you eat more carbohydrate at a meal than you had planned, take a walk or do other exercise. This will help lower your blood sugar. What else should you know? · Limit saturated fat, such as the fat from meat and dairy products. This is a healthy choice because people who have diabetes are at higher risk of heart disease. So choose lean cuts of meat and nonfat or low-fat dairy products. Use olive or canola oil instead of butter or shortening when cooking. · Don't skip meals. Your blood sugar may drop too low if you skip meals and take insulin or certain medicines for diabetes. · Check with your doctor before you drink alcohol. Alcohol can cause your blood sugar to drop too low. Alcohol can also cause a bad reaction if you take certain diabetes medicines. Follow-up care is a key part of your treatment and safety. Be sure to make and go to all appointments, and call your doctor if you are having problems. It's also a good idea to know your test results and keep a list of the medicines you take. Where can you learn more? Go to http://www.gray.com/ Enter G067 in the search box to learn more about \"Learning About Diabetes Food Guidelines. \" Current as of: December 20, 2019               Content Version: 12.6 © 1880-1584 [employer]. Care instructions adapted under license by [employer] (which disclaims liability or warranty for this information). If you have questions about a medical condition or this instruction, always ask your healthcare professional. [address] any warranty or liability for your use of this information. DASH Diet: Care Instructions Your Care Instructions The DASH diet is an eating plan that can help lower your blood pressure. DASH stands for Dietary Approaches to Stop Hypertension. Hypertension is high blood pressure. The DASH diet focuses on eating foods that are high in calcium, potassium, and magnesium. These nutrients can lower blood pressure. The foods that are highest in these nutrients are fruits, vegetables, low-fat dairy products, nuts, seeds, and legumes. But taking calcium, potassium, and magnesium supplements instead of eating foods that are high in those nutrients does not have the same effect. The DASH diet also includes whole grains, fish, and poultry. The DASH diet is one of several lifestyle changes your doctor may recommend to lower your high blood pressure. Your doctor may also want you to decrease the amount of sodium in your diet. Lowering sodium while following the DASH diet can lower blood pressure even further than just the DASH diet alone. Follow-up care is a key part of your treatment and safety. Be sure to make and go to all appointments, and call your doctor if you are having problems. It's also a good idea to know your test results and keep a list of the medicines you take. How can you care for yourself at home? Following the DASH diet · Eat 4 to 5 servings of fruit each day. A serving is 1 medium-sized piece of fruit, ½ cup chopped or canned fruit, 1/4 cup dried fruit, or 4 ounces (½ cup) of fruit juice. Choose fruit more often than fruit juice. · Eat 4 to 5 servings of vegetables each day. A serving is 1 cup of lettuce or raw leafy vegetables, ½ cup of chopped or cooked vegetables, or 4 ounces (½ cup) of vegetable juice. Choose vegetables more often than vegetable juice. · Get 2 to 3 servings of low-fat and fat-free dairy each day. A serving is 8 ounces of milk, 1 cup of yogurt, or 1 ½ ounces of cheese. · Eat 6 to 8 servings of grains each day. A serving is 1 slice of bread, 1 ounce of dry cereal, or ½ cup of cooked rice, pasta, or cooked cereal. Try to choose whole-grain products as much as possible. · Limit lean meat, poultry, and fish to 2 servings each day. A serving is 3 ounces, about the size of a deck of cards. · Eat 4 to 5 servings of nuts, seeds, and legumes (cooked dried beans, lentils, and split peas) each week. A serving is 1/3 cup of nuts, 2 tablespoons of seeds, or ½ cup of cooked beans or peas. · Limit fats and oils to 2 to 3 servings each day. A serving is 1 teaspoon of vegetable oil or 2 tablespoons of salad dressing. · Limit sweets and added sugars to 5 servings or less a week. A serving is 1 tablespoon jelly or jam, ½ cup sorbet, or 1 cup of lemonade. · Eat less than 2,300 milligrams (mg) of sodium a day. If you limit your sodium to 1,500 mg a day, you can lower your blood pressure even more. Tips for success · Start small. Do not try to make dramatic changes to your diet all at once. You might feel that you are missing out on your favorite foods and then be more likely to not follow the plan. Make small changes, and stick with them. Once those changes become habit, add a few more changes. · Try some of the following: ? Make it a goal to eat a fruit or vegetable at every meal and at snacks. This will make it easy to get the recommended amount of fruits and vegetables each day. ? Try yogurt topped with fruit and nuts for a snack or healthy dessert. ? Add lettuce, tomato, cucumber, and onion to sandwiches. ? Combine a ready-made pizza crust with low-fat mozzarella cheese and lots of vegetable toppings. Try using tomatoes, squash, spinach, broccoli, carrots, cauliflower, and onions. ? Have a variety of cut-up vegetables with a low-fat dip as an appetizer instead of chips and dip. ? Sprinkle sunflower seeds or chopped almonds over salads. Or try adding chopped walnuts or almonds to cooked vegetables. ? Try some vegetarian meals using beans and peas. Add garbanzo or kidney beans to salads. Make burritos and tacos with mashed gannon beans or black beans. Where can you learn more? Go to http://www.gray.com/ Enter U623 in the search box to learn more about \"DASH Diet: Care Instructions. \" Current as of: December 16, 2019               Content Version: 12.6 © 4491-8054 [employer]. Care instructions adapted under license by [employer] (which disclaims liability or warranty for this information). If you have questions about a medical condition or this instruction, always ask your healthcare professional. [address] any warranty or liability for your use of this information.

## 2021-01-07 NOTE — PROGRESS NOTES
Chief Complaint   Patient presents with    Diabetes     follow-up          S: Jabari Vargas is a 70 y.o. yo female who presents for DM follow up. Last DM check hemoglobin A1c 7.3  Says that she often misses her metformin because she forgets since it has to take it with meals. Says that some days she forgets both doses and some days she gets one dose. She does not forget her other medications. Blood sugars- checks a few times here and there and her blood sugars were in the 200s  Last eye exam-due soon  Foot exam-no wounds, seen by podiatry. Diet and Exercise- tries to avoid         HTN  BP at home in 150s/70s. No complaints of DAVIDSON or CP. Occasionally misses metoprolol and on those days sometimes has some mild palpitations. Followed by arthritis specialists, taking voltaren and cymbalta and gabapentin. Health Maintenance Reviewed    Denies cardiac complaints including chest pain or discomfort, elevated heart rate, or palpitations. Denies any headache, vision changes, numbness and tingling or weakness in her extremities. Denies respiratory complaints including SOB, difficulty or pain with breathing, wheezes, and cough. Feels well and ROS is otherwise negative. Past Medical History:   Diagnosis Date    ADHD     Adverse effect of anesthesia     hard to put to sleep says patient    Anemia     Arthritis     bilateral shoulders, back.   Sees rheumatology in [place] (Dr. Rodrigo Kraft)    Chronic pain     shoulders, back    Degenerative disc disease, lumbar     [place]    Depression     Diabetes [facility])     Pre diabetic: oral medication    Hypercholesterolemia     Hypertension     Memory difficulties     MRSA (methicillin resistant staph aureus) culture positive 06/04/2018    pos for staph, NOT pos for MRSA, pt was treated, neg cx on 08/2018, pt denies hx of MRSA    Sleep apnea     uses CPAP      Past Surgical History:   Procedure Laterality Date    HX APPENDECTOMY 1999    HX CARPAL TUNNEL RELEASE Bilateral     HX GYN  2008    hysterectomy    HX HIP REPLACEMENT Bilateral     HX HYSTEROSCOPY      HX KNEE REPLACEMENT Bilateral 2002    HX ORTHOPAEDIC  2014    right hip revision    HX ORTHOPAEDIC  2003, 2004    bilateral CTR     Social History     Socioeconomic History    Marital status:      Spouse name: Not on file    Number of children: Not on file    Years of education: Not on file    Highest education level: Not on file   Occupational History    Not on file   Social Needs    Financial resource strain: Not on file    Food insecurity     Worry: Not on file     Inability: Not on file    Transportation needs     Medical: Not on file     Non-medical: Not on file   Tobacco Use    Smoking status: Never Smoker    Smokeless tobacco: Never Used   Substance and Sexual Activity    Alcohol use: No    Drug use: No    Sexual activity: Not on file   Lifestyle    Physical activity     Days per week: Not on file     Minutes per session: Not on file    Stress: Not on file   Relationships    Social connections     Talks on phone: Not on file     Gets together: Not on file     Attends Taoism service: Not on file     Active member of club or organization: Not on file     Attends meetings of clubs or organizations: Not on file     Relationship status: Not on file    Intimate partner violence     Fear of current or ex partner: Not on file     Emotionally abused: Not on file     Physically abused: Not on file     Forced sexual activity: Not on file   Other Topics Concern    Not on file   Social History Narrative    Not on file     Current Outpatient Medications   Medication Sig Dispense Refill    metFORMIN ER (GLUCOPHAGE XR) 750 mg tablet Take 1 Tab by mouth two (2) times a day. 180 Tab 1    diclofenac potassium (CATAFLAM) 50 mg tablet Take 50 mg by mouth two (2) times a day.       glucose blood VI test strips (ASCENSIA AUTODISC VI, ONE TOUCH ULTRA TEST VI) strip Check blood sugar daily. Dx code E11.9 100 Strip 3    metoprolol succinate (Toprol XL) 50 mg XL tablet TAKE 1 TABLET DAILY 90 Tab 4    pantoprazole (Protonix) 20 mg tablet Take 20 mg by mouth daily.  simvastatin (ZOCOR) 20 mg tablet TAKE 1 TABLET NIGHTLY 90 Tab 4    Blood-Glucose Meter monitoring kit Check blood sugar daily. Dx code E11.9 1 Kit 0    lancets misc Check blood sugar daily. Dx code E11.9 100 Each 11    melatonin 3 mg tablet Take 3 mg by mouth nightly as needed.  DULoxetine (CYMBALTA) 30 mg capsule Take 1 cap by mouth every evening. (Patient taking differently: Take 30 mg by mouth every evening. Take 1 cap by mouth every evening.) 90 Cap 1    DULoxetine (CYMBALTA) 60 mg capsule Take 1 cap by mouth every evening. (Patient taking differently: Take 60 mg by mouth daily. Indications: CHRONIC MUSCULOSKELETAL PAIN) 90 Cap 1    gabapentin (NEURONTIN) 300 mg capsule TAKE 1 CAPSULE BY MOUTH 5 TIMES DAILY (Patient taking differently: Indications: pt reports taking 3 tab three times daily) 420 Cap 0    VOLTAREN 1 % gel Apply 2 g to affected area nightly. Bilateral shoulders         Pt is taking all medications as prescribed without any side effects or difficulty. Allergies   Allergen Reactions    Other Food Rash     Citrullus Vulgaris (watermelon)    Sulfa (Sulfonamide Antibiotics) Hives    Zoloft [Sertraline] Hives    Beef Containing Products Other (comments)     Break out in bumps      Dicloxacillin Hives and Itching    Pistachio Nut Unknown (comments)     Per pt was from allergy testing, she is not aware of a reaction      Watermelon Unknown (comments)     Little bumps         Agree with nurses note. O:   Visit Vitals  BP (!) 152/77   Pulse 69   Temp 97.9 °F (36.6 °C) (Temporal)   Resp 18   Ht 5' 2\" (1.575 m)   Wt 198 lb (89.8 kg)   LMP  (LMP Unknown)   SpO2 97%   BMI 36.21 kg/m²      PAIN: No complaints of pain today. GENERAL: Flavia Erickson  is sitting in the chair in NAD. EYE: PERRLA. EOMs intact. Sclera anicteric without injection. RESP: Unlabored without SOB. Speaking in full sentences. Breath sounds are symmetrical bilaterally. Clear to auscultation to all fields. No wheezes. No rales or rhonchi. CV: normal rate. Regular rhythm. S1, S2 audible. No murmur noted. No rubs, clicks or gallops noted. NEURO:  awake, alert and oriented to person, place, and time and event. Clear speech. Muscle strength is +5/5 x 4 extremities. Steady gait. HEME/LYMPH: peripheral pulses palpable 2+ x 4 extremities. Mild trace peripheral edema is noted. FEET: Intact no wounds or abrasions. Denies numbness or tingling. Sensation intact    Results for orders placed or performed in visit on 01/07/21   AMB POC HEMOGLOBIN A1C   Result Value Ref Range    Hemoglobin A1c (POC) 8.9 (A) 4.8 - 5.6 %   AMB POC URINE, MICROALBUMIN, SEMIQUANT (3 RESULTS)   Result Value Ref Range    ALBUMIN, URINE POC 30 Negative mg/L    CREATININE, URINE POC 50 mg/dL    Microalbumin/creat ratio (POC)  <30 MG/G             A/P:  Differential diagnosis and treatment options reviewed with patient who is in agreement with treatment plan as outlined below. ICD-10-CM ICD-9-CM    1. Medicare annual wellness visit, subsequent  Z00.00 V70.0    2. Controlled type 2 diabetes mellitus without complication, without long-term current use of insulin (HCC)  E11.9 250.00 AMB POC HEMOGLOBIN A1C      AMB POC URINE, MICROALBUMIN, SEMIQUANT (3 RESULTS)      metFORMIN ER (GLUCOPHAGE XR) 750 mg tablet       DIABETES FOOT EXAM   3. Hyperlipidemia, unspecified hyperlipidemia type  E78.5 272.4    4. Essential hypertension  I10 401.9 lisinopriL (PRINIVIL, ZESTRIL) 10 mg tablet   5. Primary osteoarthritis of right shoulder  M19.011 715.11    6. Arthritis  M19.90 716.90      BP not at goal.  Add lisinopril 10 mg daily. Follow up 2 months. DM is worsening, not taking medications as prescribed.   Will change to extended release, can take BID with her other medications. Discussed BMI and healthy weight. Encouraged patient to work to implement changes including diet high in raw fruits and vegetables, lean protein and good fats. Limit refined, processed carbohydrates and sugar. Encouraged regular exercise. Advised of frequent feet checks  Advised yearly eye exam  Reviewed warning signs of diabetic emergency, hypertension, stroke and heart attack   continue to follow with arthritis and ortho specialist  Follow up with me in three months. Will do repeat labs then   Verbal and written instructions (see AVS) provided. Patient expresses understanding and agreement of diagnosis and treatment plan. This is the Subsequent Medicare Annual Wellness Exam, performed 12 months or more after the Initial AWV or the last Subsequent AWV    I have reviewed the patient's medical history in detail and updated the computerized patient record. Depression Risk Factor Screening:     3 most recent PHQ Screens 10/3/2019   Little interest or pleasure in doing things Not at all   Feeling down, depressed, irritable, or hopeless Not at all   Total Score PHQ 2 0       Alcohol Risk Screen    Do you average more than 1 drink per night or more than 7 drinks a week:  No    On any one occasion in the past three months have you have had more than 3 drinks containing alcohol:  No        Functional Ability and Level of Safety:    Hearing: The patient needs further evaluation. Activities of Daily Living: The home contains: no safety equipment. Patient does total self care      Ambulation: with no difficulty     Fall Risk:  Fall Risk Assessment, last 12 mths 10/3/2019   Able to walk? Yes   Fall in past 12 months? No   Number of falls in past 12 months -   Fall with injury?  -      Abuse Screen:  Patient is not abused       Cognitive Screening    Has your family/caregiver stated any concerns about your memory: no   patient is concerned that her short term memory is a not as good as it is suppose to be. Feels that she is forgetful often. 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 Cognitive Screening: Normal - MMSE (Mini Mental Status Exam)    Assessment/Plan   Education and counseling provided:  Are appropriate based on today's review and evaluation  Pneumococcal Vaccine  Influenza Vaccine  Screening Mammography  Screening Pap and pelvic (covered once every 2 years)  Colorectal cancer screening tests  Bone mass measurement (DEXA)  Screening for glaucoma  Medical nutrition therapy for individuals with diabetes or renal disease    Diagnoses and all orders for this visit:    1. Controlled type 2 diabetes mellitus without complication, without long-term current use of insulin (HCC)  -     AMB POC HEMOGLOBIN A1C  -     AMB POC URINE, MICROALBUMIN, SEMIQUANT (3 RESULTS)  -     metFORMIN ER (GLUCOPHAGE XR) 750 mg tablet; Take 1 Tab by mouth two (2) times a day. -      DIABETES FOOT EXAM    2. Medicare annual wellness visit, subsequent        Health Maintenance Due     Health Maintenance Due   Topic Date Due    Eye Exam Retinal or Dilated  11/11/1955    Shingrix Vaccine Age 50> (1 of 2) 11/11/1997    GLAUCOMA SCREENING Q2Y  03/20/2018       Patient Care Team   Patient Care Team:  Lex Chowdhury NP as PCP - General (Pediatric Medicine)  Lex Chowdhury NP as PCP - Merary Morris Provider    History     Patient Active Problem List   Diagnosis Code    Hyperlipidemia E78.5    Hypertension I10    Depression F32.9    Arthritis M19.90    Degenerative disc disease, lumbar M51.36    Hypercholesterolemia E78.00    Obesity, morbid ([address] E66.01    Primary osteoarthritis of right shoulder M19.011    Attention deficit disorder (ADD) in adult F98.8     Past Medical History:   Diagnosis Date    ADHD     Adverse effect of anesthesia     hard to put to sleep says patient    Anemia     Arthritis     bilateral shoulders, back.   Sees rheumatology in [place] (Dr. Favio Hartman)    Chronic pain     shoulders, back    Degenerative disc disease, lumbar     [place], Dr. Favio Hartman (RA physician)    Depression     Diabetes [facility])     Pre diabetic: oral medication    Hypercholesterolemia     Hypertension     Memory difficulties     MRSA (methicillin resistant staph aureus) culture positive 06/04/2018    pos for staph, NOT pos for MRSA, pt was treated, neg cx on 08/2018, pt denies hx of MRSA    Sleep apnea     uses CPAP      Past Surgical History:   Procedure Laterality Date    HX APPENDECTOMY  1999    HX CARPAL TUNNEL RELEASE Bilateral     HX GYN  2008    hysterectomy    HX HIP REPLACEMENT Bilateral     HX HYSTEROSCOPY      HX KNEE REPLACEMENT Bilateral 2002    HX ORTHOPAEDIC  2014    right hip revision    HX ORTHOPAEDIC  2003, 2004    bilateral CTR     Current Outpatient Medications   Medication Sig Dispense Refill    metFORMIN ER (GLUCOPHAGE XR) 750 mg tablet Take 1 Tab by mouth two (2) times a day. 180 Tab 1    diclofenac potassium (CATAFLAM) 50 mg tablet Take 50 mg by mouth two (2) times a day.  glucose blood VI test strips (ASCENSIA AUTODISC VI, ONE TOUCH ULTRA TEST VI) strip Check blood sugar daily. Dx code E11.9 100 Strip 3    metoprolol succinate (Toprol XL) 50 mg XL tablet TAKE 1 TABLET DAILY 90 Tab 4    pantoprazole (Protonix) 20 mg tablet Take 20 mg by mouth daily.  simvastatin (ZOCOR) 20 mg tablet TAKE 1 TABLET NIGHTLY 90 Tab 4    Blood-Glucose Meter monitoring kit Check blood sugar daily. Dx code E11.9 1 Kit 0    lancets misc Check blood sugar daily. Dx code E11.9 100 Each 11    melatonin 3 mg tablet Take 3 mg by mouth nightly as needed.  DULoxetine (CYMBALTA) 30 mg capsule Take 1 cap by mouth every evening. (Patient taking differently: Take 30 mg by mouth every evening. Take 1 cap by mouth every evening.) 90 Cap 1    DULoxetine (CYMBALTA) 60 mg capsule Take 1 cap by mouth every evening.  (Patient taking differently: Take 60 mg by mouth daily. Indications: CHRONIC MUSCULOSKELETAL PAIN) 90 Cap 1    gabapentin (NEURONTIN) 300 mg capsule TAKE 1 CAPSULE BY MOUTH 5 TIMES DAILY (Patient taking differently: Indications: pt reports taking 3 tab three times daily) 420 Cap 0    VOLTAREN 1 % gel Apply 2 g to affected area nightly. Bilateral shoulders       Allergies   Allergen Reactions    Other Food Rash     Citrullus Vulgaris (watermelon)    Sulfa (Sulfonamide Antibiotics) Hives    Zoloft [Sertraline] Hives    Beef Containing Products Other (comments)     Break out in bumps      Dicloxacillin Hives and Itching    Pistachio Nut Unknown (comments)     Per pt was from allergy testing, she is not aware of a reaction      Watermelon Unknown (comments)     Little bumps       No family history on file. Social History     Tobacco Use    Smoking status: Never Smoker    Smokeless tobacco: Never Used   Substance Use Topics    Alcohol use:  No

## 2021-01-13 ENCOUNTER — TRANSCRIBE ORDER (OUTPATIENT)
Dept: SCHEDULING | Age: 76
End: 2021-01-13

## 2021-01-13 DIAGNOSIS — M51.36 DDD (DEGENERATIVE DISC DISEASE), LUMBAR: Primary | ICD-10-CM

## 2021-01-17 ENCOUNTER — HOSPITAL ENCOUNTER (OUTPATIENT)
Dept: MRI IMAGING | Age: 76
Discharge: HOME | End: 2021-01-17
Attending: PHYSICAL MEDICINE & REHABILITATION
Payer: MEDICARE

## 2021-01-17 DIAGNOSIS — M51.36 DDD (DEGENERATIVE DISC DISEASE), LUMBAR: ICD-10-CM

## 2021-01-17 PROCEDURE — 72148 MRI LUMBAR SPINE W/O DYE: CPT

## 2021-03-19 ENCOUNTER — TELEPHONE (OUTPATIENT)
Dept: FAMILY MEDICINE CLINIC | Age: 76
End: 2021-03-19

## 2021-03-19 DIAGNOSIS — E11.9 CONTROLLED TYPE 2 DIABETES MELLITUS WITHOUT COMPLICATION, WITHOUT LONG-TERM CURRENT USE OF INSULIN (HCC): ICD-10-CM

## 2021-03-19 RX ORDER — NYSTATIN 100000 U/G
CREAM TOPICAL 2 TIMES DAILY
Qty: 60 G | Refills: 0 | Status: SHIPPED | OUTPATIENT
Start: 2021-03-19 | End: 2021-06-23 | Stop reason: ALTCHOICE

## 2021-03-19 NOTE — TELEPHONE ENCOUNTER
Minna is requesting a refill on med    Requested Prescriptions     Pending Prescriptions Disp Refills    nystatin (MYCOSTATIN) topical cream 60 g 0     Sig: Apply  to affected area two (2) times a day.

## 2021-03-19 NOTE — TELEPHONE ENCOUNTER
Hemanth Harkins   Key: [id number] -   Rx #: [id number]   Outcome Additional Information Required   This medication may be excluded from the patient's benefit. For more information, please reach out to Treater directly at [phone].  Message from Treater: Drug is not covered by plan   Drug OneTouch Ultra strips   Form  Electronic PA Form

## 2021-03-19 NOTE — TELEPHONE ENCOUNTER
Requested Prescriptions     Pending Prescriptions Disp Refills    glucose blood VI test strips (ASCENSIA AUTODISC VI, ONE TOUCH ULTRA TEST VI) strip 100 Strip 3     Sig: Check blood sugar daily.   Dx code E11.9

## 2021-03-24 ENCOUNTER — DOCUMENTATION ONLY (OUTPATIENT)
Dept: FAMILY MEDICINE CLINIC | Age: 76
End: 2021-03-24

## 2021-03-25 DIAGNOSIS — I10 ESSENTIAL HYPERTENSION: ICD-10-CM

## 2021-03-25 RX ORDER — LISINOPRIL 20 MG/1
20 TABLET ORAL DAILY
Qty: 90 TAB | Refills: 1 | Status: SHIPPED | OUTPATIENT
Start: 2021-03-25 | End: 2021-07-12 | Stop reason: SDUPTHER

## 2021-03-31 NOTE — TELEPHONE ENCOUNTER
Patient calling in refill to [facility].  She can be reached at either [phone] or [phone] No

## 2021-05-11 ENCOUNTER — OFFICE VISIT (OUTPATIENT)
Dept: FAMILY MEDICINE CLINIC | Age: 76
End: 2021-05-11
Payer: MEDICARE

## 2021-05-11 VITALS
WEIGHT: 197.8 LBS | OXYGEN SATURATION: 97 % | RESPIRATION RATE: 16 BRPM | TEMPERATURE: 98.7 F | SYSTOLIC BLOOD PRESSURE: 133 MMHG | HEART RATE: 66 BPM | HEIGHT: 62 IN | BODY MASS INDEX: 36.4 KG/M2 | DIASTOLIC BLOOD PRESSURE: 80 MMHG

## 2021-05-11 DIAGNOSIS — Z01.818 PRE-OP EVALUATION: Primary | ICD-10-CM

## 2021-05-11 DIAGNOSIS — M19.012 PRIMARY OSTEOARTHRITIS OF LEFT SHOULDER: ICD-10-CM

## 2021-05-11 DIAGNOSIS — E11.9 CONTROLLED TYPE 2 DIABETES MELLITUS WITHOUT COMPLICATION, WITHOUT LONG-TERM CURRENT USE OF INSULIN (HCC): ICD-10-CM

## 2021-05-11 DIAGNOSIS — E66.01 OBESITY, MORBID (HCC): ICD-10-CM

## 2021-05-11 LAB — HBA1C MFR BLD HPLC: 8.2 %

## 2021-05-11 PROCEDURE — G9717 DOC PT DX DEP/BP F/U NT REQ: HCPCS | Performed by: NURSE PRACTITIONER

## 2021-05-11 PROCEDURE — G0463 HOSPITAL OUTPT CLINIC VISIT: HCPCS | Performed by: NURSE PRACTITIONER

## 2021-05-11 PROCEDURE — 2022F DILAT RTA XM EVC RTNOPTHY: CPT | Performed by: NURSE PRACTITIONER

## 2021-05-11 PROCEDURE — 3017F COLORECTAL CA SCREEN DOC REV: CPT | Performed by: NURSE PRACTITIONER

## 2021-05-11 PROCEDURE — 83036 HEMOGLOBIN GLYCOSYLATED A1C: CPT | Performed by: NURSE PRACTITIONER

## 2021-05-11 PROCEDURE — G8417 CALC BMI ABV UP PARAM F/U: HCPCS | Performed by: NURSE PRACTITIONER

## 2021-05-11 PROCEDURE — G8427 DOCREV CUR MEDS BY ELIG CLIN: HCPCS | Performed by: NURSE PRACTITIONER

## 2021-05-11 PROCEDURE — G8754 DIAS BP LESS 90: HCPCS | Performed by: NURSE PRACTITIONER

## 2021-05-11 PROCEDURE — 1101F PT FALLS ASSESS-DOCD LE1/YR: CPT | Performed by: NURSE PRACTITIONER

## 2021-05-11 PROCEDURE — 99213 OFFICE O/P EST LOW 20 MIN: CPT | Performed by: NURSE PRACTITIONER

## 2021-05-11 PROCEDURE — 1090F PRES/ABSN URINE INCON ASSESS: CPT | Performed by: NURSE PRACTITIONER

## 2021-05-11 PROCEDURE — 3052F HG A1C>EQUAL 8.0%<EQUAL 9.0%: CPT | Performed by: NURSE PRACTITIONER

## 2021-05-11 PROCEDURE — G9899 SCRN MAM PERF RSLTS DOC: HCPCS | Performed by: NURSE PRACTITIONER

## 2021-05-11 PROCEDURE — G8536 NO DOC ELDER MAL SCRN: HCPCS | Performed by: NURSE PRACTITIONER

## 2021-05-11 PROCEDURE — G8752 SYS BP LESS 140: HCPCS | Performed by: NURSE PRACTITIONER

## 2021-05-11 PROCEDURE — G8399 PT W/DXA RESULTS DOCUMENT: HCPCS | Performed by: NURSE PRACTITIONER

## 2021-05-11 NOTE — PATIENT INSTRUCTIONS

## 2021-05-11 NOTE — PROGRESS NOTES
Chief Complaint   Patient presents with    Pre-op Exam         HPI:  Vasquez Freeman is 70 y.o. female (1945) who presents for preoperative evaluation. Procedure/Surgery: left shoulder reverse total replacement    Date of Procedure/Surgery:5/21/21   Surgeon: Dr Ni Dsouza: [facility] in [place]  Primary Physician: Muriel Dyson NP       Reason for surgery: pain and arthritis in left shoulder    Latex Allergy: none known  Recent use of: NSAIDs (mobic)  Tetanus up to date: last tetanus booster within 10 years  Anesthesia Complications: Yes: Personal Hx \"hard to put to sleep\". History of abnormal bleeding : None  History of Blood Transfusions: no      [facility] to [facility] done today at [facility] per patient. DM  Not checking blood sugars. Taking medication as directed. Last HgbA1c 8.9 1/7/21  No new numbness or tingling. No polyuria or polydipsia       Has had increased stressors over the past month because her  passed away last month. Says she is doing okay but feeling sad and lonely at times. RUBÉN  Wears CPAP at night.        ---------------------------------------     Prior to Admission medications    Medication Sig Start Date End Date Taking? Authorizing Provider   lisinopriL (PRINIVIL, ZESTRIL) 20 mg tablet Take 1 Tab by mouth daily. 3/25/21   Rose Hernandez NP   nystatin (MYCOSTATIN) topical cream Apply  to affected area two (2) times a day. 3/19/21   Rose Hernandez NP   glucose blood VI test strips (ASCENSIA AUTODISC VI, ONE TOUCH ULTRA TEST VI) strip Check blood sugar daily. Dx code E11.9 3/19/21   Rose Hernandez NP   metFORMIN ER (GLUCOPHAGE XR) 750 mg tablet Take 1 Tab by mouth two (2) times a day. 1/7/21   Rose Hernandez NP   diclofenac potassium (CATAFLAM) 50 mg tablet Take 50 mg by mouth two (2) times a day.     Provider, Historical   metoprolol succinate (Toprol XL) 50 mg XL tablet TAKE 1 TABLET DAILY 9/16/20   Jose Eduardo Hernandez NP pantoprazole (Protonix) 20 mg tablet Take 20 mg by mouth daily. Provider, Historical   simvastatin (ZOCOR) 20 mg tablet TAKE 1 TABLET NIGHTLY 2/5/20   Nicole Frankel MD   Blood-Glucose Meter monitoring kit Check blood sugar daily. Dx code E11.9 10/3/19   Rose Hernandez NP   lancets misc Check blood sugar daily. Dx code E11.9 10/3/19   Rose Hernandez NP   melatonin 3 mg tablet Take 3 mg by mouth nightly as needed. Provider, Historical   DULoxetine (CYMBALTA) 30 mg capsule Take 1 cap by mouth every evening. Patient taking differently: Take 30 mg by mouth every evening. Take 1 cap by mouth every evening. 6/25/18   Campos Dale MD   DULoxetine (CYMBALTA) 60 mg capsule Take 1 cap by mouth every evening. Patient taking differently: Take 60 mg by mouth daily. Indications: CHRONIC MUSCULOSKELETAL PAIN 6/25/18   Campos Dale MD   gabapentin (NEURONTIN) 300 mg capsule TAKE 1 CAPSULE BY MOUTH 5 TIMES DAILY  Patient taking differently: Indications: pt reports taking 3 tab three times daily 5/8/18   Campos Dale MD   VOLTAREN 1 % gel Apply 2 g to affected area nightly.  Bilateral shoulders 10/26/16   Provider, Historical        Allergies   Allergen Reactions    Other Food Rash     Citrullus Vulgaris (watermelon)    Sulfa (Sulfonamide Antibiotics) Hives    Zoloft [Sertraline] Hives    Beef Containing Products Other (comments)     Break out in bumps      Dicloxacillin Hives and Itching    Pistachio Nut Unknown (comments)     Per pt was from allergy testing, she is not aware of a reaction      Watermelon Unknown (comments)     Little bumps          Patient Active Problem List    Diagnosis Date Noted    Attention deficit disorder (ADD) in adult 01/25/2019    Primary osteoarthritis of right shoulder 06/13/2018    Obesity, morbid ([facility] [address] 02/28/2018    Hypercholesterolemia     Degenerative disc disease, lumbar     Hyperlipidemia 01/18/2017    Hypertension     Depression     Arthritis Past Medical History:   Diagnosis Date    ADHD     Adverse effect of anesthesia     hard to put to sleep says patient    Anemia     Arthritis     bilateral shoulders, back.   Sees rheumatology in [place] (Dr. Luzma Ballesteros)    Chronic pain     shoulders, back    Degenerative disc disease, lumbar     [place] Lu[place] ([place] physician)    Depression     Diabetes [facility])     Pre diabetic: oral medication    Hypercholesterolemia     Hypertension     Memory difficulties     MRSA (methicillin resistant staph aureus) culture positive 06/04/2018    pos for staph, NOT pos for MRSA, pt was treated, neg cx on 08/2018, pt denies hx of MRSA    Sleep apnea     uses CPAP       Past Surgical History:   Procedure Laterality Date    HX APPENDECTOMY  1999    HX CARPAL TUNNEL RELEASE Bilateral     HX GYN  2008    hysterectomy    HX HIP REPLACEMENT Bilateral     HX HYSTEROSCOPY      HX KNEE REPLACEMENT Bilateral 2002    HX ORTHOPAEDIC  2014    right hip revision    HX ORTHOPAEDIC  2003, 2004    bilateral CTR       Social History     Tobacco Use    Smoking status: Never Smoker    Smokeless tobacco: Never Used   Substance Use Topics    Alcohol use: No         --------------------------------------     ROS  ROS:  Gen: no fatigue, fever, chills  Eyes: no excessive tearing, itching, or discharge  Nose: no rhinorrhea, no sinus pain  Mouth: no oral lesions, no sore throat  Resp: no shortness of breath, no wheezing, no cough  CV: no chest pain, no paroxysmal nocturnal dyspnea  Abd: no nausea, no heartburn, no diarrhea, no constipation, no abdominal pain  Neuro: no headaches, no syncope or presyncopal episodes  Endo: no polyuria, no polydipsia  Heme: no lymphadenopathy, no easy bruising or bleeding        Physical Exam   Visit Vitals  /80 (BP 1 Location: Left upper arm, BP Patient Position: Sitting, BP Cuff Size: Large adult)   Pulse 66   Temp 98.7 °F (37.1 °C) (Oral)   Resp 16   Ht 5' 2\" (1.575 m)   Wt 197 lb 12.8 oz (89.7 kg)   LMP  (LMP Unknown)   SpO2 97%   BMI 36.18 kg/m²   Gen: alert, oriented, no acute distress  Head: normocephalic, atraumatic  Ears: external auditory canals clear, TMs without erythema or effusion  Eyes: pupils equal round reactive to light, sclera clear, conjunctiva clear  Nose: normal turbinates, no rhinorrhea  Oral: moist mucus membranes, no oral lesions, no pharyngeal inflammation or exudate  Neck: supple, no lymphadenopathy  Resp: no increased work of breathing, lungs clear to ausculation bilaterally  CV: S1, S2 normal, no murmurs, rubs, or gallops. Abd: soft, not tender, not distended. No hepatosplenomegaly. Normal bowel sounds. No hernias. Neuro: cranial nerves intact, normal strength and movement in all extremities, and sensation intact and symmetric. Skin: no lesion or rash                Assessment & Plan:  Differential diagnosis and treatment options reviewed with patient who is in agreement with treatment plan as outlined below. ICD-10-CM ICD-9-CM    1. Pre-op evaluation  Z01.818 V72.84    2. Controlled type 2 diabetes mellitus without complication, without long-term current use of insulin (HCC)  E11.9 250.00 AMB POC HEMOGLOBIN A1C   3. Primary osteoarthritis of left shoulder  M19.012 715.11    4. Obesity, morbid ([facility] [address]  E66.01 278.01      PAT to be done today. Will see what her labs, EKG and CXR show after they result. HgbA1c a little better but still not at goal.  Patient admits that she has not been following diet. Will work on diet and increase water. Was taking mobic and voltaren, I told her no more voltaren. Needs to clear with ortho if she is okay to stay on mobic prior to surgery. Discussed BMI and healthy weight. Encouraged patient to work to implement changes including diet high in raw fruits and vegetables, lean protein and good fats. Limit refined, processed carbohydrates and sugar. Encouraged regular exercise.      After reviewing the patient's history & exam she is moderate risk for cardiac complications. Surgical clearance pending labs and EKG and CXR  Follow up in one month   Verbal and written instructions (see AVS) provided. Patient expresses understanding and agreement of diagnosis and treatment plan.

## 2021-05-11 NOTE — PROGRESS NOTES
Maris Marshall is a 70 y.o. female  Chief Complaint   Patient presents with    Pre-op Exam     Health Maintenance Due   Topic Date Due    Eye Exam Retinal or Dilated  Never done    COVID-19 Vaccine (1) Never done    Shingrix Vaccine Age 50> (1 of 2) Never done    Colorectal Cancer Screening Combo  04/27/2021     Visit Vitals  /80 (BP 1 Location: Left upper arm, BP Patient Position: Sitting, BP Cuff Size: Large adult)   Pulse 66   Temp 98.7 °F (37.1 °C) (Oral)   Resp 16   Ht 5' 2\" (1.575 m)   Wt 197 lb 12.8 oz (89.7 kg)   SpO2 97%   BMI 36.18 kg/m²     1. Have you been to the ER, urgent care clinic since your last visit? Hospitalized since your last visit? no    2. Have you seen or consulted any other health care providers outside of the [address] [place] [address] since your last visit? Include any pap smears or colon screening.  No  .Chon Cantrellh

## 2021-05-18 ENCOUNTER — DOCUMENTATION ONLY (OUTPATIENT)
Dept: FAMILY MEDICINE CLINIC | Age: 76
End: 2021-05-18

## 2021-05-25 DIAGNOSIS — E11.9 CONTROLLED TYPE 2 DIABETES MELLITUS WITHOUT COMPLICATION, WITHOUT LONG-TERM CURRENT USE OF INSULIN (HCC): ICD-10-CM

## 2021-05-25 RX ORDER — METFORMIN HYDROCHLORIDE 750 MG/1
750 TABLET, EXTENDED RELEASE ORAL 2 TIMES DAILY
Qty: 180 TABLET | Refills: 1 | Status: SHIPPED | OUTPATIENT
Start: 2021-05-25 | End: 2021-06-23 | Stop reason: DRUGHIGH

## 2021-05-25 NOTE — TELEPHONE ENCOUNTER
Received fax from [email or web address] in [facility] for refill of:    Requested Prescriptions     Pending Prescriptions Disp Refills    metFORMIN ER (GLUCOPHAGE XR) 750 mg tablet 180 Tablet 1     Sig: Take 1 Tablet by mouth two (2) times a day.

## 2021-06-23 ENCOUNTER — OFFICE VISIT (OUTPATIENT)
Dept: FAMILY MEDICINE CLINIC | Age: 76
End: 2021-06-23
Payer: MEDICARE

## 2021-06-23 VITALS
TEMPERATURE: 98.4 F | SYSTOLIC BLOOD PRESSURE: 132 MMHG | RESPIRATION RATE: 16 BRPM | BODY MASS INDEX: 35.85 KG/M2 | HEART RATE: 65 BPM | WEIGHT: 194.8 LBS | OXYGEN SATURATION: 97 % | DIASTOLIC BLOOD PRESSURE: 84 MMHG | HEIGHT: 62 IN

## 2021-06-23 DIAGNOSIS — Z12.11 SPECIAL SCREENING FOR MALIGNANT NEOPLASMS, COLON: ICD-10-CM

## 2021-06-23 DIAGNOSIS — E66.01 SEVERE OBESITY (BMI 35.0-35.9 WITH COMORBIDITY) (HCC): ICD-10-CM

## 2021-06-23 DIAGNOSIS — E11.9 CONTROLLED TYPE 2 DIABETES MELLITUS WITHOUT COMPLICATION, WITHOUT LONG-TERM CURRENT USE OF INSULIN (HCC): Primary | ICD-10-CM

## 2021-06-23 DIAGNOSIS — I10 ESSENTIAL HYPERTENSION: ICD-10-CM

## 2021-06-23 PROCEDURE — G8417 CALC BMI ABV UP PARAM F/U: HCPCS | Performed by: NURSE PRACTITIONER

## 2021-06-23 PROCEDURE — G9717 DOC PT DX DEP/BP F/U NT REQ: HCPCS | Performed by: NURSE PRACTITIONER

## 2021-06-23 PROCEDURE — 3052F HG A1C>EQUAL 8.0%<EQUAL 9.0%: CPT | Performed by: NURSE PRACTITIONER

## 2021-06-23 PROCEDURE — 1090F PRES/ABSN URINE INCON ASSESS: CPT | Performed by: NURSE PRACTITIONER

## 2021-06-23 PROCEDURE — G8536 NO DOC ELDER MAL SCRN: HCPCS | Performed by: NURSE PRACTITIONER

## 2021-06-23 PROCEDURE — 99214 OFFICE O/P EST MOD 30 MIN: CPT | Performed by: NURSE PRACTITIONER

## 2021-06-23 PROCEDURE — G8399 PT W/DXA RESULTS DOCUMENT: HCPCS | Performed by: NURSE PRACTITIONER

## 2021-06-23 PROCEDURE — G8427 DOCREV CUR MEDS BY ELIG CLIN: HCPCS | Performed by: NURSE PRACTITIONER

## 2021-06-23 PROCEDURE — G8754 DIAS BP LESS 90: HCPCS | Performed by: NURSE PRACTITIONER

## 2021-06-23 PROCEDURE — G9899 SCRN MAM PERF RSLTS DOC: HCPCS | Performed by: NURSE PRACTITIONER

## 2021-06-23 PROCEDURE — 3017F COLORECTAL CA SCREEN DOC REV: CPT | Performed by: NURSE PRACTITIONER

## 2021-06-23 PROCEDURE — G8752 SYS BP LESS 140: HCPCS | Performed by: NURSE PRACTITIONER

## 2021-06-23 PROCEDURE — 1101F PT FALLS ASSESS-DOCD LE1/YR: CPT | Performed by: NURSE PRACTITIONER

## 2021-06-23 PROCEDURE — G0463 HOSPITAL OUTPT CLINIC VISIT: HCPCS | Performed by: NURSE PRACTITIONER

## 2021-06-23 PROCEDURE — 2022F DILAT RTA XM EVC RTNOPTHY: CPT | Performed by: NURSE PRACTITIONER

## 2021-06-23 RX ORDER — MELOXICAM 15 MG/1
15 TABLET ORAL
COMMUNITY
Start: 2021-06-20

## 2021-06-23 RX ORDER — METFORMIN HYDROCHLORIDE 500 MG/1
1000 TABLET, EXTENDED RELEASE ORAL 2 TIMES DAILY
Qty: 360 TABLET | Refills: 1 | Status: SHIPPED | OUTPATIENT
Start: 2021-06-23 | End: 2021-12-15

## 2021-06-23 NOTE — PATIENT INSTRUCTIONS

## 2021-06-23 NOTE — PROGRESS NOTES
1. Have you been to the ER, urgent care clinic since your last visit? Hospitalized since your last visit? No    2. Have you seen or consulted any other health care providers outside of the [address] [place] [address] since your last visit? Include any pap smears or colon screening. Yes, Ophthalmology and Orthopedics for shoulder.     Health Maintenance Due   Topic Date Due    Eye Exam Retinal or Dilated  Never done    Shingrix Vaccine Age 50> (1 of 2) Never done    Colorectal Cancer Screening Combo  04/27/2021     Chief Complaint   Patient presents with    Blood sugar problem     A1C Check

## 2021-06-23 NOTE — PROGRESS NOTES
Chief Complaint   Patient presents with    Blood sugar problem     A1C Check       S: Paty St is a 70 y.o. yo female who presents for DM follow up. Last DM check hemoglobin A1c on 5/11/2021 was 8.2  Blood sugars in -170  Diet and Exercise- working on diet now, has lost about 4-5 lbs. Cutting down on carbs a lot. FEET- no wounds, has chronic right swelling with arch deformity. Recovering from left shoulder surgery on 5/21/21, doing really well. Had home health initially and then starts this week at [facility]. Thinking about getting right shoulder done in September. Health Maintenance Reviewed    Denies cardiac complaints including chest pain or discomfort, elevated heart rate, or palpitations. Denies any headache, vision changes, numbness and tingling or weakness in her extremities. Denies respiratory complaints including SOB, difficulty or pain with breathing, wheezes, and cough. Feels well and ROS is otherwise negative. Past Medical History:   Diagnosis Date    ADHD     Adverse effect of anesthesia     hard to put to sleep says patient    Anemia     Arthritis     bilateral shoulders, back.   Sees rheumatology in [place] (Dr. Rand Cotton)    Chronic pain     shoulders, back    Degenerative disc disease, lumbar     [place], Dr. Rand Cotton (RA physician)    Depression     Diabetes [facility])     Pre diabetic: oral medication    Hypercholesterolemia     Hypertension     Memory difficulties     MRSA (methicillin resistant staph aureus) culture positive 06/04/2018    pos for staph, NOT pos for MRSA, pt was treated, neg cx on 08/2018, pt denies hx of MRSA    Sleep apnea     uses CPAP      Past Surgical History:   Procedure Laterality Date    HX APPENDECTOMY  1999    HX CARPAL TUNNEL RELEASE Bilateral     HX GYN  2008    hysterectomy    HX HIP REPLACEMENT Bilateral     HX HYSTEROSCOPY      HX KNEE REPLACEMENT Bilateral 2002    HX ORTHOPAEDIC  2014    right hip revision    HX ORTHOPAEDIC  2003, 2004    bilateral CTR    HX SHOULDER REPLACEMENT Left 05/21/2021     Social History     Socioeconomic History    Marital status:      Spouse name: Not on file    Number of children: Not on file    Years of education: Not on file    Highest education level: Not on file   Occupational History    Not on file   Tobacco Use    Smoking status: Never Smoker    Smokeless tobacco: Never Used   Substance and Sexual Activity    Alcohol use: No    Drug use: No    Sexual activity: Not on file   Other Topics Concern    Not on file   Social History Narrative    Not on file     Social Determinants of Health     Financial Resource Strain:     Difficulty of Paying Living Expenses:    Food Insecurity:     Worried About Running Out of Food in the Last Year:     Ran Out of Food in the Last Year:    Transportation Needs:     Lack of Transportation (Medical):  Lack of Transportation (Non-Medical):    Physical Activity:     Days of Exercise per Week:     Minutes of Exercise per Session:    Stress:     Feeling of Stress :    Social Connections:     Frequency of Communication with Friends and Family:     Frequency of Social Gatherings with Friends and Family:     Attends [employer]oration[employer] Services:     Active Member of Clubs or Organizations:     Attends Club or Organization Meetings:     Marital Status:    Intimate Partner Violence:     Fear of Current or Ex-Partner:     Emotionally Abused:     Physically Abused:     Sexually Abused:      Current Outpatient Medications   Medication Sig Dispense Refill    meloxicam (MOBIC) 15 mg tablet 15 mg.      metFORMIN ER (GLUCOPHAGE XR) 750 mg tablet Take 1 Tablet by mouth two (2) times a day. 180 Tablet 1    lisinopriL (PRINIVIL, ZESTRIL) 20 mg tablet Take 1 Tab by mouth daily. 90 Tab 1    glucose blood VI test strips (ASCENSIA AUTODISC VI, ONE TOUCH ULTRA TEST VI) strip Check blood sugar daily.   Dx code E11.9 100 Strip 3    diclofenac potassium (CATAFLAM) 50 mg tablet Take 50 mg by mouth two (2) times a day.  metoprolol succinate (Toprol XL) 50 mg XL tablet TAKE 1 TABLET DAILY 90 Tab 4    pantoprazole (Protonix) 20 mg tablet Take 20 mg by mouth daily.  simvastatin (ZOCOR) 20 mg tablet TAKE 1 TABLET NIGHTLY 90 Tab 4    Blood-Glucose Meter monitoring kit Check blood sugar daily. Dx code E11.9 1 Kit 0    lancets misc Check blood sugar daily. Dx code E11.9 100 Each 11    DULoxetine (CYMBALTA) 30 mg capsule Take 1 cap by mouth every evening. (Patient taking differently: Take 30 mg by mouth every evening. Take 1 cap by mouth every evening.) 90 Cap 1    DULoxetine (CYMBALTA) 60 mg capsule Take 1 cap by mouth every evening. (Patient taking differently: Take 60 mg by mouth daily. Indications: CHRONIC MUSCULOSKELETAL PAIN) 90 Cap 1    gabapentin (NEURONTIN) 300 mg capsule TAKE 1 CAPSULE BY MOUTH 5 TIMES DAILY (Patient taking differently: Indications: pt reports taking 3 tab three times daily) 420 Cap 0    nystatin (MYCOSTATIN) topical cream Apply  to affected area two (2) times a day. (Patient not taking: Reported on 6/23/2021) 60 g 0    melatonin 3 mg tablet Take 3 mg by mouth nightly as needed. (Patient not taking: Reported on 6/23/2021)      VOLTAREN 1 % gel Apply 2 g to affected area nightly. Bilateral shoulders (Patient not taking: Reported on 6/23/2021)         Pt is taking all medications as prescribed without any side effects or difficulty. Allergies   Allergen Reactions    Other Food Rash     Citrullus Vulgaris (watermelon)    Sulfa (Sulfonamide Antibiotics) Hives    Zoloft [Sertraline] Hives    Beef Containing Products Other (comments)     Break out in bumps      Dicloxacillin Hives and Itching    Pistachio Nut Unknown (comments)     Per pt was from allergy testing, she is not aware of a reaction      Watermelon Unknown (comments)     Little bumps         Agree with nurses note.     O:   Visit Vitals  /84 (BP 1 Location: Left upper arm, BP Patient Position: Sitting, BP Cuff Size: Adult long)   Pulse 65   Temp 98.4 °F (36.9 °C) (Oral)   Resp 16   Ht 5' 2\" (1.575 m)   Wt 194 lb 12.8 oz (88.4 kg)   LMP  (LMP Unknown)   SpO2 97%   BMI 35.63 kg/m²      PAIN: No complaints of pain today. GENERAL: Jessica Lehman  is sitting in the chair in NAD.   EYE: PERRLA. EOMs intact. Sclera anicteric without injection. RESP: Unlabored without SOB. Speaking in full sentences. Breath sounds are symmetrical bilaterally. Clear to auscultation to all fields. No wheezes. No rales or rhonchi. CV: normal rate. Regular rhythm. S1, S2 audible. No murmur noted. No rubs, clicks or gallops noted. NEURO:  awake, alert and oriented to person, place, and time and event. Clear speech. Muscle strength is +5/5 x 4 extremities. Steady gait. HEME/LYMPH: peripheral pulses palpable 2+ x 4 extremities. No peripheral edema is noted. FEET: Intact no wounds or abrasions. Denies numbness or tingling. Sensation intact  Right foot swelling (chronic) and arch drop. Diabetic foot exam performed by Girish Oakley NP     Measurement  Response Nurse Comment Physician Comment   Monofilament  R - normal sensation with micro filament  L - normal sensation with micro filament     Pulse DP R - 1+ (weak)  L - 1+ (weak)     Pulse TP R - 1+ (weak)  L - 1+ (weak)     Structural deformity R - Mild - +swelling and arch drop  L - None     Skin Integrity / Deformity R - None  L - None               Results for orders placed or performed in visit on 05/11/21   AMB POC HEMOGLOBIN A1C   Result Value Ref Range    Hemoglobin A1c (POC) 8.2 %         A/P:  Differential diagnosis and treatment options reviewed with patient who is in agreement with treatment plan as outlined below. ICD-10-CM ICD-9-CM    1.  Controlled type 2 diabetes mellitus without complication, without long-term current use of insulin (HCC)  E11.9 250.00 metFORMIN ER (GLUCOPHAGE XR) 500 mg tablet 2. Severe obesity (BMI 35.0-35.9 with comorbidity) ([facility])  E66.01 278.01     Z68.35 V85.35      BP at goal.   DM is improving but still not at goal on current therapy. Will increase metformin to 1000mg BID. Discussed BMI and healthy weight. Encouraged patient to work to implement changes including diet high in raw fruits and vegetables, lean protein and good fats. Limit refined, processed carbohydrates and sugar. Encouraged regular exercise. Advised of frequent feet checks  Patient would benefit from diabetic shoes and inserts due to foot deformity and impaired circulation. Advised yearly eye exam  Reviewed warning signs of diabetic emergency, hypertension, stroke and heart attack     Verbal and written instructions (see AVS) provided. Patient expresses understanding and agreement of diagnosis and treatment plan.

## 2021-06-25 DIAGNOSIS — E78.5 HYPERLIPIDEMIA, UNSPECIFIED HYPERLIPIDEMIA TYPE: ICD-10-CM

## 2021-06-28 RX ORDER — SIMVASTATIN 20 MG/1
TABLET, FILM COATED ORAL
Qty: 90 TABLET | Refills: 3 | Status: SHIPPED | OUTPATIENT
Start: 2021-06-28 | End: 2022-07-15 | Stop reason: SDUPTHER

## 2021-07-08 ENCOUNTER — TELEPHONE (OUTPATIENT)
Dept: FAMILY MEDICINE CLINIC | Age: 76
End: 2021-07-08

## 2021-07-08 NOTE — TELEPHONE ENCOUNTER
Last visit note and signature needed on dm shoe paperwork (doctor and np signatures preferred) faxed over to us on 6/10/2021      Call Rosa Vega if we need this faxed again at [phone] ext [phone]  Fax back to [phone]

## 2021-07-12 DIAGNOSIS — I10 ESSENTIAL HYPERTENSION: ICD-10-CM

## 2021-07-12 RX ORDER — LISINOPRIL 20 MG/1
20 TABLET ORAL DAILY
Qty: 90 TABLET | Refills: 1 | Status: SHIPPED | OUTPATIENT
Start: 2021-07-12 | End: 2022-03-15 | Stop reason: SDUPTHER

## 2021-07-12 NOTE — TELEPHONE ENCOUNTER
Requested Prescriptions     Pending Prescriptions Disp Refills    lisinopriL (PRINIVIL, ZESTRIL) 20 mg tablet 90 Tablet 1     Sig: Take 1 Tablet by mouth daily.

## 2021-07-15 NOTE — PROGRESS NOTES
Patient is under my care for comprehensive diabetes management.   She requires diabetic foot wear to prevent complications resulting from diabetes due to  foot deformity and poor circulation

## 2021-07-22 ENCOUNTER — TRANSCRIBE ORDER (OUTPATIENT)
Dept: SCHEDULING | Age: 76
End: 2021-07-22

## 2021-07-22 DIAGNOSIS — Z12.31 SCREENING MAMMOGRAM FOR HIGH-RISK PATIENT: Primary | ICD-10-CM

## 2021-07-28 LAB — HEMOCCULT STL QL IA: NEGATIVE

## 2021-08-12 ENCOUNTER — HOSPITAL ENCOUNTER (OUTPATIENT)
Dept: MAMMOGRAPHY | Age: 76
Discharge: HOME | End: 2021-08-12
Attending: NURSE PRACTITIONER
Payer: MEDICARE

## 2021-08-12 DIAGNOSIS — Z12.31 SCREENING MAMMOGRAM FOR HIGH-RISK PATIENT: ICD-10-CM

## 2021-08-12 PROCEDURE — 77063 BREAST TOMOSYNTHESIS BI: CPT

## 2021-08-19 ENCOUNTER — TELEPHONE (OUTPATIENT)
Dept: INTERNAL MEDICINE CLINIC | Age: 76
End: 2021-08-19

## 2021-08-19 NOTE — TELEPHONE ENCOUNTER
----- Message from Dianelys Rincon sent at 8/19/2021  8:44 AM EDT -----  Regarding: NOVA Hernandez/Telephone  Contact: [phone]  General Message/Vendor Calls    Caller's first and last name: Pt.       Reason for call: Pt is having right shoulder replaced 9/23. Needs to know if pt needs to be seen by pcp. Callback required yes/no and why: Yes, confirm if pt needs to be seen. Best contact number(s): [phone], [phone]      Details to clarify the request: N/a.       Dianelys Rincon

## 2021-10-22 ENCOUNTER — CLINICAL SUPPORT (OUTPATIENT)
Dept: FAMILY MEDICINE CLINIC | Age: 76
End: 2021-10-22
Payer: MEDICARE

## 2021-10-22 VITALS — TEMPERATURE: 97 F

## 2021-10-22 DIAGNOSIS — Z23 ENCOUNTER FOR IMMUNIZATION: Primary | ICD-10-CM

## 2021-10-22 PROCEDURE — 90694 VACC AIIV4 NO PRSRV 0.5ML IM: CPT | Performed by: FAMILY MEDICINE

## 2021-10-22 NOTE — PROGRESS NOTES
Estelita Collet is a 70 y.o. female who presents for routine immunizations. She denies any symptoms , reactions or allergies that would exclude them from being immunized today. Risks and adverse reactions were discussed and the VIS was given to them. All questions were addressed. She was observed for 10 min post injection. There were no reactions observed.     Ana Marcano LPN

## 2021-10-22 NOTE — PATIENT INSTRUCTIONS
Vaccine Information Statement    Influenza (Flu) Vaccine (Inactivated or Recombinant): What You Need to Know    Many vaccine information statements are available in Sinhala and other languages. See www.immunize.org/vis. Hojas de información sobre vacunas están disponibles en español y en muchos otros idiomas. Visite www.immunize.org/vis. 1. Why get vaccinated? Influenza vaccine can prevent influenza (flu). Flu is a contagious disease that spreads around the [facility] every year, usually between October and May. Anyone can get the flu, but it is more dangerous for some people. Infants and young children, people 72 years and older, pregnant people, and people with certain health conditions or a weakened immune system are at greatest risk of flu complications. Pneumonia, bronchitis, sinus infections, and ear infections are examples of flu-related complications. If you have a medical condition, such as heart disease, cancer, or diabetes, flu can make it worse. Flu can cause fever and chills, sore throat, muscle aches, fatigue, cough, headache, and runny or stuffy nose. Some people may have vomiting and diarrhea, though this is more common in children than adults. In an average year, thousands of people in the [facility] die from flu, and many more are hospitalized. Flu vaccine prevents millions of illnesses and flu-related visits to the doctor each year. 2. Influenza vaccines     CDC recommends everyone 6 months and older get vaccinated every flu season. Children 6 months through 6years of age may need 2 doses during a single flu season. Everyone else needs only 1 dose each flu season. It takes about 2 weeks for protection to develop after vaccination. There are many flu viruses, and they are always changing. Each year a new flu vaccine is made to protect against the influenza viruses believed to be likely to cause disease in the upcoming flu season.  Even when the vaccine doesnt exactly match these viruses, it may still provide some protection. Influenza vaccine does not cause flu. Influenza vaccine may be given at the same time as other vaccines. 3. Talk with your health care provider    Tell your vaccination provider if the person getting the vaccine:   Has had an allergic reaction after a previous dose of influenza vaccine, or has any severe, life-threatening allergies    Has ever had Guillain-Barré Syndrome (also called GBS)    In some cases, your health care provider may decide to postpone influenza vaccination until a future visit. Influenza vaccine can be administered at any time during pregnancy. People who are or will be pregnant during influenza season should receive inactivated influenza vaccine. People with minor illnesses, such as a cold, may be vaccinated. People who are moderately or severely ill should usually wait until they recover before getting influenza vaccine. Your health care provider can give you more information. 4. Risks of a vaccine reaction     Soreness, redness, and swelling where the shot is given, fever, muscle aches, and headache can happen after influenza vaccination.  There may be a very small increased risk of Guillain-Barré Syndrome (GBS) after inactivated influenza vaccine (the flu shot). R[employer] children who get the flu shot along with pneumococcal vaccine (PCV13) and/or DTaP vaccine at the same time might be slightly more likely to have a seizure caused by fever. Tell your health care provider if a child who is getting flu vaccine has ever had a seizure. People sometimes faint after medical procedures, including vaccination. Tell your provider if you feel dizzy or have vision changes or ringing in the ears. As with any medicine, there is a very remote chance of a vaccine causing a severe allergic reaction, other serious injury, or death. 5. What if there is a serious problem?     An allergic reaction could occur after the vaccinated person leaves the clinic. If you see signs of a severe allergic reaction (hives, swelling of the face and throat, difficulty breathing, a fast heartbeat, dizziness, or weakness), call 9-1-1 and get the person to the nearest hospital.    For other signs that concern you, call your health care provider. Adverse reactions should be reported to the Vaccine Adverse Event Reporting System (VAERS). Your health care provider will usually file this report, or you can do it yourself. Visit the VAERS website at www.vaers. [facility].gov or call 9-769.397.8981. VAERS is only for reporting reactions, and VAERS staff members do not give medical advice. 6. The National Vaccine Injury Compensation Program    The [facility] Vaccine Injury Compensation Program (VICP) is a federal program that was created to compensate people who may have been injured by certain vaccines. Claims regarding alleged injury or death due to vaccination have a time limit for filing, which may be as short as two years. Visit the VICP website at www.[facility].gov/vaccinecompensation or call 2-856.958.2952 to learn about the program and about filing a claim. 7. How can I learn more?  Ask your health care provider.  Call your local or state health department.  Visit the website of the Food and Drug Administration (FDA) for vaccine package inserts and additional information at www.fda.gov/vaccines-blood-biologics/vaccines.  Contact the Centers for Disease Control and Prevention (CDC):  - Call 1-654.714.3141 (1-800-CDC-INFO) or  - Visit CDCs influenza website at www.cdc.gov/flu. Vaccine Information Statement   Inactivated Influenza Vaccine   8/6/2021  42 DIEGO Thakkare Deep [id number]-34   Department of Health and Human Services  Centers for Disease Control and Prevention    Office Use Only

## 2021-12-15 DIAGNOSIS — E11.9 CONTROLLED TYPE 2 DIABETES MELLITUS WITHOUT COMPLICATION, WITHOUT LONG-TERM CURRENT USE OF INSULIN (HCC): ICD-10-CM

## 2021-12-15 RX ORDER — METFORMIN HYDROCHLORIDE 500 MG/1
TABLET, EXTENDED RELEASE ORAL
Qty: 360 TABLET | Refills: 1 | Status: SHIPPED | OUTPATIENT
Start: 2021-12-15 | End: 2022-06-13

## 2022-02-15 RX ORDER — METOPROLOL SUCCINATE 50 MG/1
TABLET, EXTENDED RELEASE ORAL
Qty: 90 TABLET | Refills: 4 | Status: SHIPPED | OUTPATIENT
Start: 2022-02-15

## 2022-02-15 NOTE — TELEPHONE ENCOUNTER
Refill Refill (patient calling)   Pt requesting 90 day supply     Requested Prescriptions     Pending Prescriptions Disp Refills    metoprolol succinate (Toprol XL) 50 mg XL tablet 90 Tablet 4     Sig: TAKE 1 TABLET DAILY     Pt also would like to know when she is able to come back in for an appt       Thank You

## 2022-03-15 DIAGNOSIS — I10 ESSENTIAL HYPERTENSION: ICD-10-CM

## 2022-03-15 RX ORDER — LISINOPRIL 20 MG/1
20 TABLET ORAL DAILY
Qty: 90 TABLET | Refills: 1 | Status: SHIPPED | OUTPATIENT
Start: 2022-03-15

## 2022-03-15 NOTE — TELEPHONE ENCOUNTER
Refill Request (rachel sent fax)      Requested Prescriptions     Pending Prescriptions Disp Refills    lisinopriL (PRINIVIL, ZESTRIL) 20 mg tablet 90 Tablet 1     Sig: Take 1 Tablet by mouth daily.      Thank you

## 2022-03-19 PROBLEM — E78.5 HYPERLIPIDEMIA: Status: ACTIVE | Noted: 2017-01-18

## 2022-03-19 PROBLEM — F98.8 ATTENTION DEFICIT DISORDER (ADD) IN ADULT: Status: ACTIVE | Noted: 2019-01-25

## 2022-03-20 PROBLEM — E66.01 OBESITY, MORBID (HCC): Status: ACTIVE | Noted: 2018-02-28

## 2022-03-20 PROBLEM — M19.011 PRIMARY OSTEOARTHRITIS OF RIGHT SHOULDER: Status: ACTIVE | Noted: 2018-06-13

## 2022-05-03 DIAGNOSIS — E11.9 CONTROLLED TYPE 2 DIABETES MELLITUS WITHOUT COMPLICATION, WITHOUT LONG-TERM CURRENT USE OF INSULIN (HCC): ICD-10-CM

## 2022-05-04 RX ORDER — BLOOD SUGAR DIAGNOSTIC
STRIP MISCELLANEOUS
Qty: 100 STRIP | Refills: 3 | Status: SHIPPED | OUTPATIENT
Start: 2022-05-04 | End: 2022-06-29 | Stop reason: SDUPTHER

## 2022-05-16 RX ORDER — NYSTATIN 100000 U/G
CREAM TOPICAL
Qty: 60 G | Refills: 0 | Status: SHIPPED | OUTPATIENT
Start: 2022-05-16

## 2022-06-13 DIAGNOSIS — E11.9 CONTROLLED TYPE 2 DIABETES MELLITUS WITHOUT COMPLICATION, WITHOUT LONG-TERM CURRENT USE OF INSULIN (HCC): ICD-10-CM

## 2022-06-13 RX ORDER — METFORMIN HYDROCHLORIDE 500 MG/1
TABLET, EXTENDED RELEASE ORAL
Qty: 360 TABLET | Refills: 1 | Status: SHIPPED | OUTPATIENT
Start: 2022-06-13

## 2022-06-29 ENCOUNTER — OFFICE VISIT (OUTPATIENT)
Dept: FAMILY MEDICINE CLINIC | Age: 77
End: 2022-06-29
Payer: MEDICARE

## 2022-06-29 VITALS
HEART RATE: 63 BPM | HEIGHT: 62 IN | OXYGEN SATURATION: 97 % | DIASTOLIC BLOOD PRESSURE: 63 MMHG | SYSTOLIC BLOOD PRESSURE: 123 MMHG | WEIGHT: 194.4 LBS | BODY MASS INDEX: 35.77 KG/M2 | TEMPERATURE: 98.3 F | RESPIRATION RATE: 18 BRPM

## 2022-06-29 DIAGNOSIS — E11.65 TYPE 2 DIABETES MELLITUS WITH HYPERGLYCEMIA, WITHOUT LONG-TERM CURRENT USE OF INSULIN (HCC): ICD-10-CM

## 2022-06-29 DIAGNOSIS — Z00.00 MEDICARE ANNUAL WELLNESS VISIT, SUBSEQUENT: Primary | ICD-10-CM

## 2022-06-29 DIAGNOSIS — E66.01 SEVERE OBESITY (BMI 35.0-39.9) WITH COMORBIDITY (HCC): ICD-10-CM

## 2022-06-29 DIAGNOSIS — E11.9 CONTROLLED TYPE 2 DIABETES MELLITUS WITHOUT COMPLICATION, WITHOUT LONG-TERM CURRENT USE OF INSULIN (HCC): ICD-10-CM

## 2022-06-29 DIAGNOSIS — Z12.11 SPECIAL SCREENING FOR MALIGNANT NEOPLASMS, COLON: ICD-10-CM

## 2022-06-29 DIAGNOSIS — E78.5 HYPERLIPIDEMIA, UNSPECIFIED HYPERLIPIDEMIA TYPE: ICD-10-CM

## 2022-06-29 DIAGNOSIS — I10 ESSENTIAL HYPERTENSION: ICD-10-CM

## 2022-06-29 LAB
ALBUMIN UR QL STRIP: 10 MG/L
CREATININE, URINE POC: 100 MG/DL
MICROALBUMIN/CREAT RATIO POC: <30 MG/G

## 2022-06-29 PROCEDURE — 1101F PT FALLS ASSESS-DOCD LE1/YR: CPT | Performed by: NURSE PRACTITIONER

## 2022-06-29 PROCEDURE — G8427 DOCREV CUR MEDS BY ELIG CLIN: HCPCS | Performed by: NURSE PRACTITIONER

## 2022-06-29 PROCEDURE — G0463 HOSPITAL OUTPT CLINIC VISIT: HCPCS | Performed by: NURSE PRACTITIONER

## 2022-06-29 PROCEDURE — G0439 PPPS, SUBSEQ VISIT: HCPCS | Performed by: NURSE PRACTITIONER

## 2022-06-29 PROCEDURE — G8399 PT W/DXA RESULTS DOCUMENT: HCPCS | Performed by: NURSE PRACTITIONER

## 2022-06-29 PROCEDURE — 1123F ACP DISCUSS/DSCN MKR DOCD: CPT | Performed by: NURSE PRACTITIONER

## 2022-06-29 PROCEDURE — 99214 OFFICE O/P EST MOD 30 MIN: CPT | Performed by: NURSE PRACTITIONER

## 2022-06-29 PROCEDURE — 3052F HG A1C>EQUAL 8.0%<EQUAL 9.0%: CPT | Performed by: NURSE PRACTITIONER

## 2022-06-29 PROCEDURE — G8417 CALC BMI ABV UP PARAM F/U: HCPCS | Performed by: NURSE PRACTITIONER

## 2022-06-29 PROCEDURE — G8536 NO DOC ELDER MAL SCRN: HCPCS | Performed by: NURSE PRACTITIONER

## 2022-06-29 PROCEDURE — 2022F DILAT RTA XM EVC RTNOPTHY: CPT | Performed by: NURSE PRACTITIONER

## 2022-06-29 PROCEDURE — 3017F COLORECTAL CA SCREEN DOC REV: CPT | Performed by: NURSE PRACTITIONER

## 2022-06-29 PROCEDURE — G9899 SCRN MAM PERF RSLTS DOC: HCPCS | Performed by: NURSE PRACTITIONER

## 2022-06-29 PROCEDURE — 82044 UR ALBUMIN SEMIQUANTITATIVE: CPT | Performed by: NURSE PRACTITIONER

## 2022-06-29 PROCEDURE — 1090F PRES/ABSN URINE INCON ASSESS: CPT | Performed by: NURSE PRACTITIONER

## 2022-06-29 PROCEDURE — G8752 SYS BP LESS 140: HCPCS | Performed by: NURSE PRACTITIONER

## 2022-06-29 PROCEDURE — G9717 DOC PT DX DEP/BP F/U NT REQ: HCPCS | Performed by: NURSE PRACTITIONER

## 2022-06-29 PROCEDURE — G8754 DIAS BP LESS 90: HCPCS | Performed by: NURSE PRACTITIONER

## 2022-06-29 RX ORDER — BLOOD SUGAR DIAGNOSTIC
STRIP MISCELLANEOUS
Qty: 100 STRIP | Refills: 3 | Status: SHIPPED | OUTPATIENT
Start: 2022-06-29

## 2022-06-29 NOTE — PATIENT INSTRUCTIONS
Medicare Wellness Visit, Female     The best way to live healthy is to have a lifestyle where you eat a well-balanced diet, exercise regularly, limit alcohol use, and quit all forms of tobacco/nicotine, if applicable. Regular preventive services are another way to keep healthy. Preventive services (vaccines, screening tests, monitoring & exams) can help personalize your care plan, which helps you manage your own care. Screening tests can find health problems at the earliest stages, when they are easiest to treat. [facility] follows the current, evidence-based guidelines published by the [facility] ([facility]) when recommending preventive services for our patients. Because we follow these guidelines, sometimes recommendations change over time as research supports it. (For example, mammograms used to be recommended annually. Even though Medicare will still pay for an annual mammogram, the newer guidelines recommend a mammogram every two years for women of average risk). Of course, you and your doctor may decide to screen more often for some diseases, based on your risk and your co-morbidities (chronic disease you are already diagnosed with). Preventive services for you include:  - Medicare offers their members a free annual wellness visit, which is time for you and your primary care provider to discuss and plan for your preventive service needs. Take advantage of this benefit every year!  -All adults over the age of 72 should receive the recommended pneumonia vaccines. Current USPSTF guidelines recommend a series of two vaccines for the best pneumonia protection.   -All adults should have a flu vaccine yearly and a tetanus vaccine every 10 years.   -All adults age 48 and older should receive the shingles vaccines (series of two vaccines).       -All adults age 38-68 who are overweight should have a diabetes screening test once every three years.   -All adults born between [birth year] and 1963 should be screened once for Hepatitis C.  -Other screening tests and preventive services for persons with diabetes include: an eye exam to screen for diabetic retinopathy, a kidney function test, a foot exam, and stricter control over your cholesterol.   -Cardiovascular screening for adults with routine risk involves an electrocardiogram (ECG) at intervals determined by your doctor.   -Colorectal cancer screenings should be done for adults age 54-65 with no increased risk factors for colorectal cancer. There are a number of acceptable methods of screening for this type of cancer. Each test has its own benefits and drawbacks. Discuss with your doctor what is most appropriate for you during your annual wellness visit. The different tests include: colonoscopy (considered the best screening method), a fecal occult blood test, a fecal DNA test, and sigmoidoscopy.    -A bone mass density test is recommended when a woman turns 65 to screen for osteoporosis. This test is only recommended one time, as a screening. Some providers will use this same test as a disease monitoring tool if you already have osteoporosis. -Breast cancer screenings are recommended every other year for women of normal risk, age 54-69.  -Cervical cancer screenings for women over age 72 are only recommended with certain risk factors. Here is a list of your current Health Maintenance items (your personalized list of preventive services) with a due date:  Health Maintenance Due   Topic Date Due    Eye Exam  Never done    Depression Monitoring  Never done    Shingles Vaccine (1 of 2) Never done    COVID-19 Vaccine (3 - Booster for Moderna series) 09/21/2021    Albumin Urine Test  01/07/2022    Annual Well Visit  01/08/2022    Diabetic Foot Care  06/23/2022    Colorectal Screening  07/21/2022            Kegel Exercises: Care Instructions  Overview     Kegel exercises strengthen muscles around the bladder. These muscles control the flow of urine. Kegel exercises are sometime called \"pelvic floor\" exercises. They can help prevent urine leakage and keep the pelvic organs in place. Kegel exercises can strengthen pelvic muscles that have been weakened by age, pregnancy, childbirth, and surgery. They may help prevent or treat urine leakage. You do Kegel exercises by tightening the muscles you use when you urinate. You will likely need to do these exercises for several weeks to get better. Follow-up care is a key part of your treatment and safety. Be sure to make and go to all appointments, and call your doctor if you are having problems. It's also a good idea to know your test results and keep a list of the medicines you take. How can you care for yourself at home? · Do Kegel exercises. ? Find the muscles you need to strengthen. To do this, tighten the muscles that stop your urine while you are going to the bathroom. These are the same muscles you squeeze during Kegel exercises. ? Squeeze the muscles as hard as you can. Your belly and thighs should not move. ? Hold the squeeze for 3 seconds. Then relax for 3 seconds. ? Start with 3 seconds, and then add 1 second each week until you are able to squeeze for 10 seconds. ? Repeat the exercise 10 to 15 times for each session. Do three or more sessions each day. · You can check to see if you are using the right muscles. ? Tighten the muscles that help you stop passing gas or keep you from urinating. Make sure you aren't using your stomach, leg, or buttock muscles. ? Place a finger in your vagina and squeeze around it. You are doing them right when you feel pressure around your finger. Your doctor may also suggest that you put special weights in your vagina while you do the exercises. · Check with your doctor if you don't notice a difference after trying these exercises for several weeks.  Your doctor may suggest getting help from a physical therapist or recommend other treatment. Where can you learn more? Go to http://www.gray.[employer]/  Enter I946 in the search box to learn more about \"Kegel Exercises: Care Instructions. \"  Current as of: October 18, 2021               Content Version: 13.2  © 2006-2022 [employer]. Care instructions adapted under license by [employer] (which disclaims liability or warranty for this information). If you have questions about a medical condition or this instruction, always ask your healthcare professional. [place] [address] any warranty or liability for your use of this information. Learning About Meal Planning for Diabetes  Why plan your meals? Meal planning can be a key part of managing diabetes. Planning meals and snacks with the right balance of carbohydrate, protein, and fat can help you keep your blood sugar at the target level you set with your doctor. You don't have to eat special foods. You can eat what your family eats, including sweets once in a while. But you do have to pay attention to how often you eat and how much you eat of certain foods. You may want to work with a dietitian or a diabetes educator. They can give you tips and meal ideas and can answer your questions about meal planning. This health professional can also help you reach a healthy weight if that is one of your goals. What plan is right for you? Your dietitian or diabetes educator may suggest that you start with the plate format or carbohydrate counting. The plate format  The plate format is a simple way to help you manage how you eat. You plan meals by learning how much space each food should take on a plate. Using the plate format helps you manage the amount of carbohydrate you eat. It can make it easier to keep your blood sugar level within your target range. It also helps you see if you're eating healthy portion sizes.   To use the plate format, you put non-starchy vegetables on half your plate. Add lean protein foods, such as fish, lean meats and poultry, or soy products, on one-quarter of the plate. Put a grain or starchy vegetable (such as brown rice or a potato) on the final quarter of the plate. You can add a small piece of fruit and some low-fat or fat-free milk or yogurt, depending on your carbohydrate goal for each meal.  Here are some tips for using the plate format:  · Make sure that you are not using an oversized plate. A 9-inch plate is best. Many restaurants use larger plates. · Get used to using the plate format at home. Then you can use it when you eat out. · Write down your questions about using the plate format. Talk to your doctor, a dietitian, or a diabetes educator about your concerns. Carbohydrate counting  With carbohydrate counting, you plan meals based on the amount of carbohydrate in each food. Carbohydrate raises blood sugar higher and more quickly than any other nutrient. It is found in desserts, breads and cereals, and fruit. It's also found in starchy vegetables such as potatoes and corn, grains such as rice and pasta, and milk and yogurt. You can help keep your blood sugar levels within your target range by planning how much carbohydrate to have at meals and snacks. The amount you need depends on several things. These include your weight, how active you are, which diabetes medicines you take, and what your goals are for your blood sugar levels. A registered dietitian or diabetes educator can help you plan how much carbohydrate to include in each meal and snack. An example of a carbohydrate counting plan is:  · 45 to 60 grams at each meal. That's about the same as 3 to 4 carbohydrate servings. · 15 to 20 grams at each snack. That's about the same as 1 carbohydrate serving. The Nutrition Facts label on packaged foods tells you how much carbohydrate is in a serving of the food. First, look at the serving size on the food label.  Is that the amount you eat in a serving? All of the nutrition information on a food label is based on that serving size. So if you eat more or less than that, you'll need to adjust the other numbers. Total carbohydrate is the next thing you need to look for on the label. If you count carbohydrate servings, one serving of carbohydrate is 15 grams. For foods that don't come with labels, such as fresh fruits and vegetables, you'll need a guide that lists carbohydrate in these foods. Ask your doctor, dietitian, or diabetes educator about books or other nutrition guides you can use. If you take insulin, you need to know how many grams of carbohydrate are in a meal. This lets you know how much rapid-acting insulin to take before you eat. If you use an insulin pump, you get a constant rate of insulin during the day. So the pump must be programmed at meals to give you extra insulin to cover the rise in blood sugar after meals. When you know how much carbohydrate you will eat, you can take the right amount of insulin. Or, if you always use the same amount of insulin, you need to make sure that you eat the same amount of carbohydrate at meals. If you need more help to understand carbohydrate counting and food labels, ask your doctor, dietitian, or diabetes educator. How can you plan healthy meals? Here are some tips to get started:  · Plan your meals a week at a time. Don't forget to include snacks too. · Use cookbooks or online recipes to plan several main meals. Plan some quick meals for busy nights. You also can double some recipes that freeze well. Then you can save half for other busy nights when you don't have time to cook. · Make sure you have the ingredients you need for your recipes. If you're running low on basic items, put these items on your shopping list too. · List foods that you use to make breakfasts, lunches, and snacks. List plenty of fruits and vegetables. · Post this list on the refrigerator.  Add to it as you think of more things you need. · Take the list to the store to do your weekly shopping. Follow-up care is a key part of your treatment and safety. Be sure to make and go to all appointments, and call your doctor if you are having problems. It's also a good idea to know your test results and keep a list of the medicines you take. Where can you learn more? Go to http://www.gray.com/  Enter V814 in the search box to learn more about \"Learning About Meal Planning for Diabetes. \"  Current as of: September 8, 2021               Content Version: 13.2  © 5799-5485 [employer]. Care instructions adapted under license by [employer] (which disclaims liability or warranty for this information). If you have questions about a medical condition or this instruction, always ask your healthcare professional. [address] any warranty or liability for your use of this information.

## 2022-06-29 NOTE — PROGRESS NOTES
Chief Complaint   Patient presents with    Follow-up     diabetes       This is the Subsequent Medicare Annual Wellness Exam, performed 12 months or more after the Initial AWV or the last Subsequent AWV    I have reviewed the patient's medical history in detail and updated the computerized patient record. Assessment/Plan   Education and counseling provided:  Are appropriate based on today's review and evaluation  Pneumococcal Vaccine  Influenza Vaccine  Screening Mammography  Colorectal cancer screening tests  Bone mass measurement (DEXA)  Screening for glaucoma  Medical nutrition therapy for individuals with diabetes or renal disease    1. Medicare annual wellness visit, subsequent  2. Controlled type 2 diabetes mellitus without complication, without long-term current use of insulin ([address]  3. Type 2 diabetes mellitus with hyperglycemia, without long-term current use of insulin ([facility])  -     AMB POC URINE, MICROALBUMIN, SEMIQUANT (3 RESULTS)  4. Essential hypertension  5. Hyperlipidemia, unspecified hyperlipidemia type       Depression Risk Factor Screening     3 most recent PHQ Screens 6/29/2022   Little interest or pleasure in doing things Not at all   Feeling down, depressed, irritable, or hopeless Not at all   Total Score PHQ 2 0       Alcohol & Drug Abuse Risk Screen    Do you average more than 1 drink per night or more than 7 drinks a week:  No    On any one occasion in the past three months have you have had more than 3 drinks containing alcohol:  No          Functional Ability and Level of Safety    Hearing: The patient needs further evaluation. had hearing exam, has hearing aid appointment sent up     Activities of Daily Living: The home contains: no safety equipment. Patient does total self care      Ambulation: with no difficulty     Fall Risk:  Fall Risk Assessment, last 12 mths 6/23/2021   Able to walk? Yes   Fall in past 12 months? 0   Do you feel unsteady?  1   Are you worried about falling 0 Is TUG test greater than 12 seconds? -   Is the gait abnormal? -   Number of falls in past 12 months -   Fall with injury? -      Abuse Screen:  Patient is not abused       Cognitive Screening    Has your family/caregiver stated any concerns about your memory: no         Health Maintenance Due     Health Maintenance Due   Topic Date Due    Eye Exam Retinal or Dilated  Never done    Depression Monitoring  Never done    Shingrix Vaccine Age 50> (1 of 2) Never done    COVID-19 Vaccine (3 - Booster for Moderna series) 09/21/2021    MICROALBUMIN Q1  01/07/2022    Foot Exam Q1  06/23/2022    Colorectal Cancer Screening Combo  07/21/2022       Patient Care Team   Patient Care Team:  Mick Arciniega NP as PCP - General (Pediatric Medicine)  Mick Arciniega NP as PCP - [facility] Empaneled Provider    History     Patient Active Problem List   Diagnosis Code    Hyperlipidemia E78.5    Hypertension I10    Depression F32. A    Arthritis M19.90    Degenerative disc disease, lumbar M51.36    Hypercholesterolemia E78.00    Obesity, morbid ([address] E66.01    Primary osteoarthritis of right shoulder M19.011    Attention deficit disorder (ADD) in adult F98.8     Past Medical History:   Diagnosis Date    ADHD     Adverse effect of anesthesia     hard to put to sleep says patient    Anemia     Arthritis     bilateral shoulders, back.   Sees rheumatology in [place] (Dr. Van Green)    Chronic pain     shoulders, back    Degenerative disc disease, lumbar     [place]    Depression     Diabetes [facility])     Pre diabetic: oral medication    Hypercholesterolemia     Hypertension     Memory difficulties     MRSA (methicillin resistant staph aureus) culture positive 06/04/2018    pos for staph, NOT pos for MRSA, pt was treated, neg cx on 08/2018, pt denies hx of MRSA    Sleep apnea     uses CPAP      Past Surgical History:   Procedure Laterality Date    HX APPENDECTOMY  1999    HX CARPAL TUNNEL RELEASE Bilateral     HX GYN  2008    hysterectomy    HX HIP REPLACEMENT Bilateral     HX HYSTEROSCOPY      HX KNEE REPLACEMENT Bilateral 2002    HX ORTHOPAEDIC  2014    right hip revision    HX ORTHOPAEDIC  2003, 2004    bilateral CTR    HX SHOULDER REPLACEMENT Left 05/21/2021     Current Outpatient Medications   Medication Sig Dispense Refill    metFORMIN ER (GLUCOPHAGE XR) 500 mg tablet TAKE 2 TABLETS BY MOUTH TWICE DAILY 360 Tablet 1    nystatin (MYCOSTATIN) topical cream APPLY TOPICALLY TO THE AFFECTED AREA TWICE DAILY 60 g 0    glucose blood VI test strips (OneTouch Ultra Test) strip USE AS DIRECTED TO TEST BLOOD SUGAR DAILY 100 Strip 3    lisinopriL (PRINIVIL, ZESTRIL) 20 mg tablet Take 1 Tablet by mouth daily. 90 Tablet 1    metoprolol succinate (Toprol XL) 50 mg XL tablet TAKE 1 TABLET DAILY 90 Tablet 4    simvastatin (ZOCOR) 20 mg tablet TAKE 1 TABLET NIGHTLY 90 Tablet 3    meloxicam (MOBIC) 15 mg tablet 15 mg.      diclofenac potassium (CATAFLAM) 50 mg tablet Take 50 mg by mouth two (2) times a day.  pantoprazole (Protonix) 20 mg tablet Take 20 mg by mouth daily.  Blood-Glucose Meter monitoring kit Check blood sugar daily. Dx code E11.9 1 Kit 0    lancets misc Check blood sugar daily. Dx code E11.9 100 Each 11    DULoxetine (CYMBALTA) 30 mg capsule Take 1 cap by mouth every evening. (Patient taking differently: Take 30 mg by mouth every evening. Take 1 cap by mouth every evening.) 90 Cap 1    DULoxetine (CYMBALTA) 60 mg capsule Take 1 cap by mouth every evening. (Patient taking differently: Take 60 mg by mouth daily.  Indications: CHRONIC MUSCULOSKELETAL PAIN) 90 Cap 1    gabapentin (NEURONTIN) 300 mg capsule TAKE 1 CAPSULE BY MOUTH 5 TIMES DAILY (Patient taking differently: Indications: pt reports taking 3 tab three times daily) 420 Cap 0     Allergies   Allergen Reactions    Other Food Rash     Citrullus Vulgaris (watermelon)    Sulfa (Sulfonamide Antibiotics) Hives    Zoloft [Sertraline] Hives    Beef Containing Products Other (comments)     Break out in bumps      Dicloxacillin Hives and Itching    Pistachio Nut Unknown (comments)     Per pt was from allergy testing, she is not aware of a reaction      Watermelon Unknown (comments)     Little bumps       No family history on file. Social History     Tobacco Use    Smoking status: Never Smoker    Smokeless tobacco: Never Used   Substance Use Topics    Alcohol use: No         Rose Hernandez NP         CCP: Diabetes follow up    S: Lo Rahman is a 78 y.o. yo female who presents for DM follow up. Last DM check hemoglobin A1c on 5/2/22 was 8.1  Blood sugar fasting usually 125 and after eating 180s  Taking medication as directed. No new numbness or tingling. No polyuria or polydipsia      Followed by rheumatology for back and shoulder arthritis. Has back pain as well, chronic. Has had injections in the past.  She is followed by [facility] ortho. She takes flexeril as needed for spasms     She has had increased urinary stress incontinence   She knows she needs to try kegel exercise. Health Maintenance Reviewed    Denies cardiac complaints including chest pain or discomfort, elevated heart rate, or palpitations. Denies any headache, vision changes, numbness and tingling or weakness in her extremities. Denies respiratory complaints including SOB, difficulty or pain with breathing, wheezes, and cough. eels well and ROS is otherwise negative. Past Medical History:   Diagnosis Date    ADHD     Adverse effect of anesthesia     hard to put to sleep says patient    Anemia     Arthritis     bilateral shoulders, back.   Sees rheumatology in [place] (Dr. Althea Ferreira)    Chronic pain     shoulders, back    Degenerative disc disease, lumbar     [place] ([place] physician)    Depression     Diabetes [facility])     Pre diabetic: oral medication    Hypercholesterolemia     Hypertension     Memory difficulties     MRSA (methicillin resistant staph aureus) culture positive 06/04/2018    pos for staph, NOT pos for MRSA, pt was treated, neg cx on 08/2018, pt denies hx of MRSA    Sleep apnea     uses CPAP      Past Surgical History:   Procedure Laterality Date    HX APPENDECTOMY  1999    HX CARPAL TUNNEL RELEASE Bilateral     HX GYN  2008    hysterectomy    HX HIP REPLACEMENT Bilateral     HX HYSTEROSCOPY      HX KNEE REPLACEMENT Bilateral 2002    HX ORTHOPAEDIC  2014    right hip revision    HX ORTHOPAEDIC  2003, 2004    bilateral CTR    HX SHOULDER REPLACEMENT Left 05/21/2021     Social History     Socioeconomic History    Marital status:      Spouse name: Not on file    Number of children: Not on file    Years of education: Not on file    Highest education level: Not on file   Occupational History    Not on file   Tobacco Use    Smoking status: Never Smoker    Smokeless tobacco: Never Used   Substance and Sexual Activity    Alcohol use: No    Drug use: No    Sexual activity: Not on file   Other Topics Concern    Not on file   Social History Narrative    Not on file     Social Determinants of Health     Financial Resource Strain:     Difficulty of Paying Living Expenses: Not on file   Food Insecurity:     Worried About Running Out of Food in the Last Year: Not on file    Neel of Food in the Last Year: Not on file   Transportation Needs:     Lack of Transportation (Medical): Not on file    Lack of Transportation (Non-Medical):  Not on file   Physical Activity:     Days of Exercise per Week: Not on file    Minutes of Exercise per Session: Not on file   Stress:     Feeling of Stress : Not on file   Social Connections:     Frequency of Communication with Friends and Family: Not on file    Frequency of Social Gatherings with Friends and Family: Not on file    Attends [employer] Services: Not on file    Active Member of Clubs or Organizations: Not on file    Attends Club or Organization Meetings: Not on file    Marital Status: Not on file   Intimate Partner Violence:     Fear of Current or Ex-Partner: Not on file    Emotionally Abused: Not on file    Physically Abused: Not on file    Sexually Abused: Not on file   Housing Stability:     Unable to Pay for Housing in the Last Year: Not on file    Number of Places Lived in the Last Year: Not on file    Unstable Housing in the Last Year: Not on file     Current Outpatient Medications   Medication Sig Dispense Refill    metFORMIN ER (GLUCOPHAGE XR) 500 mg tablet TAKE 2 TABLETS BY MOUTH TWICE DAILY 360 Tablet 1    nystatin (MYCOSTATIN) topical cream APPLY TOPICALLY TO THE AFFECTED AREA TWICE DAILY 60 g 0    glucose blood VI test strips (OneTouch Ultra Test) strip USE AS DIRECTED TO TEST BLOOD SUGAR DAILY 100 Strip 3    lisinopriL (PRINIVIL, ZESTRIL) 20 mg tablet Take 1 Tablet by mouth daily. 90 Tablet 1    metoprolol succinate (Toprol XL) 50 mg XL tablet TAKE 1 TABLET DAILY 90 Tablet 4    simvastatin (ZOCOR) 20 mg tablet TAKE 1 TABLET NIGHTLY 90 Tablet 3    meloxicam (MOBIC) 15 mg tablet 15 mg.      diclofenac potassium (CATAFLAM) 50 mg tablet Take 50 mg by mouth two (2) times a day.  pantoprazole (Protonix) 20 mg tablet Take 20 mg by mouth daily.  Blood-Glucose Meter monitoring kit Check blood sugar daily. Dx code E11.9 1 Kit 0    lancets misc Check blood sugar daily. Dx code E11.9 100 Each 11    DULoxetine (CYMBALTA) 30 mg capsule Take 1 cap by mouth every evening. (Patient taking differently: Take 30 mg by mouth every evening. Take 1 cap by mouth every evening.) 90 Cap 1    DULoxetine (CYMBALTA) 60 mg capsule Take 1 cap by mouth every evening. (Patient taking differently: Take 60 mg by mouth daily.  Indications: CHRONIC MUSCULOSKELETAL PAIN) 90 Cap 1    gabapentin (NEURONTIN) 300 mg capsule TAKE 1 CAPSULE BY MOUTH 5 TIMES DAILY (Patient taking differently: Indications: pt reports taking 3 tab three times daily) 420 Cap 0       Pt is taking all medications as prescribed without any side effects or difficulty. Allergies   Allergen Reactions    Other Food Rash     Citrullus Vulgaris (watermelon)    Sulfa (Sulfonamide Antibiotics) Hives    Zoloft [Sertraline] Hives    Beef Containing Products Other (comments)     Break out in bumps      Dicloxacillin Hives and Itching    Pistachio Nut Unknown (comments)     Per pt was from allergy testing, she is not aware of a reaction      Watermelon Unknown (comments)     Little bumps         Agree with nurses note. O:   Visit Vitals  /63 (BP 1 Location: Right upper arm, BP Patient Position: Sitting, BP Cuff Size: Adult)   Pulse 63   Temp 98.3 °F (36.8 °C) (Temporal)   Resp 18   Ht 5' 2\" (1.575 m)   Wt 194 lb 6.4 oz (88.2 kg)   LMP  (LMP Unknown)   SpO2 97%   BMI 35.56 kg/m²      PAIN: No complaints of pain today. GENERAL: Lurdes Eli  is sitting in the chair in NAD.   EYE: PERRLA. EOMs intact. Sclera anicteric without injection. RESP: Unlabored without SOB. Speaking in full sentences. Breath sounds are symmetrical bilaterally. Clear to auscultation to all fields. No wheezes. No rales or rhonchi. CV: normal rate. Regular rhythm. S1, S2 audible. No murmur noted. No rubs, clicks or gallops noted. NEURO:  awake, alert and oriented to person, place, and time and event. Clear speech. Muscle strength is +5/5 x 4 extremities. Steady gait. HEME/LYMPH: peripheral pulses palpable 2+ x 4 extremities. No peripheral edema is noted. FEET: Intact no wounds or abrasions. Denies numbness or tingling.  Sensation intact    Results for orders placed or performed in visit on 05/02/22   AMB EXT LDL-C   Result Value Ref Range    LDL-C, External 83    AMB EXT CREATININE   Result Value Ref Range    Creatinine, External 0.80    AMB EXT HGBA1C   Result Value Ref Range    Hemoglobin A1c, External 8.1 %         A/P:  Differential diagnosis and treatment options reviewed with patient who is in agreement with treatment plan as outlined below. ICD-10-CM ICD-9-CM    1. Medicare annual wellness visit, subsequent  Z00.00 V70.0    2. Controlled type 2 diabetes mellitus without complication, without long-term current use of insulin (HCC)  E11.9 250.00 glucose blood VI test strips (OneTouch Ultra Test) strip   3. Type 2 diabetes mellitus with hyperglycemia, without long-term current use of insulin (HCC)  E11.65 250.00 AMB POC URINE, MICROALBUMIN, SEMIQUANT (3 RESULTS)     790.29 SITagliptin (JANUVIA) 25 mg tablet   4. Essential hypertension  I10 401.9    5. Hyperlipidemia, unspecified hyperlipidemia type  E78.5 272.4    6. Special screening for malignant neoplasms, colon  Z12.11 V76.51 OCCULT BLOOD IMMUNOASSAY,DIAGNOSTIC      OCCULT BLOOD IMMUNOASSAY,DIAGNOSTIC   7. Severe obesity (BMI 35.0-39. 9) with comorbidity ([facility] [address] [address]  E66.01 278.01      BP at goal.  DM is not at goal.  She is taking metformin daily without much improvement  Will add Januvia, follow up in three months. Discussed BMI and healthy weight. Encouraged patient to work to implement changes including diet high in raw fruits and vegetables, lean protein and good fats. Limit refined, processed carbohydrates and sugar. Encouraged regular exercise. Advised of frequent feet checks  Advised yearly eye exam  Reviewed warning signs of diabetic emergency, hypertension, stroke and heart attack     Verbal and written instructions (see AVS) provided. Patient expresses understanding and agreement of diagnosis and treatment plan.

## 2022-07-15 DIAGNOSIS — E78.5 HYPERLIPIDEMIA, UNSPECIFIED HYPERLIPIDEMIA TYPE: ICD-10-CM

## 2022-07-15 RX ORDER — SIMVASTATIN 20 MG/1
20 TABLET, FILM COATED ORAL
Qty: 90 TABLET | Refills: 3 | Status: SHIPPED | OUTPATIENT
Start: 2022-07-15

## 2022-07-15 NOTE — TELEPHONE ENCOUNTER
Refill Request (patient calling)   -pt is requesting a 90 day supply   -please call pt when RX has been sent to pharmacy       Requested Prescriptions     Pending Prescriptions Disp Refills    simvastatin (ZOCOR) 20 mg tablet 90 Tablet 3     Si Tablet nightly.        Thank you

## 2022-07-19 ENCOUNTER — TELEPHONE (OUTPATIENT)
Dept: FAMILY MEDICINE CLINIC | Age: 77
End: 2022-07-19

## 2022-07-19 NOTE — TELEPHONE ENCOUNTER
Pt has tested positive for Covid last night (07/18). She has a fever of 100.5, dry scratching throat and cough. Anything you could call in?     Pharmacy confirmed as [id number]    Pt has had 2 shots and 1 booster already

## 2022-07-21 ENCOUNTER — VIRTUAL VISIT (OUTPATIENT)
Dept: FAMILY MEDICINE CLINIC | Age: 77
End: 2022-07-21
Payer: MEDICARE

## 2022-07-21 DIAGNOSIS — U07.1 COVID-19: Primary | ICD-10-CM

## 2022-07-21 DIAGNOSIS — R05.9 COUGH: ICD-10-CM

## 2022-07-21 PROCEDURE — 99442 PR PHYS/QHP TELEPHONE EVALUATION 11-20 MIN: CPT | Performed by: NURSE PRACTITIONER

## 2022-07-21 RX ORDER — BENZONATATE 200 MG/1
200 CAPSULE ORAL
Qty: 21 CAPSULE | Refills: 0 | Status: SHIPPED | OUTPATIENT
Start: 2022-07-21 | End: 2022-07-28

## 2022-07-21 NOTE — PROGRESS NOTES
Chief Complaint   Patient presents with    Sore Throat    Positive For Covid-19     1. Have you been to the ER, urgent care clinic since your last visit? Hospitalized since your last visit? No    2. Have you seen or consulted any other health care providers outside of the [address] [place] [address] since your last visit? Include any pap smears or colon screening.  Yes Ortho    Health Maintenance Due   Topic Date Due    Eye Exam Retinal or Dilated  Never done    Shingrix Vaccine Age 50> (1 of 2) Never done    COVID-19 Vaccine (3 - Booster for Moderna series) 09/21/2021    Foot Exam Q1  06/23/2022    Colorectal Cancer Screening Combo  07/21/2022

## 2022-07-21 NOTE — PROGRESS NOTES
Nayan Sotelo is a 78 y.o. female, evaluated via audio-only technology on 7/21/2022 for Sore Throat and Positive For Covid-19      Assessment & Plan:   Diagnoses and all orders for this visit:    1. COVID-19    2. Cough  -     benzonatate (TESSALON) 200 mg capsule; Take 1 Capsule by mouth three (3) times daily as needed for Cough for up to 7 days. Mild and stable symptoms are managed at home      Rest, vitamin C, Zinc, and plenty of fluids are my primary suggestions. You may also try alternating Tylenol and Motrin (if not allergic) every 3 hours. This may help your symptoms too. Use of any OTC meds for your symptoms is appropriate, such as mucinex or robitussin, nasal saline flushes, and/or allergy medicine. May use Tessalon as needed for cough as well. Treatment of coronavirus does not require an antibiotic. Discussed use of antiviral, patient declines. Wash hands often with soap and water for at least 20 seconds or alternatively use hand  with at least 60% alcohol content    Cover coughs and sneezes    Wear a mask when around others if possible    Clean all high-touch surfaces every day, such as doorknobs and cellphones    Continually monitor symptoms. If you develop chest pain or shortness of breath, please report to an urgent care with xray or the ER. Be sure to move around and take deep breaths. Per CDC guidelines:    *Isolate for at least 5 days. To calculate your 5-day isolation period, day 0 is your first day of symptoms. Day 1 is the first full day after your symptoms developed. You can leave isolation after 5 full days. *You can end isolation after 5 full days if you are fever-free for 24 hours without the use of fever-reducing medication and your other symptoms have improved (Loss of taste and smell may persist for weeks or months after recovery and need not delay the end of isolation).   *You should continue to wear a well-fitting mask around others at home and in public for 5 additional days (day 6 through day 10) after the end of your 5-day isolation period. If you are unable to wear a mask when around others, you should continue to isolate for a full 10 days. Avoid people who are immunocompromised or at high risk for severe disease, and nursing homes and other high-risk settings, until after at least 10 days. Subjective:     +covid home test Monday   Started with cough and sore throat on Monday. Had a low grade fever Monday but none since. Has some sore throat and mild intermittent coughing still, tylenol for the sore throat. Eating and drinking well. Denies SOB or wheezing. She notes that her sister and brother both had covid. Says that her brother is handicap and her sister and herself take care of him. Wants to know if she can resume being around him. He had covid last week. Prior to Admission medications    Medication Sig Start Date End Date Taking? Authorizing Provider   simvastatin (ZOCOR) 20 mg tablet Take 1 Tablet by mouth nightly. 7/15/22  Yes Rose Hernandez NP   SITagliptin (JANUVIA) 25 mg tablet Take 1 Tablet by mouth daily. 6/29/22  Yes Rose Hernandez NP   glucose blood VI test strips (OneTouch Ultra Test) strip USE AS DIRECTED TO TEST BLOOD SUGAR DAILY. Freestyle Lite 6/29/22  Yes Rose Hernandez NP   metFORMIN ER (GLUCOPHAGE XR) 500 mg tablet TAKE 2 TABLETS BY MOUTH TWICE DAILY 6/13/22  Yes Rose Hernandez NP   nystatin (MYCOSTATIN) topical cream APPLY TOPICALLY TO THE AFFECTED AREA TWICE DAILY 5/16/22  Yes Rose Hernandez NP   lisinopriL (PRINIVIL, ZESTRIL) 20 mg tablet Take 1 Tablet by mouth daily. 3/15/22  Yes Rose Hernandez NP   metoprolol succinate (Toprol XL) 50 mg XL tablet TAKE 1 TABLET DAILY 2/15/22  Yes Rose Hernandez NP   meloxicam (MOBIC) 15 mg tablet 15 mg. 6/20/21  Yes Provider, Historical   pantoprazole (PROTONIX) 20 mg tablet Take 20 mg by mouth daily.    Yes Provider, Historical   Blood-Glucose Meter monitoring kit Check blood sugar daily. Dx code E11.9 10/3/19  Yes Rose Hernandez NP   lancets misc Check blood sugar daily. Dx code E11.9 10/3/19  Yes Rose Hernandez NP   DULoxetine (CYMBALTA) 30 mg capsule Take 1 cap by mouth every evening. Patient taking differently: Take 30 mg by mouth every evening. Take 1 cap by mouth every evening. 6/25/18  Yes Peyton Mayberry MD   DULoxetine (CYMBALTA) 60 mg capsule Take 1 cap by mouth every evening. Patient taking differently: Take 60 mg by mouth in the morning. Indications: chronic muscle or bone pain 6/25/18  Yes Peyton Mayberry MD   gabapentin (NEURONTIN) 300 mg capsule TAKE 1 CAPSULE BY MOUTH 5 TIMES DAILY  Patient taking differently: Indications: pt reports taking 3 tab three times daily 5/8/18   Peyton Mayberry MD         ROS  Gen: mild  fatigue, -fever, -chills  Eyes: no excessive tearing, itching, or discharge  Nose: + rhinorrhea, no sinus pain  Mouth: no oral lesions, + sore throat  Resp: no shortness of breath, no wheezing, + cough  CV: no chest pain, no paroxysmal nocturnal dyspnea  Abd: no nausea, no heartburn, no diarrhea, no constipation, no abdominal pain  Neuro: no headaches, no syncope or presyncopal episodes  Endo: no polyuria, no polydipsia  Heme: no lymphadenopathy, no easy bruising or bleeding        No data recorded     Brenden Garibay was evaluated through a patient-initiated, synchronous (real-time) audio only encounter. She (or guardian if applicable) is aware that it is a billable service, which includes applicable co-pays, with coverage as determined by her insurance carrier. This visit was conducted with the patient's (and/or Omardayday Mando guardian's) verbal consent. She has not had a related appointment within my department in the past 7 days or scheduled within the next 24 hours. The patient was located in a state where the provider was licensed to provide care.   The patient was located at: Home: [place] [address]  The provider was located at:  Facility (Appt Department): [facility] [address]    Total Time: minutes: 11-20 minutes    Neyda Hurst NP

## 2022-08-06 LAB — HEMOCCULT STL QL IA: NEGATIVE

## 2022-09-01 DIAGNOSIS — E11.65 TYPE 2 DIABETES MELLITUS WITH HYPERGLYCEMIA, WITHOUT LONG-TERM CURRENT USE OF INSULIN (HCC): ICD-10-CM

## 2022-09-08 ENCOUNTER — TELEPHONE (OUTPATIENT)
Dept: FAMILY MEDICINE CLINIC | Age: 77
End: 2022-09-08

## 2022-09-15 DIAGNOSIS — E11.65 TYPE 2 DIABETES MELLITUS WITH HYPERGLYCEMIA, WITHOUT LONG-TERM CURRENT USE OF INSULIN (HCC): ICD-10-CM

## 2022-09-28 ENCOUNTER — TELEPHONE (OUTPATIENT)
Dept: FAMILY MEDICINE CLINIC | Age: 77
End: 2022-09-28

## 2022-09-28 ENCOUNTER — TRANSCRIBE ORDER (OUTPATIENT)
Dept: SCHEDULING | Age: 77
End: 2022-09-28

## 2022-09-28 DIAGNOSIS — Z12.31 VISIT FOR SCREENING MAMMOGRAM: Primary | ICD-10-CM

## 2022-09-28 NOTE — TELEPHONE ENCOUNTER
[address]  [phone]  fax #  [phone]        -CMN and chart notes/ office notes   -recent office back dated to possibly 6 months if possible   Please call Irish Blodo back with any questions or concerns       Thank You

## 2022-09-29 ENCOUNTER — TELEPHONE (OUTPATIENT)
Dept: FAMILY MEDICINE CLINIC | Age: 77
End: 2022-09-29

## 2022-09-29 NOTE — TELEPHONE ENCOUNTER
----- Message from Leyla Burgess sent at 9/29/2022  4:42 PM EDT -----  Subject: Message to Provider    QUESTIONS  Information for Provider? Rep by the name Bond Screen is following up about fax   needed from clinical staff pertaining to a copy of the pt Dexacom   prescription history. Please contact as soon as possible with that   information for this medical equipment department. Fax number? [phone]  ---------------------------------------------------------------------------  --------------  Raya MOORE  [phone]; OK to leave message on voicemail  ---------------------------------------------------------------------------  --------------  SCRIPT ANSWERS  Relationship to Patient? Third Party  Third Party Type? Durable Medical Equipment? Representative Name? Bond Screen?  [address] for [employer]

## 2022-09-30 NOTE — TELEPHONE ENCOUNTER
I signed the form yesterday.   She is not on insulin and only checking blood sugar once per day so likely will not qualify

## 2022-10-04 ENCOUNTER — TELEPHONE (OUTPATIENT)
Dept: FAMILY MEDICINE CLINIC | Age: 77
End: 2022-10-04

## 2022-10-04 NOTE — TELEPHONE ENCOUNTER
----- Message from Yvonne Massey sent at 10/3/2022  2:03 PM EDT -----  Subject: Message to Provider    QUESTIONS  Information for Provider? Larry Jimenez from [facility] is calling regarding   the order for the [employer]. The chart notes have been received, but he   still needs the CMN or prescription so that the pt can get her supplies   shipped to her. Pls get that faxed to [phone]  ---------------------------------------------------------------------------  --------------  CALL BACK INFO  [phone]; OK to leave message on voicemail  ---------------------------------------------------------------------------  --------------  SCRIPT ANSWERS  Relationship to Patient? Third Party  Third Party Type? Durable Medical Equipment? Representative Name?  Larry Jimenez

## 2022-11-08 DIAGNOSIS — I10 ESSENTIAL HYPERTENSION: ICD-10-CM

## 2022-11-08 RX ORDER — LISINOPRIL 20 MG/1
20 TABLET ORAL DAILY
Qty: 90 TABLET | Refills: 1 | Status: SHIPPED | OUTPATIENT
Start: 2022-11-08

## 2022-11-08 NOTE — TELEPHONE ENCOUNTER
Refill Request (rachel sent fax)     Requested Prescriptions     Pending Prescriptions Disp Refills    lisinopriL (PRINIVIL, ZESTRIL) 20 mg tablet 90 Tablet 1     Sig: Take 1 Tablet by mouth daily.        Thank You

## 2022-12-09 DIAGNOSIS — E11.9 CONTROLLED TYPE 2 DIABETES MELLITUS WITHOUT COMPLICATION, WITHOUT LONG-TERM CURRENT USE OF INSULIN (HCC): ICD-10-CM

## 2022-12-09 RX ORDER — METFORMIN HYDROCHLORIDE 500 MG/1
TABLET, EXTENDED RELEASE ORAL
Qty: 360 TABLET | Refills: 1 | Status: SHIPPED | OUTPATIENT
Start: 2022-12-09

## 2022-12-13 ENCOUNTER — HOSPITAL ENCOUNTER (OUTPATIENT)
Dept: MAMMOGRAPHY | Age: 77
Discharge: HOME | End: 2022-12-13
Attending: NURSE PRACTITIONER
Payer: MEDICARE

## 2022-12-13 DIAGNOSIS — Z12.31 VISIT FOR SCREENING MAMMOGRAM: ICD-10-CM

## 2022-12-13 PROCEDURE — 77063 BREAST TOMOSYNTHESIS BI: CPT

## 2022-12-14 ENCOUNTER — OFFICE VISIT (OUTPATIENT)
Dept: FAMILY MEDICINE CLINIC | Age: 77
End: 2022-12-14
Payer: MEDICARE

## 2022-12-14 VITALS
BODY MASS INDEX: 35.77 KG/M2 | DIASTOLIC BLOOD PRESSURE: 81 MMHG | OXYGEN SATURATION: 98 % | RESPIRATION RATE: 16 BRPM | SYSTOLIC BLOOD PRESSURE: 139 MMHG | TEMPERATURE: 98.4 F | HEIGHT: 62 IN | HEART RATE: 61 BPM | WEIGHT: 194.4 LBS

## 2022-12-14 DIAGNOSIS — I10 ESSENTIAL HYPERTENSION: ICD-10-CM

## 2022-12-14 DIAGNOSIS — M19.90 ARTHRITIS: ICD-10-CM

## 2022-12-14 DIAGNOSIS — E11.9 CONTROLLED TYPE 2 DIABETES MELLITUS WITHOUT COMPLICATION, WITHOUT LONG-TERM CURRENT USE OF INSULIN (HCC): Primary | ICD-10-CM

## 2022-12-14 DIAGNOSIS — H00.015 HORDEOLUM EXTERNUM OF LEFT LOWER EYELID: ICD-10-CM

## 2022-12-14 DIAGNOSIS — E78.5 HYPERLIPIDEMIA, UNSPECIFIED HYPERLIPIDEMIA TYPE: ICD-10-CM

## 2022-12-14 DIAGNOSIS — E11.65 TYPE 2 DIABETES MELLITUS WITH HYPERGLYCEMIA, WITHOUT LONG-TERM CURRENT USE OF INSULIN (HCC): ICD-10-CM

## 2022-12-14 LAB — HBA1C MFR BLD HPLC: 7.3 %

## 2022-12-14 PROCEDURE — 1123F ACP DISCUSS/DSCN MKR DOCD: CPT | Performed by: NURSE PRACTITIONER

## 2022-12-14 PROCEDURE — G8399 PT W/DXA RESULTS DOCUMENT: HCPCS | Performed by: NURSE PRACTITIONER

## 2022-12-14 PROCEDURE — 3078F DIAST BP <80 MM HG: CPT | Performed by: NURSE PRACTITIONER

## 2022-12-14 PROCEDURE — 3074F SYST BP LT 130 MM HG: CPT | Performed by: NURSE PRACTITIONER

## 2022-12-14 PROCEDURE — G8417 CALC BMI ABV UP PARAM F/U: HCPCS | Performed by: NURSE PRACTITIONER

## 2022-12-14 PROCEDURE — 3017F COLORECTAL CA SCREEN DOC REV: CPT | Performed by: NURSE PRACTITIONER

## 2022-12-14 PROCEDURE — G8427 DOCREV CUR MEDS BY ELIG CLIN: HCPCS | Performed by: NURSE PRACTITIONER

## 2022-12-14 PROCEDURE — G8536 NO DOC ELDER MAL SCRN: HCPCS | Performed by: NURSE PRACTITIONER

## 2022-12-14 PROCEDURE — G8752 SYS BP LESS 140: HCPCS | Performed by: NURSE PRACTITIONER

## 2022-12-14 PROCEDURE — 83036 HEMOGLOBIN GLYCOSYLATED A1C: CPT | Performed by: NURSE PRACTITIONER

## 2022-12-14 PROCEDURE — G8754 DIAS BP LESS 90: HCPCS | Performed by: NURSE PRACTITIONER

## 2022-12-14 PROCEDURE — G0463 HOSPITAL OUTPT CLINIC VISIT: HCPCS | Performed by: NURSE PRACTITIONER

## 2022-12-14 PROCEDURE — 1101F PT FALLS ASSESS-DOCD LE1/YR: CPT | Performed by: NURSE PRACTITIONER

## 2022-12-14 PROCEDURE — 2022F DILAT RTA XM EVC RTNOPTHY: CPT | Performed by: NURSE PRACTITIONER

## 2022-12-14 PROCEDURE — 1090F PRES/ABSN URINE INCON ASSESS: CPT | Performed by: NURSE PRACTITIONER

## 2022-12-14 PROCEDURE — 99214 OFFICE O/P EST MOD 30 MIN: CPT | Performed by: NURSE PRACTITIONER

## 2022-12-14 PROCEDURE — 3051F HG A1C>EQUAL 7.0%<8.0%: CPT | Performed by: NURSE PRACTITIONER

## 2022-12-14 PROCEDURE — G9717 DOC PT DX DEP/BP F/U NT REQ: HCPCS | Performed by: NURSE PRACTITIONER

## 2022-12-14 NOTE — PROGRESS NOTES
Chief Complaint   Patient presents with    Follow-up     Routine 6 month check     1. Have you been to the ER, urgent care clinic since your last visit? Hospitalized since your last visit? No    2. Have you seen or consulted any other health care providers outside of the [address] [place] [address] since your last visit? Include any pap smears or colon screening.  Mammogram yesterday    Health Maintenance Due   Topic Date Due    Eye Exam Retinal or Dilated  Never done    Shingrix Vaccine Age 50> (1 of 2) Never done    COVID-19 Vaccine (3 - Booster for Moderna series) 06/16/2021    Foot Exam Q1  06/23/2022    Flu Vaccine (1) 08/01/2022

## 2022-12-14 NOTE — PROGRESS NOTES
Subjective:     Chief Complaint   Patient presents with    Follow-up     Routine 6 month check        HPI:  78 y.o.  presents for follow up appointment. Last DM check hemoglobin A1c on 5/2/22 was 8.1  Blood sugar fasting usually 107-207, most commonly in 140s  Taking medication as directed. Admits that her diet is not as good as it should be. No new numbness or tingling. No polyuria or polydipsia   Started on Januvia in June. Said she is doing okay on that. Followed by rheumatology for back and shoulder arthritis. She had labs done on 11/3/22  Has back pain as well, chronic. Has had injections in the past.  She is followed by [facility]. She takes flexeril as needed for spasms. She is now going to [facility] and getting laser therapy on her back. Says she started last month and has helped with the pain some. Still walking a little off but has scoliosis and degeneration that is non surgical, so trying this alternative medicine. Very expensive, not covered insurance. Treatments complete end of this month. Will try PT again next month. Left lower lid of eye with \"stye\", started few days ago. Has been treating with OTC stye cream, does feel that it is getting better. No hospital, ER or specialist visits since last primary care visit except as noted above. Past Medical History:   Diagnosis Date    ADHD     Adverse effect of anesthesia     hard to put to sleep says patient    Anemia     Arthritis     bilateral shoulders, back.   Sees rheumatology in [place] (Dr. Champ Melendez)    Chronic pain     shoulders, back    Degenerative disc disease, lumbar     [place], Dr. Champ Melendez ([place] physician)    Depression     Diabetes [facility])     Pre diabetic: oral medication    Hypercholesterolemia     Hypertension     Memory difficulties     MRSA (methicillin resistant staph aureus) culture positive 06/04/2018    pos for staph, NOT pos for MRSA, pt was treated, neg cx on 08/2018, pt denies hx of MRSA    Sleep apnea     uses CPAP       Social History     Tobacco Use    Smoking status: Never    Smokeless tobacco: Never   Substance Use Topics    Alcohol use: No    Drug use: No       Outpatient Medications Marked as Taking for the 12/14/22 encounter (Office Visit) with Cassius Gracia NP   Medication Sig Dispense Refill    metFORMIN ER (GLUCOPHAGE XR) 500 mg tablet TAKE 2 TABLETS BY MOUTH TWICE DAILY 360 Tablet 1    lisinopriL (PRINIVIL, ZESTRIL) 20 mg tablet Take 1 Tablet by mouth daily. 90 Tablet 1    SITagliptin (JANUVIA) 25 mg tablet Take 1 Tablet by mouth daily. 90 Tablet 2    simvastatin (ZOCOR) 20 mg tablet Take 1 Tablet by mouth nightly. 90 Tablet 3    glucose blood VI test strips (OneTouch Ultra Test) strip USE AS DIRECTED TO TEST BLOOD SUGAR DAILY. Freestyle Lite 100 Strip 3    nystatin (MYCOSTATIN) topical cream APPLY TOPICALLY TO THE AFFECTED AREA TWICE DAILY 60 g 0    metoprolol succinate (Toprol XL) 50 mg XL tablet TAKE 1 TABLET DAILY 90 Tablet 4    meloxicam (MOBIC) 15 mg tablet 15 mg.      pantoprazole (PROTONIX) 20 mg tablet Take 20 mg by mouth daily. Blood-Glucose Meter monitoring kit Check blood sugar daily. Dx code E11.9 1 Kit 0    lancets misc Check blood sugar daily. Dx code E11.9 100 Each 11    DULoxetine (CYMBALTA) 30 mg capsule Take 1 cap by mouth every evening. (Patient taking differently: Take 30 mg by mouth every evening. Take 1 cap by mouth every evening.) 90 Cap 1    DULoxetine (CYMBALTA) 60 mg capsule Take 1 cap by mouth every evening. (Patient taking differently: Take 60 mg by mouth daily.  Indications: chronic muscle or bone pain) 90 Cap 1    gabapentin (NEURONTIN) 300 mg capsule TAKE 1 CAPSULE BY MOUTH 5 TIMES DAILY (Patient taking differently: Indications: pt reports taking 3 tab three times daily) 420 Cap 0       Allergies   Allergen Reactions    Other Food Rash     Citrullus Vulgaris (watermelon)    Sulfa (Sulfonamide Antibiotics) Hives    Zoloft [Sertraline] Hives Beef Containing Products Other (comments)     Break out in bumps      Dicloxacillin Hives and Itching    Pistachio Nut Unknown (comments)     Per pt was from allergy testing, she is not aware of a reaction      Watermelon Unknown (comments)     [facility]       Health Maintenance reviewed       ROS:  Gen: no fatigue, no fever, no chills, no unexplained weight loss or weight gain  Eyes: no excessive tearing, itching, or discharge  Nose: no rhinorrhea, no sinus pain  Mouth: no oral lesions, no sore throat, no difficulty swallowing  Resp: no shortness of breath, no wheezing, no cough  CV: no chest pain, no orthopnea, no paroxysmal nocturnal dyspnea, no lower extremity edema, no palpitations  Abd: no nausea, no heartburn, no diarrhea, no constipation, no abdominal pain  Neuro: no headaches, no syncope or presyncopal episodes  Endo: no polyuria, no polydipsia. : no hematuria, no dysuria, no frequency, no incontinence  Heme: no lymphadenopathy, no easy bruising or bleeding, no night sweats    PE:  Visit Vitals  /81 (BP 1 Location: Right upper arm, BP Patient Position: Sitting, BP Cuff Size: Adult long)   Pulse 61   Temp 98.4 °F (36.9 °C) (Temporal)   Resp 16   Ht 5' 2\" (1.575 m)   Wt 194 lb 6.4 oz (88.2 kg)   LMP  (LMP Unknown)   SpO2 98%   BMI 35.56 kg/m²     Gen: alert, oriented, no acute distress  Head: normocephalic, atraumatic  Ears: external auditory canals clear, TMs without erythema or effusion  Eyes: pupils equal round reactive to light, sclera clear, conjunctiva clear. LEFT lower eye lid healing stye  Oral: moist mucus membranes, no oral lesions, no pharyngeal inflammation or exudate  Neck: symmetric normal sized thyroid, no carotid bruits, no jugular vein distention  Resp: no increase work of breathing, lungs clear to ausculation bilaterally, no wheezing, rales or rhonchi  CV: S1, S2 normal.  No murmurs, rubs, or gallops. Abd: soft, not tender, not distended. No hepatosplenomegaly.  Normal bowel sounds. No hernias. No abdominal or renal bruits. Neuro: cranial nerves intact, normal strength and movement in all extremities, and sensation intact and symmetric. Skin: no lesion or rash  Extremities: no cyanosis or edema    Results for orders placed or performed in visit on 12/14/22   AMB POC HEMOGLOBIN A1C   Result Value Ref Range    Hemoglobin A1c (POC) 7.3 %       Assessment/Plan:  Differential diagnosis and treatment options reviewed with patient who is in agreement with treatment plan as outlined below. ICD-10-CM ICD-9-CM    1. Controlled type 2 diabetes mellitus without complication, without long-term current use of insulin (HCC)  E11.9 250.00 AMB POC HEMOGLOBIN A1C      2. Type 2 diabetes mellitus with hyperglycemia, without long-term current use of insulin (HCC)  E11.65 250.00      790.29       3. Hyperlipidemia, unspecified hyperlipidemia type  E78.5 272.4       4. Arthritis  M19.90 716.90       5. Essential hypertension  I10 401.9       6. Hordeolum externum of left lower eyelid  H00.015 373.11           Routine labs reviewed, copy made to put on her chart  BP at goal. DASH diet encouraged. Will continue to follow with ortho and rheumatology as planned  DM-much better. Needs to continue to watch her diet. Continue to treat stye with warm compresses and stye ointment. Discussed BMI and healthy weight. Encouraged patient to work to implement changes including diet high in raw fruits and vegetables, lean protein and good fats. Limit refined, processed carbohydrates and sugar. Encouraged regular exercise. Recommended regular cardiovascular exercise 3-6 times per week for 30-60 minutes daily. I have discussed the diagnosis with the patient and the intended plan as seen in the above orders. The patient has received an after-visit summary and questions were answered concerning future plans. I have discussed medication side effects and warnings with the patient as well.   The patient verbalizes understanding and agreement with the plan.

## 2023-02-01 RX ORDER — METOPROLOL SUCCINATE 50 MG/1
TABLET, EXTENDED RELEASE ORAL
Qty: 90 TABLET | Refills: 4 | Status: SHIPPED | OUTPATIENT
Start: 2023-02-01

## 2023-02-01 NOTE — TELEPHONE ENCOUNTER
[facility] is requesting a 90 day supply on med    Requested Prescriptions     Pending Prescriptions Disp Refills    metoprolol succinate (Toprol XL) 50 mg XL tablet 90 Tablet 4     Sig: TAKE 1 TABLET DAILY

## 2023-05-23 RX ORDER — BLOOD SUGAR DIAGNOSTIC
STRIP MISCELLANEOUS
Qty: 100 STRIP | Refills: 3 | Status: SHIPPED | OUTPATIENT
Start: 2023-05-23

## 2023-05-30 RX ORDER — LISINOPRIL 20 MG/1
TABLET ORAL
Qty: 90 TABLET | Refills: 3 | Status: SHIPPED | OUTPATIENT
Start: 2023-05-30

## 2023-06-12 RX ORDER — METFORMIN HYDROCHLORIDE 500 MG/1
TABLET, EXTENDED RELEASE ORAL
Qty: 360 TABLET | Refills: 3 | Status: SHIPPED | OUTPATIENT
Start: 2023-06-12 | End: 2023-06-15 | Stop reason: SDUPTHER

## 2023-07-03 RX ORDER — SIMVASTATIN 20 MG
TABLET ORAL
Qty: 90 TABLET | Refills: 1 | Status: SHIPPED | OUTPATIENT
Start: 2023-07-03

## 2023-07-19 ENCOUNTER — OFFICE VISIT (OUTPATIENT)
Age: 78
End: 2023-07-19
Payer: MEDICARE

## 2023-07-19 VITALS
WEIGHT: 192.2 LBS | TEMPERATURE: 98.3 F | DIASTOLIC BLOOD PRESSURE: 71 MMHG | BODY MASS INDEX: 35.15 KG/M2 | RESPIRATION RATE: 16 BRPM | SYSTOLIC BLOOD PRESSURE: 135 MMHG | OXYGEN SATURATION: 97 % | HEART RATE: 69 BPM

## 2023-07-19 DIAGNOSIS — Z00.00 MEDICARE ANNUAL WELLNESS VISIT, SUBSEQUENT: Primary | ICD-10-CM

## 2023-07-19 DIAGNOSIS — Z13.29 SCREENING FOR THYROID DISORDER: ICD-10-CM

## 2023-07-19 DIAGNOSIS — E11.65 TYPE 2 DIABETES MELLITUS WITH HYPERGLYCEMIA, WITHOUT LONG-TERM CURRENT USE OF INSULIN (HCC): ICD-10-CM

## 2023-07-19 DIAGNOSIS — E53.8 B12 DEFICIENCY: ICD-10-CM

## 2023-07-19 DIAGNOSIS — I10 ESSENTIAL (PRIMARY) HYPERTENSION: ICD-10-CM

## 2023-07-19 DIAGNOSIS — E78.2 MIXED HYPERLIPIDEMIA: ICD-10-CM

## 2023-07-19 DIAGNOSIS — E55.9 VITAMIN D DEFICIENCY: ICD-10-CM

## 2023-07-19 DIAGNOSIS — M54.41 CHRONIC BILATERAL LOW BACK PAIN WITH BILATERAL SCIATICA: ICD-10-CM

## 2023-07-19 DIAGNOSIS — M54.42 CHRONIC BILATERAL LOW BACK PAIN WITH BILATERAL SCIATICA: ICD-10-CM

## 2023-07-19 DIAGNOSIS — G89.29 CHRONIC BILATERAL LOW BACK PAIN WITH BILATERAL SCIATICA: ICD-10-CM

## 2023-07-19 PROCEDURE — G0439 PPPS, SUBSEQ VISIT: HCPCS | Performed by: NURSE PRACTITIONER

## 2023-07-19 PROCEDURE — G8417 CALC BMI ABV UP PARAM F/U: HCPCS | Performed by: NURSE PRACTITIONER

## 2023-07-19 PROCEDURE — 2022F DILAT RTA XM EVC RTNOPTHY: CPT | Performed by: NURSE PRACTITIONER

## 2023-07-19 PROCEDURE — 99214 OFFICE O/P EST MOD 30 MIN: CPT | Performed by: NURSE PRACTITIONER

## 2023-07-19 PROCEDURE — 3046F HEMOGLOBIN A1C LEVEL >9.0%: CPT | Performed by: NURSE PRACTITIONER

## 2023-07-19 PROCEDURE — 1090F PRES/ABSN URINE INCON ASSESS: CPT | Performed by: NURSE PRACTITIONER

## 2023-07-19 PROCEDURE — 1036F TOBACCO NON-USER: CPT | Performed by: NURSE PRACTITIONER

## 2023-07-19 PROCEDURE — 3078F DIAST BP <80 MM HG: CPT | Performed by: NURSE PRACTITIONER

## 2023-07-19 PROCEDURE — G8427 DOCREV CUR MEDS BY ELIG CLIN: HCPCS | Performed by: NURSE PRACTITIONER

## 2023-07-19 PROCEDURE — G8400 PT W/DXA NO RESULTS DOC: HCPCS | Performed by: NURSE PRACTITIONER

## 2023-07-19 PROCEDURE — 3017F COLORECTAL CA SCREEN DOC REV: CPT | Performed by: NURSE PRACTITIONER

## 2023-07-19 PROCEDURE — 3075F SYST BP GE 130 - 139MM HG: CPT | Performed by: NURSE PRACTITIONER

## 2023-07-19 PROCEDURE — 1123F ACP DISCUSS/DSCN MKR DOCD: CPT | Performed by: NURSE PRACTITIONER

## 2023-07-19 RX ORDER — NYSTATIN 100000 U/G
CREAM TOPICAL
Qty: 30 G | Refills: 3 | Status: SHIPPED | OUTPATIENT
Start: 2023-07-19

## 2023-07-19 ASSESSMENT — LIFESTYLE VARIABLES
HOW MANY STANDARD DRINKS CONTAINING ALCOHOL DO YOU HAVE ON A TYPICAL DAY: PATIENT DOES NOT DRINK
HOW OFTEN DO YOU HAVE A DRINK CONTAINING ALCOHOL: NEVER

## 2023-07-19 ASSESSMENT — PATIENT HEALTH QUESTIONNAIRE - PHQ9
2. FEELING DOWN, DEPRESSED OR HOPELESS: 0
SUM OF ALL RESPONSES TO PHQ9 QUESTIONS 1 & 2: 0
SUM OF ALL RESPONSES TO PHQ QUESTIONS 1-9: 0
SUM OF ALL RESPONSES TO PHQ QUESTIONS 1-9: 0
1. LITTLE INTEREST OR PLEASURE IN DOING THINGS: 0
SUM OF ALL RESPONSES TO PHQ QUESTIONS 1-9: 0
SUM OF ALL RESPONSES TO PHQ QUESTIONS 1-9: 0

## 2023-07-19 NOTE — PROGRESS NOTES
Medicare Annual Wellness Visit    Olesya Sahu is here for 6 Month Follow-Up (Pt fasting today/) and Medicare AWV    Assessment & Plan   Medicare annual wellness visit, subsequent  -     Vitamin B12; Future  Type 2 diabetes mellitus with hyperglycemia, without long-term current use of insulin (HCC)-continue current therapy. Labs today. -     Comprehensive Metabolic Panel; Future  -     CBC with Auto Differential; Future  -     Hemoglobin A1C; Future  Essential (primary) hypertension-BP at goal.  Mixed hyperlipidemia-labs today   -     Lipid Panel; Future  -     Comprehensive Metabolic Panel; Future  Vitamin D deficiency-taking supplement  -     Vitamin D 25 Hydroxy; Future  Screening for thyroid disorder  -     TSH; Future  B12 deficiency-labs today   Chronic bilateral low back pain with bilateral sciatica-continue PT and care with specialist      Discussed BMI and healthy weight. Encouraged patient to work to implement changes including diet high in raw fruits and vegetables, lean protein and good fats. Limit refined, processed carbohydrates and sugar. Encouraged regular exercise. Recommendations for Preventive Services Due: see orders and patient instructions/AVS.  Recommended screening schedule for the next 5-10 years is provided to the patient in written form: see Patient Instructions/AVS.     Return in about 6 months (around 1/19/2024), or if symptoms worsen or fail to improve. Subjective       DM  Last HgbA1c was 7.3 on 12/14/2022  Blood sugars at home: 130-140  No wounds to feet. No hypoglycemia episodes   Trying to watch diet most of the time and drinking more water. Hypertension:  Home blood pressure monitoring: No.  She is adherent to a low sodium diet. Patient denies chest pain, shortness of breath, and palpitations. Antihypertensive medication side effects: no medication side effects noted.       Sodium (mmol/L)   Date Value   09/16/2020 140    BUN (mg/dL)   Date Value   09/16/2020 15

## 2023-07-19 NOTE — PROGRESS NOTES
Chief Complaint   Patient presents with    [address] Maintenance Due   Topic Date Due    Shingles vaccine (2 of 3) 05/15/2016    COVID-19 Vaccine (4 - Booster for Fabian Aryan series) 01/14/2023    Lipids  05/02/2023    Annual Wellness Visit (AWV)  06/30/2023           1. \"Have you been to the ER, urgent care clinic since your last visit? Hospitalized since your last visit? \" No    2. \"Have you seen or consulted any other health care providers outside of the [address] [place] [address] since your last visit? \" No     3. For patients aged 43-73: Has the patient had a colonoscopy / FIT/ Cologuard? NA - based on age      If the patient is female:    4. For patients aged 43-66: Has the patient had a mammogram within the past 2 years? Yes - Care Gap present. Most recent result on file      5. For patients aged 21-65: Has the patient had a pap smear?  NA - based on age or sex

## 2023-07-20 LAB
25(OH)D3 SERPL-MCNC: 49.9 NG/ML (ref 30–100)
ALBUMIN SERPL-MCNC: 4.1 G/DL (ref 3.5–5)
ALBUMIN/GLOB SERPL: 1.5 (ref 1.1–2.2)
ALP SERPL-CCNC: 58 U/L (ref 45–117)
ALT SERPL-CCNC: 37 U/L (ref 12–78)
ANION GAP SERPL CALC-SCNC: 7 MMOL/L (ref 5–15)
AST SERPL-CCNC: 20 U/L (ref 15–37)
BASOPHILS # BLD: 0 K/UL (ref 0–0.1)
BASOPHILS NFR BLD: 0 % (ref 0–1)
BILIRUB SERPL-MCNC: 0.4 MG/DL (ref 0.2–1)
BUN SERPL-MCNC: 17 MG/DL (ref 6–20)
BUN/CREAT SERPL: 29 (ref 12–20)
CALCIUM SERPL-MCNC: 9.4 MG/DL (ref 8.5–10.1)
CHLORIDE SERPL-SCNC: 100 MMOL/L (ref 97–108)
CHOLEST SERPL-MCNC: 134 MG/DL
CO2 SERPL-SCNC: 30 MMOL/L (ref 21–32)
CREAT SERPL-MCNC: 0.59 MG/DL (ref 0.55–1.02)
DIFFERENTIAL METHOD BLD: NORMAL
EOSINOPHIL # BLD: 0.1 K/UL (ref 0–0.4)
EOSINOPHIL NFR BLD: 1 % (ref 0–7)
ERYTHROCYTE [DISTWIDTH] IN BLOOD BY AUTOMATED COUNT: 13.2 % (ref 11.5–14.5)
EST. AVERAGE GLUCOSE BLD GHB EST-MCNC: 148 MG/DL
GLOBULIN SER CALC-MCNC: 2.8 G/DL (ref 2–4)
GLUCOSE SERPL-MCNC: 129 MG/DL (ref 65–100)
HBA1C MFR BLD: 6.8 % (ref 4–5.6)
HCT VFR BLD AUTO: 42.6 % (ref 35–47)
HDLC SERPL-MCNC: 47 MG/DL
HDLC SERPL: 2.9 (ref 0–5)
HGB BLD-MCNC: 13 G/DL (ref 11.5–16)
IMM GRANULOCYTES # BLD AUTO: 0 K/UL (ref 0–0.04)
IMM GRANULOCYTES NFR BLD AUTO: 0 % (ref 0–0.5)
LDLC SERPL CALC-MCNC: 61.6 MG/DL (ref 0–100)
LYMPHOCYTES # BLD: 2 K/UL (ref 0.8–3.5)
LYMPHOCYTES NFR BLD: 32 % (ref 12–49)
MCH RBC QN AUTO: 28.1 PG (ref 26–34)
MCHC RBC AUTO-ENTMCNC: 30.5 G/DL (ref 30–36.5)
MCV RBC AUTO: 92 FL (ref 80–99)
MONOCYTES # BLD: 0.6 K/UL (ref 0–1)
MONOCYTES NFR BLD: 9 % (ref 5–13)
NEUTS SEG # BLD: 3.6 K/UL (ref 1.8–8)
NEUTS SEG NFR BLD: 58 % (ref 32–75)
NRBC # BLD: 0 K/UL (ref 0–0.01)
NRBC BLD-RTO: 0 PER 100 WBC
PLATELET # BLD AUTO: 270 K/UL (ref 150–400)
PMV BLD AUTO: 11.1 FL (ref 8.9–12.9)
POTASSIUM SERPL-SCNC: 4.4 MMOL/L (ref 3.5–5.1)
PROT SERPL-MCNC: 6.9 G/DL (ref 6.4–8.2)
RBC # BLD AUTO: 4.63 M/UL (ref 3.8–5.2)
SODIUM SERPL-SCNC: 137 MMOL/L (ref 136–145)
TRIGL SERPL-MCNC: 127 MG/DL
TSH SERPL DL<=0.05 MIU/L-ACNC: 1.39 UIU/ML (ref 0.36–3.74)
VIT B12 SERPL-MCNC: 340 PG/ML (ref 193–986)
VLDLC SERPL CALC-MCNC: 25.4 MG/DL
WBC # BLD AUTO: 6.4 K/UL (ref 3.6–11)

## 2024-01-02 RX ORDER — SIMVASTATIN 20 MG
TABLET ORAL
Qty: 90 TABLET | Refills: 3 | Status: SHIPPED | OUTPATIENT
Start: 2024-01-02

## 2024-01-05 ENCOUNTER — TRANSCRIBE ORDERS (OUTPATIENT)
Facility: HOSPITAL | Age: 79
End: 2024-01-05

## 2024-01-05 ENCOUNTER — TELEPHONE (OUTPATIENT)
Age: 79
End: 2024-01-05

## 2024-01-05 DIAGNOSIS — Z12.31 VISIT FOR SCREENING MAMMOGRAM: Primary | ICD-10-CM

## 2024-01-10 ENCOUNTER — HOSPITAL ENCOUNTER (OUTPATIENT)
Facility: HOSPITAL | Age: 79
Discharge: HOME | End: 2024-01-13
Payer: MEDICARE

## 2024-01-10 VITALS — BODY MASS INDEX: 34.06 KG/M2 | HEIGHT: 63 IN | WEIGHT: 192.24 LBS

## 2024-01-10 DIAGNOSIS — Z12.31 VISIT FOR SCREENING MAMMOGRAM: ICD-10-CM

## 2024-01-10 PROCEDURE — 77063 BREAST TOMOSYNTHESIS BI: CPT

## 2024-03-01 RX ORDER — METOPROLOL SUCCINATE 50 MG/1
50 TABLET, EXTENDED RELEASE ORAL DAILY
Qty: 90 TABLET | Refills: 2 | Status: SHIPPED | OUTPATIENT
Start: 2024-03-01

## 2024-03-01 NOTE — TELEPHONE ENCOUNTER
Refill request (pt calling)    Pt is out    metoprolol succinate (TOPROL XL) 50 MG extended release tablet     [facility] [place]

## 2024-04-23 DIAGNOSIS — G89.29 CHRONIC BILATERAL LOW BACK PAIN WITH BILATERAL SCIATICA: Primary | ICD-10-CM

## 2024-04-23 DIAGNOSIS — M54.42 CHRONIC BILATERAL LOW BACK PAIN WITH BILATERAL SCIATICA: Primary | ICD-10-CM

## 2024-04-23 DIAGNOSIS — M54.41 CHRONIC BILATERAL LOW BACK PAIN WITH BILATERAL SCIATICA: Primary | ICD-10-CM

## 2024-04-23 RX ORDER — GABAPENTIN 300 MG/1
CAPSULE ORAL
Qty: 360 CAPSULE | Refills: 0 | Status: SHIPPED | OUTPATIENT
Start: 2024-04-23 | End: 2024-07-23

## 2024-04-23 NOTE — TELEPHONE ENCOUNTER
Patient is calling in for medication refill. Patient is completely out.           gabapentin (NEURONTIN) 300 MG capsule

## (undated) DEVICE — NDL FLTR TIP 5 MIC 18GX1.5IN --

## (undated) DEVICE — SKIN MARKER,REGULAR TIP WITH RULER AND LABELS: Brand: DEVON

## (undated) DEVICE — NEEDLE HYPO 25GA L1.5IN BVL ORIENTED ECLIPSE

## (undated) DEVICE — TOWEL SURG W17XL27IN STD BLU COT NONFENESTRATED PREWASHED

## (undated) DEVICE — SET EXTN PRIMING 0.59ML 8.5IN 1.55LB PRSS RATE MINIBOR PUR

## (undated) DEVICE — MARKER,SKIN,WI/RULER AND LABELS: Brand: MEDLINE

## (undated) DEVICE — NEEDLE SPNL L4.75IN OD25GA QNCKE TYP SPINOCAN

## (undated) DEVICE — SYR 10ML LUER LOK 1/5ML GRAD --

## (undated) DEVICE — POLYLINED TOWEL: Brand: CONVERTORS

## (undated) DEVICE — (D)PREP SKN CHLRAPRP APPL 26ML -- CONVERT TO ITEM 371833

## (undated) DEVICE — NEEDLE SPNL 22GA L7IN BLK HUB S STL W/ QNCKE PNT W/OUT

## (undated) DEVICE — NDL SPNE QNCKE 22GX5IN LF BLK --

## (undated) DEVICE — ADULT SPO2 SENSOR: Brand: NELLCOR

## (undated) DEVICE — PREP SKN CHLRAPRP APL 26ML STR --

## (undated) DEVICE — SET IV EXTN L7IN 05ML PRIMING STD BOR MAXZERO CONN PINCH

## (undated) DEVICE — NEEDLE HYPO 18GA L1.5IN PNK S STL HUB POLYPR SHLD REG BVL

## (undated) DEVICE — SYR 5ML 1/5 GRAD LL NSAF LF --

## (undated) DEVICE — NDL SPNE QNCKE 25GX3.5IN LF --

## (undated) DEVICE — STERILE POLYISOPRENE POWDER-FREE SURGICAL GLOVES: Brand: PROTEXIS

## (undated) DEVICE — RF CANNULA, CVD: Brand: VENOM